# Patient Record
Sex: MALE | Race: WHITE | HISPANIC OR LATINO | Employment: OTHER | URBAN - METROPOLITAN AREA
[De-identification: names, ages, dates, MRNs, and addresses within clinical notes are randomized per-mention and may not be internally consistent; named-entity substitution may affect disease eponyms.]

---

## 2017-08-23 ENCOUNTER — HOSPITAL ENCOUNTER (OUTPATIENT)
Facility: HOSPITAL | Age: 47
Discharge: HOME | End: 2017-08-24
Attending: INTERNAL MEDICINE | Admitting: INTERNAL MEDICINE
Payer: MEDICARE

## 2017-08-23 DIAGNOSIS — R10.32 LLQ ABDOMINAL PAIN: ICD-10-CM

## 2017-08-23 DIAGNOSIS — I10 ESSENTIAL HYPERTENSION: ICD-10-CM

## 2017-08-23 DIAGNOSIS — G47.30 SLEEP APNEA, UNSPECIFIED TYPE: ICD-10-CM

## 2017-08-23 DIAGNOSIS — A09 DIARRHEA OF INFECTIOUS ORIGIN: Primary | ICD-10-CM

## 2017-08-23 LAB
ALBUMIN SERPL BCP-MCNC: 3.5 G/DL
ALP SERPL-CCNC: 148 U/L
ALT SERPL W/O P-5'-P-CCNC: 10 U/L
AMYLASE SERPL-CCNC: 51 U/L
ANION GAP SERPL CALC-SCNC: 11 MMOL/L
APTT BLDCRRT: 29.2 SEC
AST SERPL-CCNC: 13 U/L
BASOPHILS NFR BLD: 0 %
BILIRUB SERPL-MCNC: 0.4 MG/DL
BILIRUB UR QL STRIP: NEGATIVE
BUN SERPL-MCNC: 10 MG/DL
CALCIUM SERPL-MCNC: 9.5 MG/DL
CHLORIDE SERPL-SCNC: 106 MMOL/L
CLARITY UR: CLEAR
CO2 SERPL-SCNC: 23 MMOL/L
COLOR UR: YELLOW
CREAT SERPL-MCNC: 1 MG/DL
DIFFERENTIAL METHOD: ABNORMAL
EOSINOPHIL NFR BLD: 35 %
ERYTHROCYTE [DISTWIDTH] IN BLOOD BY AUTOMATED COUNT: 13.5 %
EST. GFR  (AFRICAN AMERICAN): >60 ML/MIN/1.73 M^2
EST. GFR  (NON AFRICAN AMERICAN): >60 ML/MIN/1.73 M^2
GLUCOSE SERPL-MCNC: 105 MG/DL
GLUCOSE UR QL STRIP: NEGATIVE
HCT VFR BLD AUTO: 40.5 %
HGB BLD-MCNC: 13.5 G/DL
HGB UR QL STRIP: NEGATIVE
INR PPP: 1.1
KETONES UR QL STRIP: NEGATIVE
LEUKOCYTE ESTERASE UR QL STRIP: NEGATIVE
LYMPHOCYTES NFR BLD: 14 %
MCH RBC QN AUTO: 29.2 PG
MCHC RBC AUTO-ENTMCNC: 33.4 G/DL
MCV RBC AUTO: 87 FL
MONOCYTES NFR BLD: 1 %
NEUTROPHILS NFR BLD: 50 %
NITRITE UR QL STRIP: NEGATIVE
PH UR STRIP: 8 [PH] (ref 5–8)
PLATELET # BLD AUTO: 290 K/UL
PMV BLD AUTO: 7.1 FL
POTASSIUM SERPL-SCNC: 3.5 MMOL/L
PROT SERPL-MCNC: 7.4 G/DL
PROT UR QL STRIP: NEGATIVE
PROTHROMBIN TIME: 11.1 SEC
RBC # BLD AUTO: 4.64 M/UL
SODIUM SERPL-SCNC: 140 MMOL/L
SP GR UR STRIP: 1.02 (ref 1–1.03)
URN SPEC COLLECT METH UR: NORMAL
UROBILINOGEN UR STRIP-ACNC: NEGATIVE EU/DL
WBC # BLD AUTO: 13.2 K/UL

## 2017-08-23 PROCEDURE — 25500020 PHARM REV CODE 255: Performed by: INTERNAL MEDICINE

## 2017-08-23 PROCEDURE — G0379 DIRECT REFER HOSPITAL OBSERV: HCPCS

## 2017-08-23 PROCEDURE — 99219 PR INITIAL OBSERVATION CARE,LEVL II: CPT | Mod: ,,, | Performed by: INTERNAL MEDICINE

## 2017-08-23 PROCEDURE — 80053 COMPREHEN METABOLIC PANEL: CPT

## 2017-08-23 PROCEDURE — S0030 INJECTION, METRONIDAZOLE: HCPCS | Performed by: INTERNAL MEDICINE

## 2017-08-23 PROCEDURE — 85007 BL SMEAR W/DIFF WBC COUNT: CPT

## 2017-08-23 PROCEDURE — 81003 URINALYSIS AUTO W/O SCOPE: CPT

## 2017-08-23 PROCEDURE — 85027 COMPLETE CBC AUTOMATED: CPT

## 2017-08-23 PROCEDURE — 82150 ASSAY OF AMYLASE: CPT

## 2017-08-23 PROCEDURE — 25500020 PHARM REV CODE 255

## 2017-08-23 PROCEDURE — 99900035 HC TECH TIME PER 15 MIN (STAT)

## 2017-08-23 PROCEDURE — 85730 THROMBOPLASTIN TIME PARTIAL: CPT

## 2017-08-23 PROCEDURE — 25000003 PHARM REV CODE 250: Performed by: INTERNAL MEDICINE

## 2017-08-23 PROCEDURE — 36415 COLL VENOUS BLD VENIPUNCTURE: CPT

## 2017-08-23 PROCEDURE — 63600175 PHARM REV CODE 636 W HCPCS: Performed by: INTERNAL MEDICINE

## 2017-08-23 PROCEDURE — G0378 HOSPITAL OBSERVATION PER HR: HCPCS

## 2017-08-23 PROCEDURE — 85610 PROTHROMBIN TIME: CPT

## 2017-08-23 PROCEDURE — 94761 N-INVAS EAR/PLS OXIMETRY MLT: CPT

## 2017-08-23 RX ORDER — HYDROCODONE BITARTRATE AND ACETAMINOPHEN 5; 325 MG/1; MG/1
1 TABLET ORAL EVERY 4 HOURS PRN
Status: DISCONTINUED | OUTPATIENT
Start: 2017-08-23 | End: 2017-08-24 | Stop reason: HOSPADM

## 2017-08-23 RX ORDER — LEVOTHYROXINE SODIUM 75 UG/1
75 TABLET ORAL DAILY
COMMUNITY

## 2017-08-23 RX ORDER — FLUTICASONE PROPIONATE 50 MCG
2 SPRAY, SUSPENSION (ML) NASAL DAILY
Status: DISCONTINUED | OUTPATIENT
Start: 2017-08-24 | End: 2017-08-24 | Stop reason: HOSPADM

## 2017-08-23 RX ORDER — POTASSIUM CHLORIDE 20 MEQ/1
20 TABLET, EXTENDED RELEASE ORAL DAILY
Status: DISCONTINUED | OUTPATIENT
Start: 2017-08-24 | End: 2017-08-24 | Stop reason: HOSPADM

## 2017-08-23 RX ORDER — POTASSIUM CHLORIDE 20 MEQ/1
20 TABLET, EXTENDED RELEASE ORAL
COMMUNITY

## 2017-08-23 RX ORDER — OLOPATADINE HYDROCHLORIDE 1 MG/ML
1 SOLUTION/ DROPS OPHTHALMIC DAILY PRN
Status: DISCONTINUED | OUTPATIENT
Start: 2017-08-23 | End: 2017-08-24 | Stop reason: HOSPADM

## 2017-08-23 RX ORDER — ERGOCALCIFEROL 1.25 MG/1
50000 CAPSULE ORAL
Status: DISCONTINUED | OUTPATIENT
Start: 2017-08-24 | End: 2017-08-24 | Stop reason: HOSPADM

## 2017-08-23 RX ORDER — MODAFINIL 100 MG/1
100 TABLET ORAL DAILY
Status: DISCONTINUED | OUTPATIENT
Start: 2017-08-24 | End: 2017-08-24 | Stop reason: HOSPADM

## 2017-08-23 RX ORDER — METHOCARBAMOL 750 MG/1
500 TABLET, FILM COATED ORAL 3 TIMES DAILY PRN
COMMUNITY
End: 2019-07-25

## 2017-08-23 RX ORDER — CYANOCOBALAMIN 1000 UG/ML
1000 INJECTION, SOLUTION INTRAMUSCULAR; SUBCUTANEOUS
Status: DISCONTINUED | OUTPATIENT
Start: 2017-08-23 | End: 2017-08-24 | Stop reason: HOSPADM

## 2017-08-23 RX ORDER — ALISKIREN 150 MG/1
150 TABLET, FILM COATED ORAL DAILY
Status: DISCONTINUED | OUTPATIENT
Start: 2017-08-24 | End: 2017-08-24 | Stop reason: HOSPADM

## 2017-08-23 RX ORDER — PRAVASTATIN SODIUM 10 MG/1
20 TABLET ORAL DAILY
Status: DISCONTINUED | OUTPATIENT
Start: 2017-08-24 | End: 2017-08-24 | Stop reason: HOSPADM

## 2017-08-23 RX ORDER — METHOCARBAMOL 500 MG/1
500 TABLET, FILM COATED ORAL 3 TIMES DAILY PRN
Status: DISCONTINUED | OUTPATIENT
Start: 2017-08-23 | End: 2017-08-24 | Stop reason: HOSPADM

## 2017-08-23 RX ORDER — MONTELUKAST SODIUM 10 MG/1
10 TABLET ORAL DAILY
Status: DISCONTINUED | OUTPATIENT
Start: 2017-08-24 | End: 2017-08-24 | Stop reason: HOSPADM

## 2017-08-23 RX ORDER — ACETAMINOPHEN AND CODEINE PHOSPHATE 300; 30 MG/1; MG/1
1 TABLET ORAL 3 TIMES DAILY PRN
COMMUNITY
End: 2019-07-25

## 2017-08-23 RX ORDER — CARVEDILOL 6.25 MG/1
12.5 TABLET ORAL 2 TIMES DAILY
Status: DISCONTINUED | OUTPATIENT
Start: 2017-08-23 | End: 2017-08-24 | Stop reason: HOSPADM

## 2017-08-23 RX ORDER — PANTOPRAZOLE SODIUM 40 MG/1
40 TABLET, DELAYED RELEASE ORAL DAILY
Status: DISCONTINUED | OUTPATIENT
Start: 2017-08-23 | End: 2017-08-24 | Stop reason: HOSPADM

## 2017-08-23 RX ORDER — SPIRONOLACTONE 25 MG/1
25 TABLET ORAL DAILY
Status: DISCONTINUED | OUTPATIENT
Start: 2017-08-24 | End: 2017-08-24 | Stop reason: HOSPADM

## 2017-08-23 RX ORDER — ENOXAPARIN SODIUM 100 MG/ML
40 INJECTION SUBCUTANEOUS EVERY 24 HOURS
Status: DISCONTINUED | OUTPATIENT
Start: 2017-08-23 | End: 2017-08-24 | Stop reason: HOSPADM

## 2017-08-23 RX ORDER — SODIUM CHLORIDE 450 MG/100ML
INJECTION, SOLUTION INTRAVENOUS CONTINUOUS
Status: DISCONTINUED | OUTPATIENT
Start: 2017-08-23 | End: 2017-08-24 | Stop reason: HOSPADM

## 2017-08-23 RX ORDER — ALBUTEROL SULFATE 90 UG/1
2 AEROSOL, METERED RESPIRATORY (INHALATION) EVERY 6 HOURS PRN
Status: DISCONTINUED | OUTPATIENT
Start: 2017-08-23 | End: 2017-08-23

## 2017-08-23 RX ORDER — OLOPATADINE HYDROCHLORIDE 1 MG/ML
1 SOLUTION/ DROPS OPHTHALMIC DAILY PRN
COMMUNITY
End: 2019-07-25

## 2017-08-23 RX ORDER — DESVENLAFAXINE 50 MG/1
100 TABLET, EXTENDED RELEASE ORAL DAILY
Status: DISCONTINUED | OUTPATIENT
Start: 2017-08-24 | End: 2017-08-24 | Stop reason: HOSPADM

## 2017-08-23 RX ORDER — FLUTICASONE PROPIONATE AND SALMETEROL 500; 50 UG/1; UG/1
1 POWDER RESPIRATORY (INHALATION) 2 TIMES DAILY
Status: DISCONTINUED | OUTPATIENT
Start: 2017-08-23 | End: 2017-08-23

## 2017-08-23 RX ORDER — ACETAMINOPHEN 325 MG/1
650 TABLET ORAL EVERY 6 HOURS PRN
Status: DISCONTINUED | OUTPATIENT
Start: 2017-08-23 | End: 2017-08-24 | Stop reason: HOSPADM

## 2017-08-23 RX ORDER — ERGOCALCIFEROL 1.25 MG/1
50000 CAPSULE ORAL
COMMUNITY

## 2017-08-23 RX ORDER — ALBUTEROL SULFATE 2.5 MG/.5ML
2.5 SOLUTION RESPIRATORY (INHALATION) EVERY 6 HOURS PRN
Status: DISCONTINUED | OUTPATIENT
Start: 2017-08-23 | End: 2017-08-24 | Stop reason: HOSPADM

## 2017-08-23 RX ORDER — FLUTICASONE FUROATE AND VILANTEROL 200; 25 UG/1; UG/1
1 POWDER RESPIRATORY (INHALATION) DAILY
Status: DISCONTINUED | OUTPATIENT
Start: 2017-08-24 | End: 2017-08-24 | Stop reason: HOSPADM

## 2017-08-23 RX ORDER — IRBESARTAN 300 MG/1
75 TABLET ORAL NIGHTLY
COMMUNITY
End: 2021-06-16 | Stop reason: SDUPTHER

## 2017-08-23 RX ORDER — METRONIDAZOLE 500 MG/100ML
500 INJECTION, SOLUTION INTRAVENOUS
Status: DISCONTINUED | OUTPATIENT
Start: 2017-08-23 | End: 2017-08-24 | Stop reason: HOSPADM

## 2017-08-23 RX ORDER — DIVALPROEX SODIUM 250 MG/1
250 TABLET, DELAYED RELEASE ORAL NIGHTLY
Status: DISCONTINUED | OUTPATIENT
Start: 2017-08-23 | End: 2017-08-24 | Stop reason: HOSPADM

## 2017-08-23 RX ORDER — ZOLPIDEM TARTRATE 5 MG/1
5 TABLET ORAL NIGHTLY PRN
Status: DISCONTINUED | OUTPATIENT
Start: 2017-08-23 | End: 2017-08-24 | Stop reason: HOSPADM

## 2017-08-23 RX ORDER — CETIRIZINE HYDROCHLORIDE 10 MG/1
10 TABLET ORAL 2 TIMES DAILY
COMMUNITY

## 2017-08-23 RX ORDER — AZELASTINE HYDROCHLORIDE, FLUTICASONE PROPIONATE 137; 50 UG/1; UG/1
1 SPRAY, METERED NASAL 2 TIMES DAILY
COMMUNITY
End: 2021-06-16 | Stop reason: SDUPTHER

## 2017-08-23 RX ORDER — CYANOCOBALAMIN 1000 UG/ML
1000 INJECTION, SOLUTION INTRAMUSCULAR; SUBCUTANEOUS
COMMUNITY
End: 2019-07-25

## 2017-08-23 RX ADMIN — HYDROCODONE BITARTRATE AND ACETAMINOPHEN 1 TABLET: 5; 325 TABLET ORAL at 11:08

## 2017-08-23 RX ADMIN — CARVEDILOL 12.5 MG: 6.25 TABLET, FILM COATED ORAL at 09:08

## 2017-08-23 RX ADMIN — HYDROCODONE BITARTRATE AND ACETAMINOPHEN 1 TABLET: 5; 325 TABLET ORAL at 06:08

## 2017-08-23 RX ADMIN — PANTOPRAZOLE SODIUM 40 MG: 40 TABLET, DELAYED RELEASE ORAL at 10:08

## 2017-08-23 RX ADMIN — CYANOCOBALAMIN 1000 MCG: 1000 INJECTION, SOLUTION INTRAMUSCULAR at 05:08

## 2017-08-23 RX ADMIN — SODIUM CHLORIDE: 0.45 INJECTION, SOLUTION INTRAVENOUS at 10:08

## 2017-08-23 RX ADMIN — METRONIDAZOLE 500 MG: 500 INJECTION, SOLUTION INTRAVENOUS at 05:08

## 2017-08-23 RX ADMIN — IOHEXOL 30 ML: 350 INJECTION, SOLUTION INTRAVENOUS at 05:08

## 2017-08-23 RX ADMIN — HYDROCODONE BITARTRATE AND ACETAMINOPHEN 1 TABLET: 5; 325 TABLET ORAL at 10:08

## 2017-08-23 RX ADMIN — METRONIDAZOLE 500 MG: 500 INJECTION, SOLUTION INTRAVENOUS at 11:08

## 2017-08-23 RX ADMIN — IOHEXOL 100 ML: 350 INJECTION, SOLUTION INTRAVENOUS at 03:08

## 2017-08-23 RX ADMIN — HYDROCODONE BITARTRATE AND ACETAMINOPHEN 1 TABLET: 5; 325 TABLET ORAL at 03:08

## 2017-08-23 NOTE — PROGRESS NOTES
08/23/17 1203   Patient Assessment/Suction   Level of Consciousness (AVPU) alert   PRE-TX-O2-ETCO2   O2 Device (Oxygen Therapy) room air   SpO2 98 %   Pulse Oximetry Type Intermittent   $ Pulse Oximetry - Multiple Charge Pulse Oximetry - Multiple   Pulse 66   Resp 18

## 2017-08-23 NOTE — H&P
PCP: Provider [facility]    History & Physical    Chief Complaint: Abdominal pain and diarrhea    History of Present Illness:  Patient is a 46 y.o. male admitted to Hospitalist Service from Dr. Young's office with complaint of LLQ abdominal pain and diarrhea. Patient reportedly has past medical history significant for Dilated cardiomyopathy, chronic systolic CHF, hypertension, hyperlipidemia, low testosterone and SONYA and obesity. Patient regularly follows with an allergist and was informed of immuno-deficiency and was tested negative for HIV and hepatitis panel. Patient stated, for the past 2 weeks, he has been having 2-4 loose water bowel movement. No blood, melena or mucus reported in the stool. For the past week LLQ is hurting, no focal tenderness, fever, chills, sick contact or travel history reported. No NSAID or anti-biotics use reported. Patient denied chest pain, shortness of breath, nausea, vomiting, headache, vision changes, focal neuro-deficits, cough or fever.    Past Medical History:   Diagnosis Date    Anxiety     Asthma     Cardiomyopathy     CHF (congestive heart failure)     Hyperlipidemia     Hypertension     Low testosterone     Sleep apnea      Past Surgical History:   Procedure Laterality Date    CARDIAC CATHETERIZATION      left arm surgery      FB removal     Family History   Problem Relation Age of Onset    Cancer Mother      cervical    Heart disease Mother     Hypertension Mother     Cancer Maternal Grandmother      bladder    Macular degeneration Paternal Grandmother     Cancer Paternal Grandfather      skin     Social History   Substance Use Topics    Smoking status: Former Smoker     Quit date: 12/13/1991    Smokeless tobacco: Never Used    Alcohol use Yes      Comment: socially      Review of patient's allergies indicates:   Allergen Reactions    Lyrica [pregabalin]      Memory loss    Micardis [telmisartan] Other (See Comments)     cough    Promethazine Other  (See Comments)    Tetracyclines Other (See Comments)     PTA Medications   Medication Sig    albuterol (VENTOLIN HFA) 90 mcg/Actuation inhaler Three times a day PRN    aliskiren (TEKTURNA) 150 MG tablet Every day    amphetamine-dextroamphetamine (ADDERALL) 30 mg Tab Twice a day    carvedilol (COREG) 25 MG tablet 12.5 mg 2 (two) times daily. Every day    desvenlafaxine (PRISTIQ) 100 MG 24 hr tablet Every day    divalproex (DEPAKOTE) 250 MG EC tablet At bedtime    fluticasone (FLONASE) 50 mcg/Actuation nasal spray Every day    fluticasone-salmeterol (ADVAIR DISKUS) 500-50 mcg/dose DsDv diskus inhaler Every day PRN    lamotrigine (LAMICTAL) 100 MG tablet Every day    modafinil (PROVIGIL) 200 MG tablet Every day    montelukast (SINGULAIR) 10 mg tablet Every day    pravastatin (PRAVACHOL) 80 MG tablet 20 mg. At bedtime    spironolactone (ALDACTONE) 25 MG tablet Every evening    testosterone cypionate (DEPOTESTOTERONE CYPIONATE) 200 mg/mL injection 200 mg every 14 (fourteen) days. every 10 days     Review of Systems:  Constitutional: no fever or chills  Eyes: no visual changes  Ears, nose, mouth, throat, and face: no nasal congestion or sore throat  Respiratory: no cough or shorness of breath  Cardiovascular: no chest pain or palpitations  Gastrointestinal: no nausea or vomiting, + Abdominal pain - LLQ, + Diarrhea  Genitourinary: no hematuria or dysuria  Integument/breast: no rash or pruritis  Hematologic/lymphatic: no easy bruising or lymphadenopathy  Musculoskeletal: no arthralgias or myalgias  Neurological: no seizures or tremors.  Behavioral/Psych: no auditory or visual hallucinations  Endocrine: no heat or cold intolerance     OBJECTIVE:     Vital Signs (Most Recent)  Temp: 97.7 °F (36.5 °C) (08/23/17 0941)  Pulse: 70 (08/23/17 0941)  Resp: 18 (08/23/17 0941)  BP: 135/82 (08/23/17 0941)    Physical Exam:  General appearance: well developed, appears stated age  Head: normocephalic, atraumatic  Eyes:   conjunctivae/corneas clear. PERRL.  Nose: Nares normal. Septum midline.  Throat: lips, mucosa, and tongue normal; teeth and gums normal, no throat erythema.  Neck: supple, symmetrical, trachea midline, no JVD and thyroid not enlarged, symmetric, no tenderness/mass/nodules  Lungs:  clear to auscultation bilaterally and normal respiratory effort  Chest wall: no tenderness  Heart: regular rate and rhythm, S1, S2 normal, no murmur, click, rub or gallop  Abdomen: soft, non-tender non-distented; bowel sounds normal; no masses,  no organomegaly  Extremities: no cyanosis, clubbing or edema.   Pulses: 2+ and symmetric  Skin: Skin color, texture, turgor normal. No rashes or lesions.  Lymph nodes: Cervical, supraclavicular, and axillary nodes normal.  Neurologic: Normal strength and tone. No focal numbness or weakness. CNII-XII intact.      Laboratory:   CBC:   Recent Labs  Lab 08/23/17  1012   WBC 13.20*   RBC 4.64   HGB 13.5*   HCT 40.5      MCV 87   MCH 29.2   MCHC 33.4     CMP:   Recent Labs  Lab 08/23/17  1012      CALCIUM 9.5   ALBUMIN 3.5   PROT 7.4      K 3.5   CO2 23      BUN 10   CREATININE 1.0   ALKPHOS 148*   ALT 10   AST 13   BILITOT 0.4     Coagulation:   Recent Labs  Lab 08/23/17  1012   LABPROT 11.1   INR 1.1   APTT 29.2     Microbiology Results (last 7 days)     Procedure Component Value Units Date/Time    Stool culture [[id number]]     Order Status:  No result Specimen:  Stool from Stool     Clostridium difficile EIA [[id number]]     Order Status:  No result Specimen:  Stool from Stool         Hemoglobin A1C   Date Value Ref Range Status   06/02/2009 5.9 4.0 - 6.2 % Final     Microbiology Results (last 7 days)     ** No results found for the last 168 hours. **        Diagnostic Results: None    Assessment/Plan:     Active Hospital Problems    Diagnosis  POA    *LLQ abdominal pain [R10.32]  Yes    Diarrhea of infectious origin [A09]  Eosinophilia suggests possibility of parasitic  infection.   Obtain CT abdomen and pelvis with and without contrast.  Start IVF hydration and watch for any worsening of CHF.  Start IV Flagyl.  Stool studies for C. Difficile colitis, O+P, Cx and WBC.  Supportive care with PO narcotics.    Yes    Hypertension [I10]  Chronic problem. Will continue chronic medications and monitor for any changes, adjusting as needed.    Yes    Cardiomyopathy  Chronic problem. Will continue chronic medications and monitor for any changes, adjusting as needed.  Tele-monitoring.    Yes    Sleep apnea [G47.30]  Use CPAP as needed.  Yes        VTE Risk Mitigation         Ordered     enoxaparin injection 40 mg  Daily     Route:  Subcutaneous        08/23/17 1004     Place NE hose  Until discontinued      08/23/17 1501     Medium Risk of VTE  Once      08/23/17 1004        Eleni Cox MD  Department of Hospital Medicine   [facility]

## 2017-08-24 VITALS
OXYGEN SATURATION: 98 % | HEIGHT: 70 IN | BODY MASS INDEX: 35.65 KG/M2 | HEART RATE: 81 BPM | WEIGHT: 249 LBS | TEMPERATURE: 98 F | SYSTOLIC BLOOD PRESSURE: 156 MMHG | DIASTOLIC BLOOD PRESSURE: 70 MMHG | RESPIRATION RATE: 18 BRPM

## 2017-08-24 LAB
ALBUMIN SERPL BCP-MCNC: 3.1 G/DL
ALP SERPL-CCNC: 130 U/L
ALT SERPL W/O P-5'-P-CCNC: 9 U/L
ANION GAP SERPL CALC-SCNC: 10 MMOL/L
AST SERPL-CCNC: 12 U/L
BASOPHILS # BLD AUTO: 0 K/UL
BASOPHILS NFR BLD: 0.2 %
BILIRUB SERPL-MCNC: 0.5 MG/DL
BUN SERPL-MCNC: 9 MG/DL
CALCIUM SERPL-MCNC: 8.7 MG/DL
CHLORIDE SERPL-SCNC: 106 MMOL/L
CO2 SERPL-SCNC: 25 MMOL/L
CREAT SERPL-MCNC: 0.9 MG/DL
DIFFERENTIAL METHOD: ABNORMAL
EOSINOPHIL # BLD AUTO: 4.3 K/UL
EOSINOPHIL NFR BLD: 36.4 %
ERYTHROCYTE [DISTWIDTH] IN BLOOD BY AUTOMATED COUNT: 13.5 %
EST. GFR  (AFRICAN AMERICAN): >60 ML/MIN/1.73 M^2
EST. GFR  (NON AFRICAN AMERICAN): >60 ML/MIN/1.73 M^2
GLUCOSE SERPL-MCNC: 88 MG/DL
HCT VFR BLD AUTO: 36.1 %
HGB BLD-MCNC: 12.1 G/DL
LIPASE SERPL-CCNC: 16 U/L
LYMPHOCYTES # BLD AUTO: 1.9 K/UL
LYMPHOCYTES NFR BLD: 16 %
MCH RBC QN AUTO: 29.6 PG
MCHC RBC AUTO-ENTMCNC: 33.6 G/DL
MCV RBC AUTO: 88 FL
MONOCYTES # BLD AUTO: 0.6 K/UL
MONOCYTES NFR BLD: 5.1 %
NEUTROPHILS # BLD AUTO: 5.1 K/UL
NEUTROPHILS NFR BLD: 42.3 %
PLATELET # BLD AUTO: 243 K/UL
PMV BLD AUTO: 7.2 FL
POTASSIUM SERPL-SCNC: 3.4 MMOL/L
PROT SERPL-MCNC: 6.4 G/DL
RBC # BLD AUTO: 4.09 M/UL
SODIUM SERPL-SCNC: 141 MMOL/L
WBC # BLD AUTO: 11.9 K/UL

## 2017-08-24 PROCEDURE — S0030 INJECTION, METRONIDAZOLE: HCPCS | Performed by: INTERNAL MEDICINE

## 2017-08-24 PROCEDURE — 83690 ASSAY OF LIPASE: CPT

## 2017-08-24 PROCEDURE — 25000242 PHARM REV CODE 250 ALT 637 W/ HCPCS: Performed by: INTERNAL MEDICINE

## 2017-08-24 PROCEDURE — 25000003 PHARM REV CODE 250: Performed by: INTERNAL MEDICINE

## 2017-08-24 PROCEDURE — 99900035 HC TECH TIME PER 15 MIN (STAT)

## 2017-08-24 PROCEDURE — 99217 PR OBSERVATION CARE DISCHARGE: CPT | Mod: ,,, | Performed by: INTERNAL MEDICINE

## 2017-08-24 PROCEDURE — G0378 HOSPITAL OBSERVATION PER HR: HCPCS

## 2017-08-24 PROCEDURE — 94761 N-INVAS EAR/PLS OXIMETRY MLT: CPT

## 2017-08-24 PROCEDURE — 36415 COLL VENOUS BLD VENIPUNCTURE: CPT

## 2017-08-24 PROCEDURE — 85025 COMPLETE CBC W/AUTO DIFF WBC: CPT

## 2017-08-24 PROCEDURE — 80053 COMPREHEN METABOLIC PANEL: CPT

## 2017-08-24 RX ORDER — METRONIDAZOLE 500 MG/1
500 TABLET ORAL 3 TIMES DAILY
Qty: 21 TABLET | Refills: 0 | Status: SHIPPED | OUTPATIENT
Start: 2017-08-24 | End: 2017-08-31

## 2017-08-24 RX ORDER — DESVENLAFAXINE SUCCINATE 50 MG/1
100 TABLET, EXTENDED RELEASE ORAL DAILY
Status: DISCONTINUED | OUTPATIENT
Start: 2017-08-24 | End: 2017-08-24 | Stop reason: HOSPADM

## 2017-08-24 RX ADMIN — CARVEDILOL 12.5 MG: 6.25 TABLET, FILM COATED ORAL at 11:08

## 2017-08-24 RX ADMIN — OLOPATADINE HYDROCHLORIDE 1 DROP: 1 SOLUTION/ DROPS OPHTHALMIC at 11:08

## 2017-08-24 RX ADMIN — METRONIDAZOLE 500 MG: 500 INJECTION, SOLUTION INTRAVENOUS at 11:08

## 2017-08-24 RX ADMIN — HYDROCODONE BITARTRATE AND ACETAMINOPHEN 1 TABLET: 5; 325 TABLET ORAL at 06:08

## 2017-08-24 RX ADMIN — ERGOCALCIFEROL 50000 UNITS: 1.25 CAPSULE ORAL at 11:08

## 2017-08-24 RX ADMIN — SODIUM CHLORIDE: 0.45 INJECTION, SOLUTION INTRAVENOUS at 04:08

## 2017-08-24 RX ADMIN — PRAVASTATIN SODIUM 20 MG: 10 TABLET ORAL at 11:08

## 2017-08-24 RX ADMIN — FLUTICASONE PROPIONATE 2 SPRAY: 50 SPRAY, METERED NASAL at 11:08

## 2017-08-24 RX ADMIN — PANTOPRAZOLE SODIUM 40 MG: 40 TABLET, DELAYED RELEASE ORAL at 11:08

## 2017-08-24 RX ADMIN — POTASSIUM CHLORIDE 20 MEQ: 1500 TABLET, EXTENDED RELEASE ORAL at 11:08

## 2017-08-24 RX ADMIN — HYDROCODONE BITARTRATE AND ACETAMINOPHEN 1 TABLET: 5; 325 TABLET ORAL at 11:08

## 2017-08-24 NOTE — NURSING
Discharge instructions reviewed with pt and pt mother. Both pt and pt mother verbalized understanding regarding discharge instructions. Pt discharged in stable condition at this time.

## 2017-08-24 NOTE — UM SECONDARY REVIEW
Physician Advisor External    Level of Care Issue    OP for Admit review 8/23/17 per Dr Mike with EHR

## 2017-08-24 NOTE — DISCHARGE SUMMARY
Discharge Summary  Hospital Medicine    Admit Date: 8/23/2017    Date and Time: 8/24/20171:10 PM    Discharge Attending Physician: Eleni Cox MD    Primary Care Physician: Daniel Young MD    Diagnoses:  Active Hospital Problems    Diagnosis  POA    *LLQ abdominal pain [R10.32]  Yes    Diarrhea of infectious origin [A09]  Yes    Hypertension [I10]  Yes    Cardiomyopathy  Yes    Sleep apnea [G47.30]  Yes      Resolved Hospital Problems    Diagnosis Date Resolved POA   No resolved problems to display.     Discharged Condition: Good    Hospital Course:   Patient is a 46 y.o. male admitted to Hospitalist Service from Dr. Young's office with complaint of LLQ abdominal pain and diarrhea. Patient reportedly has past medical history significant for Dilated cardiomyopathy, chronic systolic CHF, hypertension, hyperlipidemia, low testosterone and SONYA and obesity. Patient regularly follows with an allergist and was informed of immuno-deficiency and was tested negative for HIV and hepatitis panel. Patient stated, for the past 2 weeks, he had been having 2-4 loose water bowel movement. No blood, melena or mucus reported in the stool. For the past week LLQ was hurting, no focal tenderness, fever, chills, sick contact or travel history reported. No NSAID or anti-biotics use reported. Patient denied chest pain, shortness of breath, nausea, vomiting, headache, vision changes, focal neuro-deficits, cough or fever. Patient was admitted to Hospitalist medicine service. Patient was treated with IV antibiotic.CT abdomen failed to show acute process. During hospital stay, patient did not have any bowel movement. Symptoms improved. Patient was discharged home in stable condition with following discharge plan of care.     Consults: None    Significant Diagnostic Studies:   CT abdomen:  No acute findings.  Findings as detailed above including very mildly prominent right lower quadrant mesenteric lymph node, prominent amount of  stool in colon, mild atrophy/fatty replacement of the pancreas    Microbiology Results (last 7 days)     Procedure Component Value Units Date/Time    Stool culture [[id number]]     Order Status:  No result Specimen:  Stool from Stool     Clostridium difficile EIA [[id number]]     Order Status:  No result Specimen:  Stool from Stool         Special Treatments/Procedures: None  Disposition: Home or Self Care    Medications:  Reconciled Home Medications: Current Discharge Medication List      START taking these medications    Details   metronidazole (FLAGYL) 500 MG tablet Take 1 tablet (500 mg total) by mouth 3 (three) times daily.  Qty: 21 tablet, Refills: 0         CONTINUE these medications which have NOT CHANGED    Details   acetaminophen-codeine 300-30mg (TYLENOL #3) 300-30 mg Tab Take 1 tablet by mouth 3 (three) times daily as needed (cough).      azelastine-fluticasone (DYMISTA) 137-50 mcg/spray Spry nassal spray 1 spray by Each Nare route 2 (two) times daily.      cetirizine (ZYRTEC) 10 MG tablet Take 10 mg by mouth 2 (two) times daily.      cyanocobalamin 1,000 mcg/mL injection 1,000 mcg every 28 days.      ergocalciferol (ERGOCALCIFEROL) 50,000 unit Cap Take 50,000 Units by mouth every 7 days.      irbesartan (AVAPRO) 300 MG tablet Take 300 mg by mouth every evening.      levothyroxine (SYNTHROID) 75 MCG tablet Take 75 mcg by mouth once daily.      methocarbamol (ROBAXIN) 750 MG Tab Take 500 mg by mouth 3 (three) times daily as needed.      olopatadine (PATANOL) 0.1 % ophthalmic solution Place 1 drop into both eyes daily as needed for Allergies.      potassium chloride SA (K-DUR,KLOR-CON) 20 MEQ tablet Take 20 mEq by mouth once daily.      ranitidine (ZANTAC) 150 MG capsule Take 150 mg by mouth 2 (two) times daily.      albuterol (VENTOLIN HFA) 90 mcg/Actuation inhaler Three times a day PRN      aliskiren (TEKTURNA) 150 MG tablet Every day      carvedilol (COREG) 25 MG tablet 12.5 mg 2 (two) times daily. Every  day      desvenlafaxine (PRISTIQ) 100 MG 24 hr tablet Every day      divalproex (DEPAKOTE) 250 MG EC tablet At bedtime      fluticasone (FLONASE) 50 mcg/Actuation nasal spray Every day      fluticasone-salmeterol (ADVAIR DISKUS) 500-50 mcg/dose DsDv diskus inhaler Every day PRN      modafinil (PROVIGIL) 200 MG tablet Every day      montelukast (SINGULAIR) 10 mg tablet Every day      pravastatin (PRAVACHOL) 80 MG tablet 20 mg. At bedtime      spironolactone (ALDACTONE) 25 MG tablet Every evening      testosterone cypionate (DEPOTESTOTERONE CYPIONATE) 200 mg/mL injection 200 mg every 14 (fourteen) days. every 10 days         STOP taking these medications       amphetamine-dextroamphetamine (ADDERALL) 30 mg Tab Comments:   Reason for Stopping:         lamotrigine (LAMICTAL) 100 MG tablet Comments:   Reason for Stopping:               Discharge Procedure Orders  Diet general   Order Comments: Cardiac/ 2 gram sodium low cholesterol diet     Other restrictions (specify):   Order Comments: Fall precautions     Call MD for:   Order Comments: For worsening symptoms, chest pain, shortness of breath, increased abdominal pain, high grade fever, stroke or stroke like symptoms, immediately go to the nearest Emergency Room or call 911 as soon as possible.       Follow-up Information     Daniel Young MD On 9/8/2017.    Specialty:  Internal Medicine  Why:  @11:45am   Contact information:  [address] [address]  [phone]

## 2017-08-24 NOTE — PROGRESS NOTES
08/24/17 0920   Patient Assessment/Suction   Level of Consciousness (AVPU) alert   All Lung Fields Breath Sounds clear   PRE-TX-O2-ETCO2   O2 Device (Oxygen Therapy) room air   SpO2 97 %   Pulse Oximetry Type Intermittent   $ Pulse Oximetry - Multiple Charge Pulse Oximetry - Multiple   Pulse 71   Resp 18   Aerosol Therapy   $ Aerosol Therapy Charges PRN treatment not required   Respiratory Treatment Status PRN treatment not required   Inhaler   $ Inhaler Charges Refused   Respiratory Treatment Status refused   Alb Q6PRN, Breo QD, no tx required this am, pt also states that he does not want any respiratory treatments, he does not use these medications and states that he does not have any lung related problem.

## 2017-08-24 NOTE — PLAN OF CARE
The pt lives at home with his mother, Mónica who was at bedside. He denies HH but has a CPAP machine. He uses [facility] pharmacy in [place]. He arrived from Dr. Young's office and has no previous admits. Verified his insurance as Medicare and Medicaid . I introduced him to the blue discharge folder and wrote my name and number on the pts white board. [place] GUERRIER Toño, JD [facility]     08/24/17 7752   Discharge Assessment   Assessment Type Discharge Planning Assessment   Confirmed/corrected address and phone number on facesheet? Yes   Assessment information obtained from? Patient   Communicated expected length of stay with patient/caregiver no   Prior to hospitilization cognitive status: Alert/Oriented   Prior to hospitalization functional status: Independent   Current cognitive status: Alert/Oriented   Current Functional Status: Independent   Lives With parent(s)   Able to Return to Prior Arrangements yes   Is patient able to care for self after discharge? Yes   Readmission Within The Last 30 Days no previous admission in last 30 days   Patient currently being followed by outpatient case management? No   Patient currently receives any other outside agency services? No   Equipment Currently Used at Home CPAP   Do you have any problems affording any of your prescribed medications? No  ([facility] [address] )   Is the patient taking medications as prescribed? yes   Does the patient have transportation home? Yes   Transportation Available family or friend will provide   Does the patient receive services at the [facility] Clinic? No   Discharge Plan A Home   Discharge Plan B Home with family   Patient/Family In Agreement With Plan yes

## 2017-08-24 NOTE — PLAN OF CARE
Problem: Patient Care Overview  Goal: Plan of Care Review  Ensured patient safety with frequent checks, assessing pain and patient stated no bm in the hospital as of yet, CT of abdomen completed, patient refused NE and lovenox, walking around, will continue to monitor.

## 2017-08-24 NOTE — PROGRESS NOTES
Patient refused NE and lovenox despite the education on the same. Patient is afraid he will develop a reaction to the medication and reiterated the importance of the NE hose, patient refused, attempted to contact Dr. Cox, no answer, relayed the same to the oncoming shift, patient up ambulating independently.

## 2017-08-24 NOTE — PLAN OF CARE
Problem: Patient Care Overview  Goal: Plan of Care Review  Outcome: Revised  Pt is AAOx4.  Pt c/o pain, prn medication given, pt tolerated well.  Pt denied nausea.  IV fluids infusion, no redness or swelling noted.  Pt refused TEDs, ambulation promoted, will notify day shift.  Pt remains free from injury.  Bed in low position, wheels locked, call light within reach.  Pt verbalized understanding of POC.  Will continue to monitor.

## 2017-08-25 NOTE — PLAN OF CARE
08/25/17 1600   Final Note   Assessment Type Final Discharge Note   Discharge Disposition Home   Hospital Follow Up  Appt(s) scheduled? Yes   Discharge plans and expectations educations in teach back method with documentation complete? Yes

## 2018-01-29 ENCOUNTER — HOSPITAL ENCOUNTER (OUTPATIENT)
Dept: RADIOLOGY | Facility: HOSPITAL | Age: 48
Discharge: HOME | End: 2018-01-29
Attending: INTERNAL MEDICINE
Payer: MEDICARE

## 2018-01-29 DIAGNOSIS — J30.9 ALLERGIC RHINITIS: ICD-10-CM

## 2018-01-29 DIAGNOSIS — J30.9 ALLERGIC RHINITIS: Primary | ICD-10-CM

## 2018-01-29 PROCEDURE — 71046 X-RAY EXAM CHEST 2 VIEWS: CPT | Mod: 26,,, | Performed by: RADIOLOGY

## 2018-01-29 PROCEDURE — 71046 X-RAY EXAM CHEST 2 VIEWS: CPT | Mod: TC,FY

## 2018-01-29 PROCEDURE — 70220 X-RAY EXAM OF SINUSES: CPT | Mod: TC,FY

## 2018-01-29 PROCEDURE — 70220 X-RAY EXAM OF SINUSES: CPT | Mod: 26,,, | Performed by: RADIOLOGY

## 2018-11-26 ENCOUNTER — HOSPITAL ENCOUNTER (OUTPATIENT)
Dept: RADIOLOGY | Facility: HOSPITAL | Age: 48
Discharge: HOME | End: 2018-11-26
Attending: INTERNAL MEDICINE
Payer: MEDICARE

## 2018-11-26 DIAGNOSIS — J06.9 ACUTE UPPER RESPIRATORY INFECTION, UNSPECIFIED: ICD-10-CM

## 2018-11-26 DIAGNOSIS — J06.9 ACUTE UPPER RESPIRATORY INFECTION, UNSPECIFIED: Primary | ICD-10-CM

## 2018-11-26 PROCEDURE — 70220 X-RAY EXAM OF SINUSES: CPT | Mod: TC,FY

## 2018-11-26 PROCEDURE — 71046 X-RAY EXAM CHEST 2 VIEWS: CPT | Mod: TC,FY

## 2018-11-26 PROCEDURE — 70220 X-RAY EXAM OF SINUSES: CPT | Mod: 26,,, | Performed by: RADIOLOGY

## 2018-11-26 PROCEDURE — 71046 X-RAY EXAM CHEST 2 VIEWS: CPT | Mod: 26,,, | Performed by: RADIOLOGY

## 2019-04-29 ENCOUNTER — HOSPITAL ENCOUNTER (OUTPATIENT)
Dept: RADIOLOGY | Facility: HOSPITAL | Age: 49
Discharge: HOME | End: 2019-04-29
Attending: INTERNAL MEDICINE
Payer: MEDICARE

## 2019-04-29 DIAGNOSIS — M54.50 LOW BACK PAIN: Primary | ICD-10-CM

## 2019-04-29 DIAGNOSIS — M54.50 LOW BACK PAIN: ICD-10-CM

## 2019-04-29 PROCEDURE — 72100 X-RAY EXAM L-S SPINE 2/3 VWS: CPT | Mod: TC,FY

## 2019-04-29 PROCEDURE — 72100 X-RAY EXAM L-S SPINE 2/3 VWS: CPT | Mod: 26,,, | Performed by: RADIOLOGY

## 2019-04-29 PROCEDURE — 72100 XR LUMBAR SPINE 2 OR 3 VIEWS: ICD-10-PCS | Mod: 26,,, | Performed by: RADIOLOGY

## 2019-07-25 ENCOUNTER — OFFICE VISIT (OUTPATIENT)
Dept: PODIATRY | Facility: CLINIC | Age: 49
End: 2019-07-25
Payer: MEDICARE

## 2019-07-25 ENCOUNTER — TELEPHONE (OUTPATIENT)
Dept: PODIATRY | Facility: CLINIC | Age: 49
End: 2019-07-25

## 2019-07-25 VITALS
WEIGHT: 249 LBS | BODY MASS INDEX: 35.65 KG/M2 | DIASTOLIC BLOOD PRESSURE: 83 MMHG | HEART RATE: 78 BPM | SYSTOLIC BLOOD PRESSURE: 123 MMHG | HEIGHT: 70 IN

## 2019-07-25 DIAGNOSIS — L60.0 INGROWN TOENAIL OF LEFT FOOT WITH INFECTION: Primary | ICD-10-CM

## 2019-07-25 DIAGNOSIS — M79.675 GREAT TOE PAIN, LEFT: ICD-10-CM

## 2019-07-25 PROCEDURE — 99203 PR OFFICE/OUTPT VISIT, NEW, LEVL III, 30-44 MIN: ICD-10-PCS | Mod: 25,,, | Performed by: PODIATRIST

## 2019-07-25 PROCEDURE — 99203 OFFICE O/P NEW LOW 30 MIN: CPT | Mod: 25,,, | Performed by: PODIATRIST

## 2019-07-25 PROCEDURE — 11750 NAIL REMOVAL: ICD-10-PCS | Mod: TA,,, | Performed by: PODIATRIST

## 2019-07-25 PROCEDURE — 11750 EXCISION NAIL&NAIL MATRIX: CPT | Mod: TA,,, | Performed by: PODIATRIST

## 2019-07-25 RX ORDER — CARVEDILOL 12.5 MG/1
12.5 TABLET ORAL 2 TIMES DAILY WITH MEALS
COMMUNITY
End: 2022-05-17

## 2019-07-25 RX ORDER — HYDROCODONE BITARTRATE AND ACETAMINOPHEN 5; 325 MG/1; MG/1
1 TABLET ORAL EVERY 6 HOURS PRN
Qty: 28 TABLET | Refills: 0 | Status: SHIPPED | OUTPATIENT
Start: 2019-07-25 | End: 2021-01-06

## 2019-07-25 RX ORDER — ORPHENADRINE CITRATE 100 MG/1
TABLET, EXTENDED RELEASE ORAL
Refills: 1 | COMMUNITY
Start: 2019-04-30 | End: 2022-05-17

## 2019-07-25 RX ORDER — PREDNISONE 20 MG/1
TABLET ORAL
COMMUNITY
Start: 2019-05-06 | End: 2021-12-10

## 2019-07-25 RX ORDER — EZETIMIBE 10 MG/1
10 TABLET ORAL DAILY
Refills: 5 | COMMUNITY
Start: 2019-05-28

## 2019-07-25 RX ORDER — LISDEXAMFETAMINE DIMESYLATE 50 MG/1
CAPSULE ORAL
Refills: 0 | COMMUNITY
Start: 2019-07-01 | End: 2021-02-23 | Stop reason: SDUPTHER

## 2019-07-25 RX ORDER — MELOXICAM 15 MG/1
15 TABLET ORAL DAILY
Refills: 3 | COMMUNITY
Start: 2019-05-25 | End: 2021-02-23 | Stop reason: SDUPTHER

## 2019-07-25 RX ORDER — TRAMADOL HYDROCHLORIDE 50 MG/1
50 TABLET ORAL EVERY 8 HOURS PRN
Refills: 0 | COMMUNITY
Start: 2019-07-23 | End: 2022-05-17

## 2019-07-25 RX ORDER — PANTOPRAZOLE SODIUM 40 MG/1
TABLET, DELAYED RELEASE ORAL
COMMUNITY
Start: 2019-05-06 | End: 2021-03-03 | Stop reason: SDUPTHER

## 2019-07-25 RX ORDER — MUPIROCIN 20 MG/G
OINTMENT TOPICAL
Refills: 1 | COMMUNITY
Start: 2019-07-23 | End: 2021-06-16

## 2019-07-25 RX ORDER — CEPHALEXIN 500 MG/1
500 CAPSULE ORAL EVERY 12 HOURS
Qty: 20 CAPSULE | Refills: 0 | Status: SHIPPED | OUTPATIENT
Start: 2019-07-25 | End: 2019-08-04

## 2019-07-25 RX ORDER — CEFUROXIME AXETIL 500 MG/1
500 TABLET ORAL 2 TIMES DAILY
Refills: 0 | COMMUNITY
Start: 2019-07-23 | End: 2021-01-06

## 2019-07-25 NOTE — PROGRESS NOTES
[address] [facility] [address]  [place] [address]  Phone: [phone]   [phone]    Patient's PCP:Daniel Young MD  Referring Provider: No ref. provider found    Subjective:      Chief Complaint:: Toe Pain (left great toe)    HPI  Bharath Parr is a 48 y.o. male who presents with a complaint of left toe pain lasting for about 1 year. Onset of the symptoms was pt dropped a brick on top of left great toe.  Current symptoms include red, oozing, tender/sensitive to touch.  Treatment to date have included daily dressings. Patients rates pain 8/10 on pain scale.    Vitals:    07/25/19 0930   BP: 123/83   Pulse: 78     Shoe Size: 10 / 10.5    Past Surgical History:   Procedure Laterality Date    CARDIAC CATHETERIZATION      left arm surgery      FB removal     Past Medical History:   Diagnosis Date    Anxiety     Asthma     Cardiomyopathy     CHF (congestive heart failure)     Hyperlipidemia     Hypertension     Low testosterone     Sleep apnea      Family History   Problem Relation Age of Onset    Cancer Mother         cervical    Heart disease Mother     Hypertension Mother     Cancer Maternal Grandmother         bladder    Macular degeneration Paternal Grandmother     Cancer Paternal Grandfather         skin        Social History:   Marital Status: Single  Alcohol History:  reports that he drinks alcohol.  Tobacco History:  reports that he quit smoking about 27 years ago. He has never used smokeless tobacco.  Drug History:  reports that he does not use drugs.    Review of patient's allergies indicates:   Allergen Reactions    Doxycycline      Makes my throat swell    Lyrica [pregabalin]      Memory loss    Micardis [telmisartan] Other (See Comments)     cough    Promethazine Other (See Comments)     Makes my skin crawl      Tetracyclines Other (See Comments) and Swelling       Current Outpatient Medications   Medication Sig Dispense Refill    azelastine-fluticasone (DYMISTA)  ED Provider Note    Scribed for Nissa Rodríguez D.O. by Pipo Carter. 4/25/2017, 8:13 PM.    Primary care provider: Pcp Pt States None  Means of arrival: Walk In  History obtained from: Patient  History limited by: None    CHIEF COMPLAINT  Chief Complaint   Patient presents with   • Headache     intermittent HA and eye pain x1 week.   • Nausea/Vomiting/Diarrhea     x1 week, unable to keep food down for 1 week.     • Vaginal Bleeding     fould smell, white discharge, blood x 1 month.     HPI  Shauna Vail is a 23 y.o. female who presents to the Emergency Department complaining of headache. The patient has had constant headache for the last week. Her headache is located behind her eyes, and she rates her headache as 7/10 in severity. She states that she has had similar headaches in the past and this is not new or different. Patient complains of worsening pain behind her eyes with lateral and vertical eye movement. Patient has medicated her headache with Tylenol, which did not give the patient any relief. The patient reports some chills and nasal congestion but no fevers. Patient reports she has had diarrhea for the last 1 week, with about 5 episodes of diarrhea per day. Patient complains of dysuria over the last week as well. The patient had a left arm birth control implant one month ago, and reports she has had small amount of constant vaginal bleeding since the implant.  She denies any pelvic pain. Patient denies any syncope, dysuria, fever, abdominal pain, neck pain, back pain.     REVIEW OF SYSTEMS  See HPI for further details. All other systems are negative.     PAST MEDICAL HISTORY   has a past medical history of Anxiety.    SURGICAL HISTORY  patient denies any surgical history    SOCIAL HISTORY  Social History   Substance Use Topics   • Smoking status: Former Smoker   • Smokeless tobacco: Never Used   • Alcohol Use: No      History   Drug Use No     FAMILY HISTORY  Family History   Problem Relation Age of Onset   •  "Other Sister      Hep B      CURRENT MEDICATIONS  Reviewed.  See Encounter Summary.     ALLERGIES  No Known Allergies    PHYSICAL EXAM  VITAL SIGNS: /56 mmHg  Pulse 83  Temp(Src) 36.9 °C (98.4 °F)  Resp 16  Ht 1.727 m (5' 7.99\")  Wt 88.4 kg (194 lb 14.2 oz)  BMI 29.64 kg/m2  SpO2 97%  Constitutional: Alert and in no apparent distress.  HENT: Normocephalic atraumatic. Bilateral external ears normal. Nose normal. Mucous membranes are moist.  Eyes: Pupils are equal and reactive. Conjunctiva normal. Non-icteric sclera.   Neck: Normal range of motion without tenderness. Supple. No meningeal signs.  Cardiovascular: Regular rate and rhythm. No murmurs, gallops or rubs.  Thorax & Lungs: Breath sounds are clear to auscultation bilaterally. No wheezing, rhonchi or rales.  Abdomen: Soft, nontender and nondistended. No peritoneal signs noted.  Skin: Warm and dry. No rashes are noted.  Extremities: 2+ peripheral pulses. Cap refill is less than 2 seconds. No edema, cyanosis, or clubbing.  Musculoskeletal: Good range of motion in all major joints. No tenderness to palpation or major deformities noted.   Neurologic: Alert and oriented ×3. The patient moves all 4 extremities and follows commands. CN II-XII grossly intact, sensation grossly intact, 5/5 muscle strength bilateral upper and lower extremities. Normal finger to nose. No pronator drift.  Psychiatric: Affect is normal. Judgment appears to be intact.    DIAGNOSTIC STUDIES / PROCEDURES     LABS  Results for orders placed or performed during the hospital encounter of 04/25/17   URINALYSIS CULTURE, IF INDICATED   Result Value Ref Range    Micro Urine Req Microscopic     Color Yellow     Character Clear     Specific Gravity 1.026 <1.035    Ph 6.5 5.0-8.0    Glucose Negative Negative mg/dL    Ketones Negative Negative mg/dL    Protein Negative Negative mg/dL    Bilirubin Negative Negative    Nitrite Negative Negative    Leukocyte Esterase Negative Negative    Occult " 137-50 mcg/spray Spry nassal spray 1 spray by Each Nare route 2 (two) times daily.      carvedilol (COREG) 12.5 MG tablet Take 12.5 mg by mouth 2 (two) times daily with meals.      cefUROXime (CEFTIN) 500 MG tablet Take 500 mg by mouth 2 (two) times daily.  0    cetirizine (ZYRTEC) 10 MG tablet Take 10 mg by mouth 2 (two) times daily.      ergocalciferol (ERGOCALCIFEROL) 50,000 unit Cap Take 50,000 Units by mouth every 7 days.      ezetimibe (ZETIA) 10 mg tablet Take 10 mg by mouth once daily.  5    irbesartan (AVAPRO) 300 MG tablet Take 300 mg by mouth every evening.      levothyroxine (SYNTHROID) 75 MCG tablet Take 75 mcg by mouth once daily.      meloxicam (MOBIC) 15 MG tablet Take 15 mg by mouth once daily.  3    mupirocin (BACTROBAN) 2 % ointment APPLY A SMALL AMOUNT TO THE AFFECTED AREA TWICE A DAY  1    orphenadrine (NORFLEX) 100 mg tablet   1    pantoprazole (PROTONIX) 40 MG tablet       potassium chloride SA (K-DUR,KLOR-CON) 20 MEQ tablet Take 20 mEq by mouth once daily.      predniSONE (DELTASONE) 20 MG tablet       ranitidine (ZANTAC) 150 MG capsule Take 150 mg by mouth 2 (two) times daily.      testosterone cypionate (DEPOTESTOTERONE CYPIONATE) 200 mg/mL injection 200 mg every 14 (fourteen) days. every 10 days      traMADol (ULTRAM) 50 mg tablet Take 50 mg by mouth every 8 (eight) hours as needed.  0    VYVANSE 50 mg capsule TAKE 1 CAPSULE BY MOUTH ONCE DAILY FOR 30 DAYS  0    albuterol (VENTOLIN HFA) 90 mcg/Actuation inhaler Three times a day PRN      cephALEXin (KEFLEX) 500 MG capsule Take 1 capsule (500 mg total) by mouth every 12 (twelve) hours. for 10 days 20 capsule 0    HYDROcodone-acetaminophen (NORCO) 5-325 mg per tablet Take 1 tablet by mouth every 6 (six) hours as needed for Pain. 28 tablet 0     No current facility-administered medications for this visit.        Review of Systems      Objective:        Physical Exam:   Foot Exam    General  General Appearance: appears  Blood Moderate (A) Negative    Culture Indicated No UA Culture   URINE MICROSCOPIC (W/UA)   Result Value Ref Range    WBC 2-5 /hpf    RBC 0-2 /hpf    Epithelial Cells Few /hpf   POC URINE PREGNANCY   Result Value Ref Range    POC Urine Pregnancy Test Negative Negative       All labs were reviewed by me.    Decision Making:  This is a 23 y.o. year old female who presents with a headache. On initial evaluation, the patient appeared well and in no acute distress. Her vital signs are stable. Her physical exam was unremarkable and she is grossly neurologically intact.     The patient presents with headache without signs of CNS bleed, stroke, infection, or other serious etiology. The patient is neurologically intact. Given the extremely low risk of these diagnoses further testing and evaluation for these possibilities does not appear to be indicated at this time. She was treated with Toradol, Benadryl, and Zofran, and her symptoms improved. She tolerated a by mouth challenge without a shoe here in the emergency department.    Additionally, the patient did complain of vaginal bleeding. She stated that this is been going on for about a month since she had an Implanon placed. She denies any pelvic or abdominal pain. She states it is just a small amount of blood and she has not felt lightheaded at all. I have low clinical suspicion for significant bleeding at this time and I do believe that this may be secondary to a side effect from her Implanon. She was instructed to follow-up with the placed the Implanon.    She did complain of some dysuria and a urinalysis was performed. It was leukocyte esterase and nitrate negative. Low clinical suspicion for UTI at this time and will not treat with antibiotics.    Patient was agreeable with discharge at this time will follow-up. She'll return to ED with any worsening signs or symptoms including passing out, worsening headache, changes in vision, or dehydration secondary to not being  stated age and healthy   Orientation: alert and oriented to person, place, and time   Gait: unimpaired       Left Foot/Ankle      Neurovascular  Dorsalis pedis: 2+  Posterior tibial: 2+  Saphenous nerve sensation: normal  Tibial nerve sensation: normal  Superficial peroneal nerve sensation: normal  Deep peroneal nerve sensation: normal  Sural nerve sensation: normal          Physical Exam   Cardiovascular:   Pulses:       Dorsalis pedis pulses are 2+ on the left side.        Posterior tibial pulses are 2+ on the left side.   Musculoskeletal:        Feet:        Imaging:            Assessment:       1. Ingrown toenail of left foot with infection    2. Great toe pain, left      Plan:   Ingrown toenail of left foot with infection  -     Nail Removal  -     cephALEXin (KEFLEX) 500 MG capsule; Take 1 capsule (500 mg total) by mouth every 12 (twelve) hours. for 10 days  Dispense: 20 capsule; Refill: 0    Great toe pain, left  -     cephALEXin (KEFLEX) 500 MG capsule; Take 1 capsule (500 mg total) by mouth every 12 (twelve) hours. for 10 days  Dispense: 20 capsule; Refill: 0    Other orders  -     HYDROcodone-acetaminophen (NORCO) 5-325 mg per tablet; Take 1 tablet by mouth every 6 (six) hours as needed for Pain.  Dispense: 28 tablet; Refill: 0      Follow up in about 2 weeks (around 8/8/2019) for f/u pa left first toe.    Nail Removal  Date/Time: 7/25/2019 9:39 AM  Performed by: Oscar Beyer DPM  Authorized by: Oscar Beyer DPM     Consent Done?:  Yes (Written)    Location:  Left foot  Anesthesia:  Digital block  Local anesthetic: lidocaine 2% without epinephrine  Anesthetic total (ml):  5  Preparation:  Skin prepped with alcohol    Amount removed:  1/5  Nail removed location: Medial border.  Wedge excision of skin of nail fold: No    Nail bed sutured?: No    Nail matrix removed:  Partial  Removed nail replaced and anchored: No    Dressing applied:  4x4 and antibiotic ointment  Patient tolerance:  Patient  able to tolerate anything by mouth.      FINAL IMPRESSION  1. Nonintractable headache, unspecified chronicity pattern, unspecified headache type    2. Vaginal bleeding          IPipo (Scribe), am scribing for, and in the presence of, Nissa Rodríguez D.O..    Electronically signed by: Pipo Carter (Scribe), 4/25/2017    INissa D.O. personally performed the services described in this documentation, as scribed by Pipo Carter in my presence, and it is both accurate and complete.    The note accurately reflects work and decisions made by me.  Nissa Rodríguez  4/25/2017  10:01 PM    tolerated the procedure well with no immediate complications     Daily dressings until dry with Neosporin and tube gauze. Return in 2 weeks are as needed.     - None  Keflex 500 mg 1 by mouth twice a day, Norco 7.5 dispense 5 pills.  Return in 2 weeks  Counseling:     I provided patient education verbally regarding:   Patient diagnosis, treatment options, as well as alternatives, risks, and benefits.     This note was created using Dragon voice recognition software that occasionally misinterpreted phrases or words.

## 2019-07-25 NOTE — TELEPHONE ENCOUNTER
Spoke with [facility] regarding prescription for [place] 5-325.  Pharmacist states that the patient was on Tramadol and the pharmacist advised to not take Tramadol with the [place].  Pharmacy just wanted to let us know.

## 2019-08-12 ENCOUNTER — OFFICE VISIT (OUTPATIENT)
Dept: PODIATRY | Facility: CLINIC | Age: 49
End: 2019-08-12
Payer: MEDICARE

## 2019-08-12 VITALS
BODY MASS INDEX: 34.07 KG/M2 | DIASTOLIC BLOOD PRESSURE: 80 MMHG | HEART RATE: 80 BPM | HEIGHT: 70 IN | SYSTOLIC BLOOD PRESSURE: 132 MMHG | WEIGHT: 238 LBS

## 2019-08-12 DIAGNOSIS — L60.0 INGROWN TOENAIL OF LEFT FOOT WITH INFECTION: Primary | ICD-10-CM

## 2019-08-12 PROCEDURE — 99999 PR PBB SHADOW E&M-EST. PATIENT-LVL III: CPT | Mod: PBBFAC,,, | Performed by: PODIATRIST

## 2019-08-12 PROCEDURE — 99999 PR PBB SHADOW E&M-EST. PATIENT-LVL III: ICD-10-PCS | Mod: PBBFAC,,, | Performed by: PODIATRIST

## 2019-08-12 PROCEDURE — 99213 OFFICE O/P EST LOW 20 MIN: CPT | Mod: PBBFAC | Performed by: PODIATRIST

## 2019-08-12 PROCEDURE — 99213 PR OFFICE/OUTPT VISIT, EST, LEVL III, 20-29 MIN: ICD-10-PCS | Mod: S$PBB,,, | Performed by: PODIATRIST

## 2019-08-12 PROCEDURE — 99213 OFFICE O/P EST LOW 20 MIN: CPT | Mod: S$PBB,,, | Performed by: PODIATRIST

## 2019-08-12 NOTE — PROGRESS NOTES
[address] [facility] [address]  [place] [address]  Phone: [phone]   [phone]    Patient's PCP:Daniel Young MD  Referring Provider: No ref. provider found    Subjective:      Chief Complaint:: Follow-up (PA left first toe med)    HPI  Bharath Parr is a 48 y.o. male who presents with a follow up from P&A left first toe medial border.Doing good.    There were no vitals filed for this visit.  Shoe Size:     Past Surgical History:   Procedure Laterality Date    CARDIAC CATHETERIZATION      left arm surgery      FB removal     Past Medical History:   Diagnosis Date    Anxiety     Asthma     Cardiomyopathy     CHF (congestive heart failure)     Hyperlipidemia     Hypertension     Low testosterone     Sleep apnea      Family History   Problem Relation Age of Onset    Cancer Mother         cervical    Heart disease Mother     Hypertension Mother     Cancer Maternal Grandmother         bladder    Macular degeneration Paternal Grandmother     Cancer Paternal Grandfather         skin        Social History:   Marital Status: Single  Alcohol History:  reports that he drinks alcohol.  Tobacco History:  reports that he quit smoking about 27 years ago. He has never used smokeless tobacco.  Drug History:  reports that he does not use drugs.    Review of patient's allergies indicates:   Allergen Reactions    Doxycycline      Makes my throat swell    Lyrica [pregabalin]      Memory loss    Micardis [telmisartan] Other (See Comments)     cough    Promethazine Other (See Comments)     Makes my skin crawl      Tetracyclines Other (See Comments) and Swelling       Current Outpatient Medications   Medication Sig Dispense Refill    albuterol (VENTOLIN HFA) 90 mcg/Actuation inhaler Three times a day PRN      azelastine-fluticasone (DYMISTA) 137-50 mcg/spray Spry nassal spray 1 spray by Each Nare route 2 (two) times daily.      carvedilol (COREG) 12.5 MG tablet Take 12.5 mg by mouth 2 (two) times  daily with meals.      cefUROXime (CEFTIN) 500 MG tablet Take 500 mg by mouth 2 (two) times daily.  0    cetirizine (ZYRTEC) 10 MG tablet Take 10 mg by mouth 2 (two) times daily.      ergocalciferol (ERGOCALCIFEROL) 50,000 unit Cap Take 50,000 Units by mouth every 7 days.      ezetimibe (ZETIA) 10 mg tablet Take 10 mg by mouth once daily.  5    HYDROcodone-acetaminophen (NORCO) 5-325 mg per tablet Take 1 tablet by mouth every 6 (six) hours as needed for Pain. 28 tablet 0    irbesartan (AVAPRO) 300 MG tablet Take 300 mg by mouth every evening.      levothyroxine (SYNTHROID) 75 MCG tablet Take 75 mcg by mouth once daily.      meloxicam (MOBIC) 15 MG tablet Take 15 mg by mouth once daily.  3    mupirocin (BACTROBAN) 2 % ointment APPLY A SMALL AMOUNT TO THE AFFECTED AREA TWICE A DAY  1    orphenadrine (NORFLEX) 100 mg tablet   1    pantoprazole (PROTONIX) 40 MG tablet       potassium chloride SA (K-DUR,KLOR-CON) 20 MEQ tablet Take 20 mEq by mouth once daily.      predniSONE (DELTASONE) 20 MG tablet       ranitidine (ZANTAC) 150 MG capsule Take 150 mg by mouth 2 (two) times daily.      testosterone cypionate (DEPOTESTOTERONE CYPIONATE) 200 mg/mL injection 200 mg every 14 (fourteen) days. every 10 days      traMADol (ULTRAM) 50 mg tablet Take 50 mg by mouth every 8 (eight) hours as needed.  0    VYVANSE 50 mg capsule TAKE 1 CAPSULE BY MOUTH ONCE DAILY FOR 30 DAYS  0     No current facility-administered medications for this visit.        Review of Systems      Objective:        Physical Exam:   Foot Exam  Physical Exam   Musculoskeletal:        Feet:        Imaging:            Assessment:       1. Ingrown toenail of left foot with infection      Plan:   Ingrown toenail of left foot with infection     Daily dressings of the left 1st toe until dry.  Return as needed.  No follow-ups on file.    Procedures - None    Counseling:     I provided patient education verbally regarding:   Patient diagnosis, treatment  options, as well as alternatives, risks, and benefits.     This note was created using Dragon voice recognition software that occasionally misinterpreted phrases or words.

## 2019-09-23 ENCOUNTER — HOSPITAL ENCOUNTER (OUTPATIENT)
Dept: RADIOLOGY | Facility: HOSPITAL | Age: 49
Discharge: HOME | End: 2019-09-23
Attending: INTERNAL MEDICINE
Payer: MEDICARE

## 2019-09-23 DIAGNOSIS — J30.9 ALLERGIC RHINITIS: Primary | ICD-10-CM

## 2019-09-23 DIAGNOSIS — J30.9 ALLERGIC RHINITIS: ICD-10-CM

## 2019-09-23 PROCEDURE — 70220 XR SINUSES MIN 3 VIEWS: ICD-10-PCS | Mod: 26,,, | Performed by: RADIOLOGY

## 2019-09-23 PROCEDURE — 70220 X-RAY EXAM OF SINUSES: CPT | Mod: TC,FY

## 2019-09-23 PROCEDURE — 70220 X-RAY EXAM OF SINUSES: CPT | Mod: 26,,, | Performed by: RADIOLOGY

## 2020-12-07 LAB
CHOLEST SERPL-MSCNC: 228 MG/DL (ref 0–200)
HDLC SERPL-MCNC: 42 MG/DL
LDLC SERPL CALC-MCNC: 141 MG/DL
TRIGL SERPL-MCNC: 291 MG/DL

## 2021-01-06 ENCOUNTER — OFFICE VISIT (OUTPATIENT)
Dept: FAMILY MEDICINE | Facility: CLINIC | Age: 51
End: 2021-01-06
Payer: MEDICARE

## 2021-01-06 VITALS
OXYGEN SATURATION: 98 % | RESPIRATION RATE: 18 BRPM | HEIGHT: 70 IN | BODY MASS INDEX: 35 KG/M2 | DIASTOLIC BLOOD PRESSURE: 88 MMHG | TEMPERATURE: 98 F | WEIGHT: 244.5 LBS | SYSTOLIC BLOOD PRESSURE: 138 MMHG | HEART RATE: 92 BPM

## 2021-01-06 DIAGNOSIS — I10 UNCONTROLLED HYPERTENSION: ICD-10-CM

## 2021-01-06 DIAGNOSIS — E55.9 VITAMIN D DEFICIENCY: ICD-10-CM

## 2021-01-06 DIAGNOSIS — F90.8 ATTENTION DEFICIT HYPERACTIVITY DISORDER (ADHD), OTHER TYPE: Primary | ICD-10-CM

## 2021-01-06 DIAGNOSIS — I42.0 DILATED CARDIOMYOPATHY: ICD-10-CM

## 2021-01-06 DIAGNOSIS — E66.01 SEVERE OBESITY (BMI 35.0-35.9 WITH COMORBIDITY): ICD-10-CM

## 2021-01-06 DIAGNOSIS — E03.2 HYPOTHYROIDISM DUE TO NON-MEDICATION EXOGENOUS SUBSTANCES: ICD-10-CM

## 2021-01-06 DIAGNOSIS — K21.9 GASTROESOPHAGEAL REFLUX DISEASE WITHOUT ESOPHAGITIS: ICD-10-CM

## 2021-01-06 PROBLEM — E03.9 HYPOTHYROID: Status: ACTIVE | Noted: 2021-01-06

## 2021-01-06 PROBLEM — R10.32 LLQ ABDOMINAL PAIN: Status: RESOLVED | Noted: 2017-08-23 | Resolved: 2021-01-06

## 2021-01-06 PROBLEM — L60.0 INGROWN TOENAIL OF LEFT FOOT WITH INFECTION: Status: RESOLVED | Noted: 2019-07-25 | Resolved: 2021-01-06

## 2021-01-06 PROBLEM — M79.675 GREAT TOE PAIN, LEFT: Status: RESOLVED | Noted: 2019-07-25 | Resolved: 2021-01-06

## 2021-01-06 PROBLEM — A09 DIARRHEA OF INFECTIOUS ORIGIN: Status: RESOLVED | Noted: 2017-08-23 | Resolved: 2021-01-06

## 2021-01-06 PROCEDURE — 99999 PR PBB SHADOW E&M-EST. PATIENT-LVL V: ICD-10-PCS | Mod: PBBFAC,,, | Performed by: PHYSICIAN ASSISTANT

## 2021-01-06 PROCEDURE — 99999 PR PBB SHADOW E&M-EST. PATIENT-LVL V: CPT | Mod: PBBFAC,,, | Performed by: PHYSICIAN ASSISTANT

## 2021-01-06 PROCEDURE — 99215 OFFICE O/P EST HI 40 MIN: CPT | Mod: PBBFAC,PO | Performed by: PHYSICIAN ASSISTANT

## 2021-01-06 PROCEDURE — 99204 PR OFFICE/OUTPT VISIT, NEW, LEVL IV, 45-59 MIN: ICD-10-PCS | Mod: S$PBB,,, | Performed by: PHYSICIAN ASSISTANT

## 2021-01-06 PROCEDURE — 99204 OFFICE O/P NEW MOD 45 MIN: CPT | Mod: S$PBB,,, | Performed by: PHYSICIAN ASSISTANT

## 2021-01-06 RX ORDER — EPINEPHRINE 0.3 MG/.3ML
INJECTION SUBCUTANEOUS
COMMUNITY
Start: 2020-11-24 | End: 2022-01-10 | Stop reason: SDUPTHER

## 2021-01-06 RX ORDER — LISDEXAMFETAMINE DIMESYLATE 50 MG/1
CAPSULE ORAL
Qty: 30 CAPSULE | Refills: 0 | Status: CANCELLED | OUTPATIENT
Start: 2021-02-28

## 2021-01-06 RX ORDER — OLOPATADINE HYDROCHLORIDE 2 MG/ML
SOLUTION/ DROPS OPHTHALMIC
COMMUNITY
Start: 2020-12-26 | End: 2021-06-16 | Stop reason: SDUPTHER

## 2021-01-06 RX ORDER — FAMOTIDINE 20 MG/1
20 TABLET, FILM COATED ORAL 2 TIMES DAILY
COMMUNITY

## 2021-02-05 ENCOUNTER — PATIENT MESSAGE (OUTPATIENT)
Dept: FAMILY MEDICINE | Facility: CLINIC | Age: 51
End: 2021-02-05

## 2021-02-23 ENCOUNTER — PATIENT MESSAGE (OUTPATIENT)
Dept: FAMILY MEDICINE | Facility: CLINIC | Age: 51
End: 2021-02-23

## 2021-02-23 DIAGNOSIS — F90.9 ATTENTION DEFICIT HYPERACTIVITY DISORDER (ADHD), UNSPECIFIED ADHD TYPE: ICD-10-CM

## 2021-02-23 DIAGNOSIS — M19.90 OSTEOARTHRITIS, UNSPECIFIED OSTEOARTHRITIS TYPE, UNSPECIFIED SITE: Primary | ICD-10-CM

## 2021-02-24 RX ORDER — MELOXICAM 15 MG/1
15 TABLET ORAL DAILY
Qty: 30 TABLET | Refills: 5 | Status: SHIPPED | OUTPATIENT
Start: 2021-02-24 | End: 2021-07-26

## 2021-02-24 RX ORDER — LISDEXAMFETAMINE DIMESYLATE 50 MG/1
CAPSULE ORAL
Qty: 30 CAPSULE | Refills: 0 | Status: SHIPPED | OUTPATIENT
Start: 2021-02-26 | End: 2021-03-16 | Stop reason: SDUPTHER

## 2021-03-03 DIAGNOSIS — K21.9 GASTROESOPHAGEAL REFLUX DISEASE WITHOUT ESOPHAGITIS: Primary | ICD-10-CM

## 2021-03-03 RX ORDER — PANTOPRAZOLE SODIUM 40 MG/1
40 TABLET, DELAYED RELEASE ORAL DAILY
Qty: 90 TABLET | Refills: 0 | Status: SHIPPED | OUTPATIENT
Start: 2021-03-03 | End: 2021-06-16 | Stop reason: SDUPTHER

## 2021-03-16 ENCOUNTER — OFFICE VISIT (OUTPATIENT)
Dept: FAMILY MEDICINE | Facility: CLINIC | Age: 51
End: 2021-03-16
Payer: MEDICARE

## 2021-03-16 VITALS
TEMPERATURE: 97 F | BODY MASS INDEX: 35.02 KG/M2 | HEIGHT: 70 IN | WEIGHT: 244.63 LBS | DIASTOLIC BLOOD PRESSURE: 82 MMHG | SYSTOLIC BLOOD PRESSURE: 140 MMHG

## 2021-03-16 DIAGNOSIS — F90.8 ATTENTION DEFICIT HYPERACTIVITY DISORDER (ADHD), OTHER TYPE: Primary | ICD-10-CM

## 2021-03-16 DIAGNOSIS — F90.9 ATTENTION DEFICIT HYPERACTIVITY DISORDER (ADHD), UNSPECIFIED ADHD TYPE: ICD-10-CM

## 2021-03-16 PROCEDURE — 99213 PR OFFICE/OUTPT VISIT, EST, LEVL III, 20-29 MIN: ICD-10-PCS | Mod: S$PBB,,, | Performed by: PHYSICIAN ASSISTANT

## 2021-03-16 PROCEDURE — 99999 PR PBB SHADOW E&M-EST. PATIENT-LVL V: ICD-10-PCS | Mod: PBBFAC,,, | Performed by: PHYSICIAN ASSISTANT

## 2021-03-16 PROCEDURE — 99999 PR PBB SHADOW E&M-EST. PATIENT-LVL V: CPT | Mod: PBBFAC,,, | Performed by: PHYSICIAN ASSISTANT

## 2021-03-16 PROCEDURE — 99215 OFFICE O/P EST HI 40 MIN: CPT | Mod: PBBFAC,PN | Performed by: PHYSICIAN ASSISTANT

## 2021-03-16 PROCEDURE — 99213 OFFICE O/P EST LOW 20 MIN: CPT | Mod: S$PBB,,, | Performed by: PHYSICIAN ASSISTANT

## 2021-03-16 RX ORDER — ACETAMINOPHEN 500 MG
500 TABLET ORAL EVERY 6 HOURS PRN
COMMUNITY

## 2021-03-16 RX ORDER — LISDEXAMFETAMINE DIMESYLATE 50 MG/1
CAPSULE ORAL
Qty: 30 CAPSULE | Refills: 0 | Status: SHIPPED | OUTPATIENT
Start: 2021-05-26 | End: 2021-06-16 | Stop reason: SDUPTHER

## 2021-03-16 RX ORDER — LISDEXAMFETAMINE DIMESYLATE 50 MG/1
CAPSULE ORAL
Qty: 30 CAPSULE | Refills: 0 | Status: SHIPPED | OUTPATIENT
Start: 2021-04-26 | End: 2021-06-16 | Stop reason: SDUPTHER

## 2021-03-16 RX ORDER — LISDEXAMFETAMINE DIMESYLATE 50 MG/1
CAPSULE ORAL
Qty: 30 CAPSULE | Refills: 0 | Status: SHIPPED | OUTPATIENT
Start: 2021-03-26 | End: 2021-06-16 | Stop reason: SDUPTHER

## 2021-03-16 RX ORDER — IRBESARTAN 75 MG/1
75 TABLET ORAL DAILY
COMMUNITY
Start: 2021-03-03 | End: 2024-02-08 | Stop reason: DRUGHIGH

## 2021-04-07 ENCOUNTER — OFFICE VISIT (OUTPATIENT)
Dept: RHEUMATOLOGY | Facility: CLINIC | Age: 51
End: 2021-04-07
Payer: MEDICARE

## 2021-04-07 VITALS
SYSTOLIC BLOOD PRESSURE: 151 MMHG | BODY MASS INDEX: 35.24 KG/M2 | DIASTOLIC BLOOD PRESSURE: 92 MMHG | WEIGHT: 246.13 LBS | HEART RATE: 80 BPM | HEIGHT: 70 IN

## 2021-04-07 DIAGNOSIS — M15.9 OSTEOARTHRITIS, GENERALIZED: ICD-10-CM

## 2021-04-07 DIAGNOSIS — E55.9 HYPOVITAMINOSIS D: ICD-10-CM

## 2021-04-07 DIAGNOSIS — M79.7 FIBROMYALGIA: Primary | ICD-10-CM

## 2021-04-07 PROCEDURE — 99203 OFFICE O/P NEW LOW 30 MIN: CPT | Mod: S$GLB,,, | Performed by: INTERNAL MEDICINE

## 2021-04-07 PROCEDURE — 99203 PR OFFICE/OUTPT VISIT, NEW, LEVL III, 30-44 MIN: ICD-10-PCS | Mod: S$GLB,,, | Performed by: INTERNAL MEDICINE

## 2021-04-26 LAB
25(OH)D3 SERPL-MCNC: 38 NG/ML (ref 30–100)
BASOPHILS # BLD AUTO: 43 CELLS/UL (ref 0–200)
BASOPHILS NFR BLD AUTO: 0.7 %
CRP SERPL-MCNC: 10.9 MG/L
EOSINOPHIL # BLD AUTO: 201 CELLS/UL (ref 15–500)
EOSINOPHIL NFR BLD AUTO: 3.3 %
ERYTHROCYTE [DISTWIDTH] IN BLOOD BY AUTOMATED COUNT: 13.1 % (ref 11–15)
ERYTHROCYTE [SEDIMENTATION RATE] IN BLOOD BY WESTERGREN METHOD: 28 MM/H
HCT VFR BLD AUTO: 41.3 % (ref 38.5–50)
HGB BLD-MCNC: 13.6 G/DL (ref 13.2–17.1)
LYMPHOCYTES # BLD AUTO: 1293 CELLS/UL (ref 850–3900)
LYMPHOCYTES NFR BLD AUTO: 21.2 %
MCH RBC QN AUTO: 30 PG (ref 27–33)
MCHC RBC AUTO-ENTMCNC: 32.9 G/DL (ref 32–36)
MCV RBC AUTO: 91.2 FL (ref 80–100)
MONOCYTES # BLD AUTO: 488 CELLS/UL (ref 200–950)
MONOCYTES NFR BLD AUTO: 8 %
NEUTROPHILS # BLD AUTO: 4075 CELLS/UL (ref 1500–7800)
NEUTROPHILS NFR BLD AUTO: 66.8 %
PLATELET # BLD AUTO: 306 THOUSAND/UL (ref 140–400)
PMV BLD REES-ECKER: 9.4 FL (ref 7.5–12.5)
RBC # BLD AUTO: 4.53 MILLION/UL (ref 4.2–5.8)
URATE SERPL-MCNC: 7.6 MG/DL (ref 4–8)
WBC # BLD AUTO: 6.1 THOUSAND/UL (ref 3.8–10.8)

## 2021-06-16 ENCOUNTER — OFFICE VISIT (OUTPATIENT)
Dept: FAMILY MEDICINE | Facility: CLINIC | Age: 51
End: 2021-06-16
Payer: MEDICARE

## 2021-06-16 VITALS
WEIGHT: 240 LBS | OXYGEN SATURATION: 98 % | DIASTOLIC BLOOD PRESSURE: 78 MMHG | SYSTOLIC BLOOD PRESSURE: 124 MMHG | BODY MASS INDEX: 34.36 KG/M2 | TEMPERATURE: 98 F | HEIGHT: 70 IN | HEART RATE: 83 BPM

## 2021-06-16 DIAGNOSIS — F90.9 ATTENTION DEFICIT HYPERACTIVITY DISORDER (ADHD), UNSPECIFIED ADHD TYPE: ICD-10-CM

## 2021-06-16 DIAGNOSIS — H10.13 ALLERGIC CONJUNCTIVITIS OF BOTH EYES: ICD-10-CM

## 2021-06-16 DIAGNOSIS — J30.2 SEASONAL ALLERGIES: Primary | ICD-10-CM

## 2021-06-16 DIAGNOSIS — K21.9 GASTROESOPHAGEAL REFLUX DISEASE WITHOUT ESOPHAGITIS: ICD-10-CM

## 2021-06-16 PROCEDURE — 99214 OFFICE O/P EST MOD 30 MIN: CPT | Mod: S$PBB,,, | Performed by: FAMILY MEDICINE

## 2021-06-16 PROCEDURE — 99214 PR OFFICE/OUTPT VISIT, EST, LEVL IV, 30-39 MIN: ICD-10-PCS | Mod: S$PBB,,, | Performed by: FAMILY MEDICINE

## 2021-06-16 PROCEDURE — 99999 PR PBB SHADOW E&M-EST. PATIENT-LVL IV: ICD-10-PCS | Mod: PBBFAC,,, | Performed by: FAMILY MEDICINE

## 2021-06-16 PROCEDURE — 99214 OFFICE O/P EST MOD 30 MIN: CPT | Mod: PBBFAC,PN | Performed by: FAMILY MEDICINE

## 2021-06-16 PROCEDURE — 99999 PR PBB SHADOW E&M-EST. PATIENT-LVL IV: CPT | Mod: PBBFAC,,, | Performed by: FAMILY MEDICINE

## 2021-06-16 RX ORDER — LISDEXAMFETAMINE DIMESYLATE 50 MG/1
CAPSULE ORAL
Qty: 30 CAPSULE | Refills: 0 | Status: SHIPPED | OUTPATIENT
Start: 2021-06-16 | End: 2021-09-15

## 2021-06-16 RX ORDER — LISDEXAMFETAMINE DIMESYLATE 50 MG/1
CAPSULE ORAL
Qty: 30 CAPSULE | Refills: 0 | Status: SHIPPED | OUTPATIENT
Start: 2021-07-16 | End: 2021-09-15

## 2021-06-16 RX ORDER — OLOPATADINE HYDROCHLORIDE 2 MG/ML
SOLUTION/ DROPS OPHTHALMIC
Qty: 2.5 ML | Refills: 5 | Status: SHIPPED | OUTPATIENT
Start: 2021-06-16 | End: 2021-10-12 | Stop reason: SDUPTHER

## 2021-06-16 RX ORDER — METHYLPREDNISOLONE 4 MG/1
4 TABLET ORAL
COMMUNITY
Start: 2021-04-29 | End: 2021-12-10

## 2021-06-16 RX ORDER — LISDEXAMFETAMINE DIMESYLATE 50 MG/1
CAPSULE ORAL
Qty: 30 CAPSULE | Refills: 0 | Status: SHIPPED | OUTPATIENT
Start: 2021-08-16 | End: 2021-09-15

## 2021-06-16 RX ORDER — AZELASTINE HYDROCHLORIDE, FLUTICASONE PROPIONATE 137; 50 UG/1; UG/1
1 SPRAY, METERED NASAL 2 TIMES DAILY
Qty: 23 G | Refills: 5 | Status: SHIPPED | OUTPATIENT
Start: 2021-06-16 | End: 2021-10-12

## 2021-06-16 RX ORDER — PANTOPRAZOLE SODIUM 40 MG/1
40 TABLET, DELAYED RELEASE ORAL DAILY
Qty: 90 TABLET | Refills: 0 | Status: SHIPPED | OUTPATIENT
Start: 2021-06-16 | End: 2021-09-10

## 2021-07-08 ENCOUNTER — OFFICE VISIT (OUTPATIENT)
Dept: RHEUMATOLOGY | Facility: CLINIC | Age: 51
End: 2021-07-08
Payer: MEDICARE

## 2021-07-08 VITALS
WEIGHT: 239.69 LBS | BODY MASS INDEX: 34.39 KG/M2 | DIASTOLIC BLOOD PRESSURE: 79 MMHG | SYSTOLIC BLOOD PRESSURE: 125 MMHG

## 2021-07-08 DIAGNOSIS — M79.7 FIBROMYALGIA: ICD-10-CM

## 2021-07-08 DIAGNOSIS — M15.9 OSTEOARTHRITIS, GENERALIZED: Primary | ICD-10-CM

## 2021-07-08 PROCEDURE — 99213 OFFICE O/P EST LOW 20 MIN: CPT | Mod: S$GLB,,, | Performed by: INTERNAL MEDICINE

## 2021-07-08 PROCEDURE — 99213 PR OFFICE/OUTPT VISIT, EST, LEVL III, 20-29 MIN: ICD-10-PCS | Mod: S$GLB,,, | Performed by: INTERNAL MEDICINE

## 2021-07-26 DIAGNOSIS — M19.90 OSTEOARTHRITIS, UNSPECIFIED OSTEOARTHRITIS TYPE, UNSPECIFIED SITE: ICD-10-CM

## 2021-07-26 DIAGNOSIS — I10 UNCONTROLLED HYPERTENSION: Primary | ICD-10-CM

## 2021-07-26 RX ORDER — MELOXICAM 15 MG/1
TABLET ORAL
Qty: 30 TABLET | Refills: 0 | Status: SHIPPED | OUTPATIENT
Start: 2021-07-26 | End: 2021-08-17

## 2021-07-27 ENCOUNTER — PATIENT OUTREACH (OUTPATIENT)
Dept: ADMINISTRATIVE | Facility: HOSPITAL | Age: 51
End: 2021-07-27

## 2021-08-16 DIAGNOSIS — M19.90 OSTEOARTHRITIS, UNSPECIFIED OSTEOARTHRITIS TYPE, UNSPECIFIED SITE: ICD-10-CM

## 2021-08-17 RX ORDER — MELOXICAM 15 MG/1
TABLET ORAL
Qty: 30 TABLET | Refills: 5 | Status: SHIPPED | OUTPATIENT
Start: 2021-08-17 | End: 2021-08-23

## 2021-08-23 DIAGNOSIS — M19.90 OSTEOARTHRITIS, UNSPECIFIED OSTEOARTHRITIS TYPE, UNSPECIFIED SITE: ICD-10-CM

## 2021-08-23 RX ORDER — MELOXICAM 15 MG/1
TABLET ORAL
Qty: 30 TABLET | Refills: 2 | Status: SHIPPED | OUTPATIENT
Start: 2021-08-23 | End: 2022-03-21

## 2021-09-14 ENCOUNTER — TELEPHONE (OUTPATIENT)
Dept: FAMILY MEDICINE | Facility: CLINIC | Age: 51
End: 2021-09-14

## 2021-09-14 NOTE — TELEPHONE ENCOUNTER
Pt has an appt tomorrow he wants to know if it can be changed to a virtual visit because he recently had knee surgery on Friday and cannot get up and move around yet and he doesn't want to reschedule because he needs his medication refilled

## 2021-09-15 ENCOUNTER — OFFICE VISIT (OUTPATIENT)
Dept: FAMILY MEDICINE | Facility: CLINIC | Age: 51
End: 2021-09-15
Payer: MEDICARE

## 2021-09-15 DIAGNOSIS — E78.5 HYPERLIPIDEMIA, UNSPECIFIED HYPERLIPIDEMIA TYPE: ICD-10-CM

## 2021-09-15 DIAGNOSIS — Z11.4 SCREENING FOR HIV WITHOUT PRESENCE OF RISK FACTORS: ICD-10-CM

## 2021-09-15 DIAGNOSIS — I10 ESSENTIAL HYPERTENSION: ICD-10-CM

## 2021-09-15 DIAGNOSIS — Z11.59 ENCOUNTER FOR HEPATITIS C SCREENING TEST FOR LOW RISK PATIENT: ICD-10-CM

## 2021-09-15 DIAGNOSIS — E03.9 ACQUIRED HYPOTHYROIDISM: ICD-10-CM

## 2021-09-15 DIAGNOSIS — F90.9 ATTENTION DEFICIT HYPERACTIVITY DISORDER (ADHD), UNSPECIFIED ADHD TYPE: Primary | ICD-10-CM

## 2021-09-15 PROCEDURE — 99214 OFFICE O/P EST MOD 30 MIN: CPT | Mod: 95,,, | Performed by: FAMILY MEDICINE

## 2021-09-15 PROCEDURE — 99214 PR OFFICE/OUTPT VISIT, EST, LEVL IV, 30-39 MIN: ICD-10-PCS | Mod: 95,,, | Performed by: FAMILY MEDICINE

## 2021-09-15 RX ORDER — LISDEXAMFETAMINE DIMESYLATE 50 MG/1
CAPSULE ORAL
Qty: 30 CAPSULE | Refills: 0 | Status: SHIPPED | OUTPATIENT
Start: 2021-09-15 | End: 2021-12-10

## 2021-09-15 RX ORDER — LISDEXAMFETAMINE DIMESYLATE 50 MG/1
CAPSULE ORAL
Qty: 30 CAPSULE | Refills: 0 | Status: SHIPPED | OUTPATIENT
Start: 2021-10-15 | End: 2021-12-10

## 2021-09-15 RX ORDER — LISDEXAMFETAMINE DIMESYLATE 50 MG/1
CAPSULE ORAL
Qty: 30 CAPSULE | Refills: 0 | Status: SHIPPED | OUTPATIENT
Start: 2021-11-14 | End: 2021-12-10

## 2021-09-27 DIAGNOSIS — M17.11 UNILATERAL PRIMARY OSTEOARTHRITIS, RIGHT KNEE: Primary | ICD-10-CM

## 2021-09-29 ENCOUNTER — CLINICAL SUPPORT (OUTPATIENT)
Dept: REHABILITATION | Facility: HOSPITAL | Age: 51
End: 2021-09-29
Payer: MEDICARE

## 2021-09-29 DIAGNOSIS — R26.89 DECREASED FUNCTIONAL MOBILITY: ICD-10-CM

## 2021-09-29 DIAGNOSIS — M25.60 DECREASED RANGE OF MOTION: ICD-10-CM

## 2021-09-29 DIAGNOSIS — R53.1 DECREASED STRENGTH: ICD-10-CM

## 2021-09-29 PROCEDURE — 97110 THERAPEUTIC EXERCISES: CPT | Mod: PN

## 2021-09-29 PROCEDURE — 97161 PT EVAL LOW COMPLEX 20 MIN: CPT | Mod: PN

## 2021-09-30 PROBLEM — R26.89 DECREASED FUNCTIONAL MOBILITY: Status: ACTIVE | Noted: 2021-09-30

## 2021-09-30 PROBLEM — R53.1 DECREASED STRENGTH: Status: ACTIVE | Noted: 2021-09-30

## 2021-09-30 PROBLEM — M25.60 DECREASED RANGE OF MOTION: Status: ACTIVE | Noted: 2021-09-30

## 2021-10-06 ENCOUNTER — CLINICAL SUPPORT (OUTPATIENT)
Dept: REHABILITATION | Facility: HOSPITAL | Age: 51
End: 2021-10-06
Payer: MEDICARE

## 2021-10-06 DIAGNOSIS — R53.1 DECREASED STRENGTH: ICD-10-CM

## 2021-10-06 DIAGNOSIS — M25.60 DECREASED RANGE OF MOTION: ICD-10-CM

## 2021-10-06 DIAGNOSIS — R26.89 DECREASED FUNCTIONAL MOBILITY: ICD-10-CM

## 2021-10-06 PROCEDURE — 97110 THERAPEUTIC EXERCISES: CPT | Mod: PN,CQ

## 2021-10-08 ENCOUNTER — CLINICAL SUPPORT (OUTPATIENT)
Dept: REHABILITATION | Facility: HOSPITAL | Age: 51
End: 2021-10-08
Payer: MEDICARE

## 2021-10-08 DIAGNOSIS — M25.60 DECREASED RANGE OF MOTION: ICD-10-CM

## 2021-10-08 DIAGNOSIS — R53.1 DECREASED STRENGTH: ICD-10-CM

## 2021-10-08 DIAGNOSIS — R26.89 DECREASED FUNCTIONAL MOBILITY: ICD-10-CM

## 2021-10-08 PROCEDURE — 97110 THERAPEUTIC EXERCISES: CPT | Mod: PN

## 2021-10-08 PROCEDURE — 97140 MANUAL THERAPY 1/> REGIONS: CPT | Mod: PN

## 2021-10-18 ENCOUNTER — CLINICAL SUPPORT (OUTPATIENT)
Dept: REHABILITATION | Facility: HOSPITAL | Age: 51
End: 2021-10-18
Payer: MEDICARE

## 2021-10-18 DIAGNOSIS — M25.60 DECREASED RANGE OF MOTION: ICD-10-CM

## 2021-10-18 DIAGNOSIS — R53.1 DECREASED STRENGTH: ICD-10-CM

## 2021-10-18 DIAGNOSIS — R26.89 DECREASED FUNCTIONAL MOBILITY: ICD-10-CM

## 2021-10-18 PROCEDURE — 97110 THERAPEUTIC EXERCISES: CPT | Mod: PN,CQ

## 2021-10-22 ENCOUNTER — CLINICAL SUPPORT (OUTPATIENT)
Dept: REHABILITATION | Facility: HOSPITAL | Age: 51
End: 2021-10-22
Payer: MEDICARE

## 2021-10-22 DIAGNOSIS — R53.1 DECREASED STRENGTH: ICD-10-CM

## 2021-10-22 DIAGNOSIS — M25.60 DECREASED RANGE OF MOTION: ICD-10-CM

## 2021-10-22 DIAGNOSIS — R26.89 DECREASED FUNCTIONAL MOBILITY: ICD-10-CM

## 2021-10-22 PROCEDURE — 97110 THERAPEUTIC EXERCISES: CPT | Mod: PN

## 2021-10-22 PROCEDURE — 97140 MANUAL THERAPY 1/> REGIONS: CPT | Mod: PN

## 2021-10-28 ENCOUNTER — CLINICAL SUPPORT (OUTPATIENT)
Dept: REHABILITATION | Facility: HOSPITAL | Age: 51
End: 2021-10-28
Payer: MEDICARE

## 2021-10-28 DIAGNOSIS — M25.60 DECREASED RANGE OF MOTION: ICD-10-CM

## 2021-10-28 DIAGNOSIS — R26.89 DECREASED FUNCTIONAL MOBILITY: ICD-10-CM

## 2021-10-28 DIAGNOSIS — R53.1 DECREASED STRENGTH: ICD-10-CM

## 2021-10-28 PROCEDURE — 97140 MANUAL THERAPY 1/> REGIONS: CPT | Mod: PN

## 2021-10-28 PROCEDURE — 97110 THERAPEUTIC EXERCISES: CPT | Mod: PN

## 2021-11-05 ENCOUNTER — TELEPHONE (OUTPATIENT)
Dept: FAMILY MEDICINE | Facility: CLINIC | Age: 51
End: 2021-11-05
Payer: MEDICARE

## 2021-11-05 ENCOUNTER — CLINICAL SUPPORT (OUTPATIENT)
Dept: REHABILITATION | Facility: HOSPITAL | Age: 51
End: 2021-11-05
Payer: MEDICARE

## 2021-11-05 DIAGNOSIS — R53.1 DECREASED STRENGTH: ICD-10-CM

## 2021-11-05 DIAGNOSIS — R26.89 DECREASED FUNCTIONAL MOBILITY: ICD-10-CM

## 2021-11-05 DIAGNOSIS — M25.60 DECREASED RANGE OF MOTION: ICD-10-CM

## 2021-11-05 PROCEDURE — 97110 THERAPEUTIC EXERCISES: CPT | Mod: PN

## 2021-11-05 PROCEDURE — 97140 MANUAL THERAPY 1/> REGIONS: CPT | Mod: PN

## 2021-11-08 LAB
ALBUMIN SERPL-MCNC: 4.7 G/DL (ref 3.6–5.1)
ALBUMIN/GLOB SERPL: 1.4 (CALC) (ref 1–2.5)
ALP SERPL-CCNC: 92 U/L (ref 36–130)
ALT SERPL-CCNC: 11 U/L (ref 9–46)
AST SERPL-CCNC: 14 U/L (ref 10–40)
BASOPHILS # BLD AUTO: 48 CELLS/UL (ref 0–200)
BASOPHILS NFR BLD AUTO: 0.7 %
BILIRUB SERPL-MCNC: 0.4 MG/DL (ref 0.2–1.2)
BUN SERPL-MCNC: 10 MG/DL (ref 7–25)
BUN/CREAT SERPL: NORMAL (CALC) (ref 6–22)
CALCIUM SERPL-MCNC: 9.9 MG/DL (ref 8.6–10.3)
CHLORIDE SERPL-SCNC: 99 MMOL/L (ref 98–110)
CHOLEST SERPL-MCNC: 220 MG/DL
CHOLEST/HDLC SERPL: 5 (CALC)
CO2 SERPL-SCNC: 30 MMOL/L (ref 20–32)
CREAT SERPL-MCNC: 1.21 MG/DL (ref 0.6–1.35)
EOSINOPHIL # BLD AUTO: 352 CELLS/UL (ref 15–500)
EOSINOPHIL NFR BLD AUTO: 5.1 %
ERYTHROCYTE [DISTWIDTH] IN BLOOD BY AUTOMATED COUNT: 12.2 % (ref 11–15)
GLOBULIN SER CALC-MCNC: 3.4 G/DL (CALC) (ref 1.9–3.7)
GLUCOSE SERPL-MCNC: 85 MG/DL (ref 65–99)
HCT VFR BLD AUTO: 42.3 % (ref 38.5–50)
HCV AB S/CO SERPL IA: 0.01
HCV AB SERPL QL IA: NORMAL
HDLC SERPL-MCNC: 44 MG/DL
HGB BLD-MCNC: 13.7 G/DL (ref 13.2–17.1)
HIV 1+2 AB+HIV1 P24 AG SERPL QL IA: NORMAL
LDLC SERPL CALC-MCNC: 138 MG/DL (CALC)
LYMPHOCYTES # BLD AUTO: 1594 CELLS/UL (ref 850–3900)
LYMPHOCYTES NFR BLD AUTO: 23.1 %
MCH RBC QN AUTO: 28.8 PG (ref 27–33)
MCHC RBC AUTO-ENTMCNC: 32.4 G/DL (ref 32–36)
MCV RBC AUTO: 88.9 FL (ref 80–100)
MONOCYTES # BLD AUTO: 449 CELLS/UL (ref 200–950)
MONOCYTES NFR BLD AUTO: 6.5 %
NEUTROPHILS # BLD AUTO: 4457 CELLS/UL (ref 1500–7800)
NEUTROPHILS NFR BLD AUTO: 64.6 %
NONHDLC SERPL-MCNC: 176 MG/DL (CALC)
PLATELET # BLD AUTO: 392 THOUSAND/UL (ref 140–400)
PMV BLD REES-ECKER: 8.8 FL (ref 7.5–12.5)
POTASSIUM SERPL-SCNC: 4.4 MMOL/L (ref 3.5–5.3)
PROT SERPL-MCNC: 8.1 G/DL (ref 6.1–8.1)
RBC # BLD AUTO: 4.76 MILLION/UL (ref 4.2–5.8)
SODIUM SERPL-SCNC: 137 MMOL/L (ref 135–146)
TRIGL SERPL-MCNC: 249 MG/DL
TSH SERPL-ACNC: 0.9 MIU/L (ref 0.4–4.5)
WBC # BLD AUTO: 6.9 THOUSAND/UL (ref 3.8–10.8)

## 2021-11-09 ENCOUNTER — TELEPHONE (OUTPATIENT)
Dept: FAMILY MEDICINE | Facility: CLINIC | Age: 51
End: 2021-11-09
Payer: MEDICARE

## 2021-11-10 ENCOUNTER — CLINICAL SUPPORT (OUTPATIENT)
Dept: REHABILITATION | Facility: HOSPITAL | Age: 51
End: 2021-11-10
Payer: MEDICARE

## 2021-11-10 DIAGNOSIS — M25.60 DECREASED RANGE OF MOTION: ICD-10-CM

## 2021-11-10 DIAGNOSIS — R53.1 DECREASED STRENGTH: ICD-10-CM

## 2021-11-10 DIAGNOSIS — R26.89 DECREASED FUNCTIONAL MOBILITY: ICD-10-CM

## 2021-11-10 PROCEDURE — 97140 MANUAL THERAPY 1/> REGIONS: CPT | Mod: PN

## 2021-11-10 PROCEDURE — 97110 THERAPEUTIC EXERCISES: CPT | Mod: PN

## 2021-12-10 ENCOUNTER — OFFICE VISIT (OUTPATIENT)
Dept: FAMILY MEDICINE | Facility: CLINIC | Age: 51
End: 2021-12-10
Payer: MEDICARE

## 2021-12-10 VITALS
RESPIRATION RATE: 16 BRPM | WEIGHT: 247.88 LBS | OXYGEN SATURATION: 98 % | BODY MASS INDEX: 35.49 KG/M2 | HEART RATE: 83 BPM | HEIGHT: 70 IN | TEMPERATURE: 98 F

## 2021-12-10 DIAGNOSIS — M79.7 FIBROMYALGIA: ICD-10-CM

## 2021-12-10 DIAGNOSIS — I42.0 DILATED CARDIOMYOPATHY: Primary | ICD-10-CM

## 2021-12-10 DIAGNOSIS — E03.9 ACQUIRED HYPOTHYROIDISM: ICD-10-CM

## 2021-12-10 DIAGNOSIS — G47.33 OBSTRUCTIVE SLEEP APNEA SYNDROME: ICD-10-CM

## 2021-12-10 DIAGNOSIS — F90.9 ATTENTION DEFICIT HYPERACTIVITY DISORDER (ADHD), UNSPECIFIED ADHD TYPE: ICD-10-CM

## 2021-12-10 DIAGNOSIS — Z76.89 ESTABLISHING CARE WITH NEW DOCTOR, ENCOUNTER FOR: ICD-10-CM

## 2021-12-10 DIAGNOSIS — G89.29 OTHER CHRONIC PAIN: ICD-10-CM

## 2021-12-10 DIAGNOSIS — I10 PRIMARY HYPERTENSION: ICD-10-CM

## 2021-12-10 DIAGNOSIS — E55.9 VITAMIN D DEFICIENCY: ICD-10-CM

## 2021-12-10 DIAGNOSIS — K21.9 GASTROESOPHAGEAL REFLUX DISEASE WITHOUT ESOPHAGITIS: ICD-10-CM

## 2021-12-10 PROCEDURE — 99205 OFFICE O/P NEW HI 60 MIN: CPT | Mod: S$PBB,,, | Performed by: FAMILY MEDICINE

## 2021-12-10 PROCEDURE — 99215 OFFICE O/P EST HI 40 MIN: CPT | Performed by: FAMILY MEDICINE

## 2021-12-10 PROCEDURE — 99205 PR OFFICE/OUTPT VISIT, NEW, LEVL V, 60-74 MIN: ICD-10-PCS | Mod: S$PBB,,, | Performed by: FAMILY MEDICINE

## 2021-12-10 RX ORDER — LISDEXAMFETAMINE DIMESYLATE 50 MG/1
CAPSULE ORAL
Qty: 30 CAPSULE | Refills: 0 | Status: SHIPPED | OUTPATIENT
Start: 2021-12-10 | End: 2022-01-10 | Stop reason: SDUPTHER

## 2021-12-10 RX ORDER — OXYCODONE AND ACETAMINOPHEN 10; 325 MG/1; MG/1
1 TABLET ORAL EVERY 4 HOURS PRN
COMMUNITY
End: 2022-09-13

## 2021-12-11 PROBLEM — G89.29 CHRONIC PAIN: Status: ACTIVE | Noted: 2021-12-11

## 2022-01-10 ENCOUNTER — OFFICE VISIT (OUTPATIENT)
Dept: FAMILY MEDICINE | Facility: CLINIC | Age: 52
End: 2022-01-10
Payer: MEDICARE

## 2022-01-10 VITALS
SYSTOLIC BLOOD PRESSURE: 120 MMHG | OXYGEN SATURATION: 98 % | DIASTOLIC BLOOD PRESSURE: 70 MMHG | HEART RATE: 75 BPM | BODY MASS INDEX: 35.79 KG/M2 | WEIGHT: 250 LBS | RESPIRATION RATE: 18 BRPM | HEIGHT: 70 IN

## 2022-01-10 DIAGNOSIS — F90.9 ATTENTION DEFICIT HYPERACTIVITY DISORDER (ADHD), UNSPECIFIED ADHD TYPE: Primary | ICD-10-CM

## 2022-01-10 DIAGNOSIS — Z88.9 HISTORY OF SEVERE ALLERGIC REACTION: ICD-10-CM

## 2022-01-10 PROCEDURE — 99215 OFFICE O/P EST HI 40 MIN: CPT | Performed by: FAMILY MEDICINE

## 2022-01-10 PROCEDURE — 99213 OFFICE O/P EST LOW 20 MIN: CPT | Mod: S$PBB,,, | Performed by: FAMILY MEDICINE

## 2022-01-10 PROCEDURE — 99213 PR OFFICE/OUTPT VISIT, EST, LEVL III, 20-29 MIN: ICD-10-PCS | Mod: S$PBB,,, | Performed by: FAMILY MEDICINE

## 2022-01-10 RX ORDER — EPINEPHRINE 0.3 MG/.3ML
INJECTION SUBCUTANEOUS
Qty: 2 EACH | Refills: 1 | Status: SHIPPED | OUTPATIENT
Start: 2022-01-10 | End: 2023-12-28

## 2022-01-10 RX ORDER — LISDEXAMFETAMINE DIMESYLATE 50 MG/1
CAPSULE ORAL
Qty: 30 CAPSULE | Refills: 0 | Status: SHIPPED | OUTPATIENT
Start: 2022-01-21 | End: 2022-02-10 | Stop reason: SDUPTHER

## 2022-01-10 RX ORDER — TESTOSTERONE CYPIONATE 200 MG/ML
200 INJECTION, SOLUTION INTRAMUSCULAR
COMMUNITY

## 2022-01-10 NOTE — PROGRESS NOTES
SUBJECTIVE:    Patient ID: Bharath Parr is a 51 y.o. male.    Chief Complaint: Follow-up and ADHD    HPI  Bharath Parr is a 51 y.o. M patient with MHx significant for Dilated Cardiomyopathy (EF 45%, followed by Dr Taylor), HTN, HLD, Hypothyroid, GERD, Obesity, ADHD, SONYA, Fibromyalgia (Dr Almendarez), and Chronic pain (managed by Dr Frazier). He comes in today to follow up on ADHD.    He is prescribed Vyvanse (for years). Last fill was on 21 per  report. Fills once a month with no red flags. Is also on chronic opioids. Denies changes in appetite or sleep. No palpitations or skipped beats.    He also reports that he will be starting testosterone (prescribed by Dr Multani - Endocrinologist) - next appt 22    Needs refill for Epi pen. Has not needed to use, but about to .    Office Visit on 09/15/2021   Component Date Value Ref Range Status    TSH 2021 0.90  0.40 - 4.50 mIU/L Final    Cholesterol 2021 220* <200 mg/dL Final    HDL 2021 44  > OR = 40 mg/dL Final    Triglycerides 2021 249* <150 mg/dL Final    LDL Cholesterol 2021 138* mg/dL (calc) Final    HDL/Cholesterol Ratio 2021 5.0* <5.0 (calc) Final    Non HDL Chol. (LDL+VLDL) 2021 176* <130 mg/dL (calc) Final    WBC 2021 6.9  3.8 - 10.8 Thousand/uL Final    RBC 2021 4.76  4.20 - 5.80 Million/uL Final    Hemoglobin 2021 13.7  13.2 - 17.1 g/dL Final    Hematocrit 2021 42.3  38.5 - 50.0 % Final    MCV 2021 88.9  80.0 - 100.0 fL Final    MCH 2021 28.8  27.0 - 33.0 pg Final    MCHC 2021 32.4  32.0 - 36.0 g/dL Final    RDW 2021 12.2  11.0 - 15.0 % Final    Platelets 2021 392  140 - 400 Thousand/uL Final    MPV 2021 8.8  7.5 - 12.5 fL Final    Neutrophils, Abs 2021 4,457  1,500 - 7,800 cells/uL Final    Lymph # 2021 1,594  850 - 3,900 cells/uL Final    Mono # 2021 449  200 - 950 cells/uL Final    Eos  # 11/05/2021 352  15 - 500 cells/uL Final    Baso # 11/05/2021 48  0 - 200 cells/uL Final    Neutrophils Relative 11/05/2021 64.6  % Final    Lymph % 11/05/2021 23.1  % Final    Mono % 11/05/2021 6.5  % Final    Eosinophil % 11/05/2021 5.1  % Final    Basophil % 11/05/2021 0.7  % Final    Glucose 11/05/2021 85  65 - 99 mg/dL Final    BUN 11/05/2021 10  7 - 25 mg/dL Final    Creatinine 11/05/2021 1.21  0.60 - 1.35 mg/dL Final    eGFR if non African American 11/05/2021 70  > OR = 60 mL/min/1.73m2 Final    eGFR if  11/05/2021 82  > OR = 60 mL/min/1.73m2 Final    BUN/Creatinine Ratio 11/05/2021 NOT APPLICABLE  6 - 22 (calc) Final    Sodium 11/05/2021 137  135 - 146 mmol/L Final    Potassium 11/05/2021 4.4  3.5 - 5.3 mmol/L Final    Chloride 11/05/2021 99  98 - 110 mmol/L Final    CO2 11/05/2021 30  20 - 32 mmol/L Final    Calcium 11/05/2021 9.9  8.6 - 10.3 mg/dL Final    Total Protein 11/05/2021 8.1  6.1 - 8.1 g/dL Final    Albumin 11/05/2021 4.7  3.6 - 5.1 g/dL Final    Globulin, Total 11/05/2021 3.4  1.9 - 3.7 g/dL (calc) Final    Albumin/Globulin Ratio 11/05/2021 1.4  1.0 - 2.5 (calc) Final    Total Bilirubin 11/05/2021 0.4  0.2 - 1.2 mg/dL Final    Alkaline Phosphatase 11/05/2021 92  36 - 130 U/L Final    AST 11/05/2021 14  10 - 40 U/L Final    ALT 11/05/2021 11  9 - 46 U/L Final    HIV Ag/Ab 4th Gen 11/05/2021 NON-REACTIVE  NON-REACTIVE Final    Hepatitis C Ab 11/05/2021 NON-REACTIVE  NON-REACTIVE Final    Signal/Cutoff 11/05/2021 0.01  <1.00 Final   Patient Outreach on 07/27/2021   Component Date Value Ref Range Status    Cholesterol 12/07/2020 228* 0 - 200 mg/dL Final    HDL 12/07/2020 42  mg/dL Final    Triglycerides 12/07/2020 291  mg/dL Final    LDL Cholesterol 12/07/2020 141  mg/dL Final       Past Medical History:   Diagnosis Date    ADD (attention deficit disorder)     Anxiety     Asthma     intermittent    Cardiomyopathy     Idiopathic    CHF (congestive  heart failure)     Hyperlipidemia     Hypertension     Low testosterone     Sleep apnea      Past Surgical History:   Procedure Laterality Date    CARDIAC CATHETERIZATION      left arm surgery      FB removal     Family History   Problem Relation Age of Onset    Cancer Mother         cervical    Heart disease Mother     Hypertension Mother     Stroke Father     Cancer Maternal Grandmother         bladder    Macular degeneration Paternal Grandmother     Cancer Paternal Grandfather         skin    Alzheimer's disease Paternal Grandfather        Tobacco History:  reports that he quit smoking about 30 years ago. He has never used smokeless tobacco.  Alcohol History:  reports current alcohol use.  Drug History:  reports no history of drug use.    Review of patient's allergies indicates:   Allergen Reactions    Doxycycline      Makes my throat swell    Lyrica [pregabalin]      Memory loss    Micardis [telmisartan] Other (See Comments)     cough    Promethazine Other (See Comments)     Makes my skin crawl      Tetracyclines Other (See Comments) and Swelling       Current Outpatient Medications:     acetaminophen (TYLENOL) 500 MG tablet, Take 500 mg by mouth every 6 (six) hours as needed for Pain., Disp: , Rfl:     azelastine-fluticasone (DYMISTA) 137-50 mcg/spray Spry nassal spray, Use 1 spray(s) in each nostril twice daily, Disp: 23 g, Rfl: 5    carvediloL (COREG) 12.5 MG tablet, Take 12.5 mg by mouth 2 (two) times daily with meals. Take 1 and half twice a day, Disp: , Rfl:     cetirizine (ZYRTEC) 10 MG tablet, Take 10 mg by mouth 2 (two) times daily., Disp: , Rfl:     ergocalciferol (ERGOCALCIFEROL) 50,000 unit Cap, Take 50,000 Units by mouth every 7 days., Disp: , Rfl:     ezetimibe (ZETIA) 10 mg tablet, Take 10 mg by mouth once daily., Disp: , Rfl: 5    famotidine (PEPCID) 20 MG tablet, Take 20 mg by mouth 2 (two) times daily., Disp: , Rfl:     irbesartan (AVAPRO) 75 MG tablet, Take 75 mg by  "mouth once daily., Disp: , Rfl:     levothyroxine (SYNTHROID) 75 MCG tablet, Take 75 mcg by mouth once daily., Disp: , Rfl:     meloxicam (MOBIC) 15 MG tablet, Take 1 tablet by mouth once daily, Disp: 30 tablet, Rfl: 2    olopatadine (PATADAY) 0.2 % Drop, INSTILL 1 DROP INTO EACH EYE ONCE DAILY, Disp: 2.5 mL, Rfl: 5    oxyCODONE-acetaminophen (PERCOCET)  mg per tablet, Take 1 tablet by mouth every 4 (four) hours as needed for Pain., Disp: , Rfl:     pantoprazole (PROTONIX) 40 MG tablet, Take 1 tablet by mouth once daily, Disp: 90 tablet, Rfl: 1    potassium chloride SA (K-DUR,KLOR-CON) 20 MEQ tablet, Take 20 mEq by mouth as needed. , Disp: , Rfl:     testosterone cypionate (DEPOTESTOTERONE CYPIONATE) 200 mg/mL injection, Inject 200 mg into the muscle every 14 (fourteen) days. 05mL every 2 weeks, Disp: , Rfl:     EPINEPHrine (EPIPEN) 0.3 mg/0.3 mL AtIn, Inject contents of epi pen at first sign of severe allergic reaction or anaphylaxis., Disp: 2 each, Rfl: 1    orphenadrine (NORFLEX) 100 mg tablet, , Disp: , Rfl: 1    traMADol (ULTRAM) 50 mg tablet, Take 50 mg by mouth every 8 (eight) hours as needed., Disp: , Rfl: 0    [START ON 1/21/2022] VYVANSE 50 mg capsule, TAKE 1 CAPSULE BY MOUTH ONCE DAILY, Disp: 30 capsule, Rfl: 0    Review of Systems  As in HPI           Objective:      Vitals:    01/10/22 1038   BP: 120/70   Pulse: 75   Resp: 18   SpO2: 98%   Weight: 113.4 kg (250 lb)   Height: 5' 10" (1.778 m)     Physical Exam  Constitutional:       General: He is not in acute distress.  Cardiovascular:      Rate and Rhythm: Normal rate and regular rhythm.      Pulses: Normal pulses.      Heart sounds: Normal heart sounds.   Pulmonary:      Effort: Pulmonary effort is normal.      Breath sounds: Normal breath sounds.   Skin:     General: Skin is warm and dry.   Neurological:      General: No focal deficit present.      Mental Status: He is alert and oriented to person, place, and time.   Psychiatric:    "      Mood and Affect: Mood normal.              Assessment:       1. Attention deficit hyperactivity disorder (ADHD), unspecified ADHD type    2. History of severe allergic reaction    3. Need for vaccination             Plan:       Attention deficit hyperactivity disorder (ADHD), unspecified ADHD type - no changes. Refill issued (TBF on or after 1/21/22).    -     VYVANSE 50 mg capsule; TAKE 1 CAPSULE BY MOUTH ONCE DAILY  Dispense: 30 capsule; Refill: 0    History of severe allergic reaction  -     EPINEPHrine (EPIPEN) 0.3 mg/0.3 mL AtIn; Inject contents of epi pen at first sign of severe allergic reaction or anaphylaxis.  Dispense: 2 each; Refill: 1      Follow up in about 1 month (around 2/10/2022) for med refill adhd.        1/10/2022 Josefina Strong M.D.

## 2022-02-09 NOTE — PROGRESS NOTES
SUBJECTIVE:    Patient ID: Bharath Parr is a 51 y.o. male.    Chief Complaint: ADHD and Follow-up    HPI  Bharath Parr is a 51 y.o. M patient with MHx significant for Dilated Cardiomyopathy (EF 45%, followed by Dr Taylor), HTN, HLD, Hypothyroid, GERD, Obesity, ADHD, SONYA, Fibromyalgia (Dr Almendarez), and Chronic pain (managed by Dr Frazier). He comes in today to follow up on ADHD.     He is prescribed Vyvanse (for years). Last fill was on 1/25/22 per  report. Fills once a month with no red flags. Is also on chronic opioids, and now on Testosterone. Denies palpitations or skipped beats.    The patient reports that he has been feeling some apathy over the last 3 weeks or so. He wants to stay in bed, has been isolating, +anhedonia, not sleeping well, +depressed. Denies suicidal ideation or plan. He received a prescription for antidepressant from Pain Management, but has not taken them because he's tried it before and it caused erectile dysfunction. Has never tried Wellbutrin.        Office Visit on 09/15/2021   Component Date Value Ref Range Status    TSH 11/05/2021 0.90  0.40 - 4.50 mIU/L Final    Cholesterol 11/05/2021 220* <200 mg/dL Final    HDL 11/05/2021 44  > OR = 40 mg/dL Final    Triglycerides 11/05/2021 249* <150 mg/dL Final    LDL Cholesterol 11/05/2021 138* mg/dL (calc) Final    HDL/Cholesterol Ratio 11/05/2021 5.0* <5.0 (calc) Final    Non HDL Chol. (LDL+VLDL) 11/05/2021 176* <130 mg/dL (calc) Final    WBC 11/05/2021 6.9  3.8 - 10.8 Thousand/uL Final    RBC 11/05/2021 4.76  4.20 - 5.80 Million/uL Final    Hemoglobin 11/05/2021 13.7  13.2 - 17.1 g/dL Final    Hematocrit 11/05/2021 42.3  38.5 - 50.0 % Final    MCV 11/05/2021 88.9  80.0 - 100.0 fL Final    MCH 11/05/2021 28.8  27.0 - 33.0 pg Final    MCHC 11/05/2021 32.4  32.0 - 36.0 g/dL Final    RDW 11/05/2021 12.2  11.0 - 15.0 % Final    Platelets 11/05/2021 392  140 - 400 Thousand/uL Final    MPV 11/05/2021 8.8  7.5 -  12.5 fL Final    Neutrophils, Abs 11/05/2021 4,457  1,500 - 7,800 cells/uL Final    Lymph # 11/05/2021 1,594  850 - 3,900 cells/uL Final    Mono # 11/05/2021 449  200 - 950 cells/uL Final    Eos # 11/05/2021 352  15 - 500 cells/uL Final    Baso # 11/05/2021 48  0 - 200 cells/uL Final    Neutrophils Relative 11/05/2021 64.6  % Final    Lymph % 11/05/2021 23.1  % Final    Mono % 11/05/2021 6.5  % Final    Eosinophil % 11/05/2021 5.1  % Final    Basophil % 11/05/2021 0.7  % Final    Glucose 11/05/2021 85  65 - 99 mg/dL Final    BUN 11/05/2021 10  7 - 25 mg/dL Final    Creatinine 11/05/2021 1.21  0.60 - 1.35 mg/dL Final    eGFR if non African American 11/05/2021 70  > OR = 60 mL/min/1.73m2 Final    eGFR if  11/05/2021 82  > OR = 60 mL/min/1.73m2 Final    BUN/Creatinine Ratio 11/05/2021 NOT APPLICABLE  6 - 22 (calc) Final    Sodium 11/05/2021 137  135 - 146 mmol/L Final    Potassium 11/05/2021 4.4  3.5 - 5.3 mmol/L Final    Chloride 11/05/2021 99  98 - 110 mmol/L Final    CO2 11/05/2021 30  20 - 32 mmol/L Final    Calcium 11/05/2021 9.9  8.6 - 10.3 mg/dL Final    Total Protein 11/05/2021 8.1  6.1 - 8.1 g/dL Final    Albumin 11/05/2021 4.7  3.6 - 5.1 g/dL Final    Globulin, Total 11/05/2021 3.4  1.9 - 3.7 g/dL (calc) Final    Albumin/Globulin Ratio 11/05/2021 1.4  1.0 - 2.5 (calc) Final    Total Bilirubin 11/05/2021 0.4  0.2 - 1.2 mg/dL Final    Alkaline Phosphatase 11/05/2021 92  36 - 130 U/L Final    AST 11/05/2021 14  10 - 40 U/L Final    ALT 11/05/2021 11  9 - 46 U/L Final    HIV Ag/Ab 4th Gen 11/05/2021 NON-REACTIVE  NON-REACTIVE Final    Hepatitis C Ab 11/05/2021 NON-REACTIVE  NON-REACTIVE Final    Signal/Cutoff 11/05/2021 0.01  <1.00 Final       Past Medical History:   Diagnosis Date    ADD (attention deficit disorder)     Anxiety     Asthma     intermittent    Cardiomyopathy     Idiopathic    CHF (congestive heart failure)     Hyperlipidemia     Hypertension      Low testosterone     Sleep apnea      Past Surgical History:   Procedure Laterality Date    CARDIAC CATHETERIZATION      left arm surgery      FB removal     Family History   Problem Relation Age of Onset    Cancer Mother         cervical    Heart disease Mother     Hypertension Mother     Stroke Father     Cancer Maternal Grandmother         bladder    Macular degeneration Paternal Grandmother     Cancer Paternal Grandfather         skin    Alzheimer's disease Paternal Grandfather        Tobacco History:  reports that he quit smoking about 30 years ago. He has never used smokeless tobacco.  Alcohol History:  reports current alcohol use.  Drug History:  reports no history of drug use.    Review of patient's allergies indicates:   Allergen Reactions    Doxycycline      Makes my throat swell    Lyrica [pregabalin]      Memory loss    Micardis [telmisartan] Other (See Comments)     cough    Promethazine Other (See Comments)     Makes my skin crawl      Tetracyclines Other (See Comments) and Swelling       Current Outpatient Medications:     acetaminophen (TYLENOL) 500 MG tablet, Take 500 mg by mouth every 6 (six) hours as needed for Pain., Disp: , Rfl:     azelastine-fluticasone (DYMISTA) 137-50 mcg/spray Spry nassal spray, Use 1 spray(s) in each nostril twice daily, Disp: 23 g, Rfl: 5    carvediloL (COREG) 12.5 MG tablet, Take 12.5 mg by mouth 2 (two) times daily with meals. Take 1 and half twice a day, Disp: , Rfl:     cetirizine (ZYRTEC) 10 MG tablet, Take 10 mg by mouth 2 (two) times daily., Disp: , Rfl:     EPINEPHrine (EPIPEN) 0.3 mg/0.3 mL AtIn, Inject contents of epi pen at first sign of severe allergic reaction or anaphylaxis., Disp: 2 each, Rfl: 1    ergocalciferol (ERGOCALCIFEROL) 50,000 unit Cap, Take 50,000 Units by mouth every 7 days., Disp: , Rfl:     ezetimibe (ZETIA) 10 mg tablet, Take 10 mg by mouth once daily., Disp: , Rfl: 5    famotidine (PEPCID) 20 MG tablet, Take 20 mg  "by mouth 2 (two) times daily., Disp: , Rfl:     irbesartan (AVAPRO) 75 MG tablet, Take 75 mg by mouth once daily., Disp: , Rfl:     levothyroxine (SYNTHROID) 75 MCG tablet, Take 75 mcg by mouth once daily., Disp: , Rfl:     meloxicam (MOBIC) 15 MG tablet, Take 1 tablet by mouth once daily, Disp: 30 tablet, Rfl: 2    olopatadine (PATADAY) 0.2 % Drop, INSTILL 1 DROP INTO EACH EYE ONCE DAILY, Disp: 2.5 mL, Rfl: 5    orphenadrine (NORFLEX) 100 mg tablet, , Disp: , Rfl: 1    oxyCODONE-acetaminophen (PERCOCET)  mg per tablet, Take 1 tablet by mouth every 4 (four) hours as needed for Pain., Disp: , Rfl:     pantoprazole (PROTONIX) 40 MG tablet, Take 1 tablet by mouth once daily, Disp: 90 tablet, Rfl: 1    potassium chloride SA (K-DUR,KLOR-CON) 20 MEQ tablet, Take 20 mEq by mouth as needed. , Disp: , Rfl:     testosterone cypionate (DEPOTESTOTERONE CYPIONATE) 200 mg/mL injection, Inject 200 mg into the muscle every 14 (fourteen) days. 05mL every 2 weeks, Disp: , Rfl:     traMADol (ULTRAM) 50 mg tablet, Take 50 mg by mouth every 8 (eight) hours as needed., Disp: , Rfl: 0    buPROPion (WELLBUTRIN) 75 MG tablet, Take 1 tablet (75 mg total) by mouth 2 (two) times daily., Disp: 60 tablet, Rfl: 2    VYVANSE 50 mg capsule, TAKE 1 CAPSULE BY MOUTH ONCE DAILY, Disp: 30 capsule, Rfl: 0    Review of Systems  As in HPI           Objective:      Vitals:    02/10/22 1043   BP: 132/80   Pulse: 96   Resp: 18   SpO2: 98%   Weight: 114.3 kg (252 lb)   Height: 5' 10" (1.778 m)     Physical Exam  Vitals reviewed.   Constitutional:       General: He is not in acute distress.  HENT:      Head: Normocephalic and atraumatic.   Cardiovascular:      Rate and Rhythm: Normal rate and regular rhythm.      Pulses: Normal pulses.      Heart sounds: Normal heart sounds.   Pulmonary:      Effort: Pulmonary effort is normal.      Breath sounds: Normal breath sounds.   Skin:     General: Skin is warm and dry.   Neurological:      General: " "No focal deficit present.      Mental Status: He is alert and oriented to person, place, and time.   Psychiatric:      Comments: Well groomed, appropriately dressed. Mood is "so-so" with mildly depressed affect. Good eye contact. Fair judgment and insight. Normal thought process, content, and speech. No delusions elicited. No SI. No HI.              Assessment:       1. Attention deficit hyperactivity disorder (ADHD), unspecified ADHD type    2. Dysthymia             Plan:       Attention deficit hyperactivity disorder (ADHD), unspecified ADHD type - continues to do well on current regimen. Will refill.  -     VYVANSE 50 mg capsule; TAKE 1 CAPSULE BY MOUTH ONCE DAILY  Dispense: 30 capsule; Refill: 0    Dysthymia - reviewed signs and symptoms of dysthymia/depression, which resonate with patient. Amenable to trial of Bupropion. Patient to dial 9-1-1, go to nearest ED, or call Suicide Prevention Hotline if any thoughts or intention to harm self or others should arise.  -     buPROPion (WELLBUTRIN) 75 MG tablet; Take 1 tablet (75 mg total) by mouth 2 (two) times daily.  Dispense: 60 tablet; Refill: 2      Follow up in about 1 month (around 3/10/2022) for med mgmt.        2/10/2022 Josefina Strong M.D.    "

## 2022-02-10 ENCOUNTER — OFFICE VISIT (OUTPATIENT)
Dept: FAMILY MEDICINE | Facility: CLINIC | Age: 52
End: 2022-02-10
Payer: MEDICARE

## 2022-02-10 VITALS
SYSTOLIC BLOOD PRESSURE: 132 MMHG | RESPIRATION RATE: 18 BRPM | WEIGHT: 252 LBS | HEART RATE: 96 BPM | HEIGHT: 70 IN | DIASTOLIC BLOOD PRESSURE: 80 MMHG | BODY MASS INDEX: 36.08 KG/M2 | OXYGEN SATURATION: 98 %

## 2022-02-10 DIAGNOSIS — F34.1 DYSTHYMIA: ICD-10-CM

## 2022-02-10 DIAGNOSIS — F90.9 ATTENTION DEFICIT HYPERACTIVITY DISORDER (ADHD), UNSPECIFIED ADHD TYPE: Primary | ICD-10-CM

## 2022-02-10 PROCEDURE — 99214 OFFICE O/P EST MOD 30 MIN: CPT | Mod: S$PBB,AQ,, | Performed by: FAMILY MEDICINE

## 2022-02-10 PROCEDURE — 99214 PR OFFICE/OUTPT VISIT, EST, LEVL IV, 30-39 MIN: ICD-10-PCS | Mod: S$PBB,AQ,, | Performed by: FAMILY MEDICINE

## 2022-02-10 PROCEDURE — 99215 OFFICE O/P EST HI 40 MIN: CPT | Performed by: FAMILY MEDICINE

## 2022-02-10 RX ORDER — BUPROPION HYDROCHLORIDE 75 MG/1
75 TABLET ORAL 2 TIMES DAILY
Qty: 60 TABLET | Refills: 2 | Status: SHIPPED | OUTPATIENT
Start: 2022-02-10 | End: 2022-03-18

## 2022-02-10 RX ORDER — LISDEXAMFETAMINE DIMESYLATE 50 MG/1
CAPSULE ORAL
Qty: 30 CAPSULE | Refills: 0 | Status: SHIPPED | OUTPATIENT
Start: 2022-02-10 | End: 2022-03-18 | Stop reason: SDUPTHER

## 2022-03-09 ENCOUNTER — PES CALL (OUTPATIENT)
Dept: ADMINISTRATIVE | Facility: CLINIC | Age: 52
End: 2022-03-09
Payer: MEDICARE

## 2022-03-18 ENCOUNTER — OFFICE VISIT (OUTPATIENT)
Dept: FAMILY MEDICINE | Facility: CLINIC | Age: 52
End: 2022-03-18
Payer: MEDICARE

## 2022-03-18 VITALS
DIASTOLIC BLOOD PRESSURE: 78 MMHG | HEART RATE: 88 BPM | RESPIRATION RATE: 18 BRPM | BODY MASS INDEX: 35.93 KG/M2 | OXYGEN SATURATION: 100 % | WEIGHT: 251 LBS | HEIGHT: 70 IN | SYSTOLIC BLOOD PRESSURE: 132 MMHG

## 2022-03-18 DIAGNOSIS — Z82.0 FAMILY HISTORY OF TIAS: ICD-10-CM

## 2022-03-18 DIAGNOSIS — E78.5 HYPERLIPIDEMIA, UNSPECIFIED HYPERLIPIDEMIA TYPE: ICD-10-CM

## 2022-03-18 DIAGNOSIS — I10 UNCONTROLLED HYPERTENSION: ICD-10-CM

## 2022-03-18 DIAGNOSIS — F34.1 DYSTHYMIA: ICD-10-CM

## 2022-03-18 DIAGNOSIS — F90.9 ATTENTION DEFICIT HYPERACTIVITY DISORDER (ADHD), UNSPECIFIED ADHD TYPE: Primary | ICD-10-CM

## 2022-03-18 PROCEDURE — 99215 OFFICE O/P EST HI 40 MIN: CPT | Performed by: FAMILY MEDICINE

## 2022-03-18 PROCEDURE — 99214 PR OFFICE/OUTPT VISIT, EST, LEVL IV, 30-39 MIN: ICD-10-PCS | Mod: S$PBB,AQ,, | Performed by: FAMILY MEDICINE

## 2022-03-18 PROCEDURE — 99214 OFFICE O/P EST MOD 30 MIN: CPT | Mod: S$PBB,AQ,, | Performed by: FAMILY MEDICINE

## 2022-03-18 RX ORDER — LISDEXAMFETAMINE DIMESYLATE 50 MG/1
CAPSULE ORAL
Qty: 30 CAPSULE | Refills: 0 | Status: SHIPPED | OUTPATIENT
Start: 2022-03-18 | End: 2022-04-18 | Stop reason: SDUPTHER

## 2022-03-18 RX ORDER — BUPROPION HYDROCHLORIDE 100 MG/1
100 TABLET, EXTENDED RELEASE ORAL 2 TIMES DAILY
Qty: 60 TABLET | Refills: 1 | Status: SHIPPED | OUTPATIENT
Start: 2022-03-18 | End: 2022-05-17 | Stop reason: SDUPTHER

## 2022-03-18 NOTE — PROGRESS NOTES
"  SUBJECTIVE:    Patient ID: Bharath Parr is a 51 y.o. male.    Chief Complaint: Follow-up and ADHD    HPI  Bharath Parr is a 51 y.o. M patient with MHx significant for Dilated Cardiomyopathy (EF 45%, followed by Dr Taylor), HTN, HLD, Hypothyroid, GERD, Obesity, ADHD, SONYA, Fibromyalgia (Dr Almendarez), and Chronic pain (managed by Dr Frazier). At last OV a month ago, he was having depressive symptoms and was started on Wellbutrin. He is here for follow up.    Reports mood is only slightly improved. Thinks related to pain, inflammation "everywhere in his body" and is hopeful for some answers and relief with upcoming visit w Rheumatology next week.    Patient is also concerned about his cardiovascular health given that his Dad has had a recent TIA and his Mom has a history of multiple TIAs. He is not a smoker. No known diabetes w normal glucose on recent CMP. +Hypertension that is controlled on meds. +Hyperlipidemia on Zetia, not well controlled. Incidentally, he mentions that he is considering switching to a different cardiologist and will alert us if he does so.    No visits with results within 6 Month(s) from this visit.   Latest known visit with results is:   Office Visit on 09/15/2021   Component Date Value Ref Range Status    TSH 11/05/2021 0.90  0.40 - 4.50 mIU/L Final    Cholesterol 11/05/2021 220 (A) <200 mg/dL Final    HDL 11/05/2021 44  > OR = 40 mg/dL Final    Triglycerides 11/05/2021 249 (A) <150 mg/dL Final    LDL Cholesterol 11/05/2021 138 (A) mg/dL (calc) Final    HDL/Cholesterol Ratio 11/05/2021 5.0 (A) <5.0 (calc) Final    Non HDL Chol. (LDL+VLDL) 11/05/2021 176 (A) <130 mg/dL (calc) Final    WBC 11/05/2021 6.9  3.8 - 10.8 Thousand/uL Final    RBC 11/05/2021 4.76  4.20 - 5.80 Million/uL Final    Hemoglobin 11/05/2021 13.7  13.2 - 17.1 g/dL Final    Hematocrit 11/05/2021 42.3  38.5 - 50.0 % Final    MCV 11/05/2021 88.9  80.0 - 100.0 fL Final    MCH 11/05/2021 28.8  27.0 - " 33.0 pg Final    MCHC 11/05/2021 32.4  32.0 - 36.0 g/dL Final    RDW 11/05/2021 12.2  11.0 - 15.0 % Final    Platelets 11/05/2021 392  140 - 400 Thousand/uL Final    MPV 11/05/2021 8.8  7.5 - 12.5 fL Final    Neutrophils, Abs 11/05/2021 4,457  1,500 - 7,800 cells/uL Final    Lymph # 11/05/2021 1,594  850 - 3,900 cells/uL Final    Mono # 11/05/2021 449  200 - 950 cells/uL Final    Eos # 11/05/2021 352  15 - 500 cells/uL Final    Baso # 11/05/2021 48  0 - 200 cells/uL Final    Neutrophils Relative 11/05/2021 64.6  % Final    Lymph % 11/05/2021 23.1  % Final    Mono % 11/05/2021 6.5  % Final    Eosinophil % 11/05/2021 5.1  % Final    Basophil % 11/05/2021 0.7  % Final    Glucose 11/05/2021 85  65 - 99 mg/dL Final    BUN 11/05/2021 10  7 - 25 mg/dL Final    Creatinine 11/05/2021 1.21  0.60 - 1.35 mg/dL Final    eGFR if non African American 11/05/2021 70  > OR = 60 mL/min/1.73m2 Final    eGFR if  11/05/2021 82  > OR = 60 mL/min/1.73m2 Final    BUN/Creatinine Ratio 11/05/2021 NOT APPLICABLE  6 - 22 (calc) Final    Sodium 11/05/2021 137  135 - 146 mmol/L Final    Potassium 11/05/2021 4.4  3.5 - 5.3 mmol/L Final    Chloride 11/05/2021 99  98 - 110 mmol/L Final    CO2 11/05/2021 30  20 - 32 mmol/L Final    Calcium 11/05/2021 9.9  8.6 - 10.3 mg/dL Final    Total Protein 11/05/2021 8.1  6.1 - 8.1 g/dL Final    Albumin 11/05/2021 4.7  3.6 - 5.1 g/dL Final    Globulin, Total 11/05/2021 3.4  1.9 - 3.7 g/dL (calc) Final    Albumin/Globulin Ratio 11/05/2021 1.4  1.0 - 2.5 (calc) Final    Total Bilirubin 11/05/2021 0.4  0.2 - 1.2 mg/dL Final    Alkaline Phosphatase 11/05/2021 92  36 - 130 U/L Final    AST 11/05/2021 14  10 - 40 U/L Final    ALT 11/05/2021 11  9 - 46 U/L Final    HIV Ag/Ab 4th Gen 11/05/2021 NON-REACTIVE  NON-REACTIVE Final    Hepatitis C Ab 11/05/2021 NON-REACTIVE  NON-REACTIVE Final    Signal/Cutoff 11/05/2021 0.01  <1.00 Final       Past Medical History:    Diagnosis Date    ADD (attention deficit disorder)     Anxiety     Asthma     intermittent    Cardiomyopathy     Idiopathic    CHF (congestive heart failure)     Hyperlipidemia     Hypertension     Low testosterone     Sleep apnea      Past Surgical History:   Procedure Laterality Date    CARDIAC CATHETERIZATION      left arm surgery      FB removal     Family History   Problem Relation Age of Onset    Cancer Mother         cervical    Heart disease Mother     Hypertension Mother     Stroke Father     Cancer Maternal Grandmother         bladder    Macular degeneration Paternal Grandmother     Cancer Paternal Grandfather         skin    Alzheimer's disease Paternal Grandfather        Tobacco History:  reports that he quit smoking about 30 years ago. He has never used smokeless tobacco.  Alcohol History:  reports current alcohol use.  Drug History:  reports no history of drug use.    Review of patient's allergies indicates:   Allergen Reactions    Doxycycline      Makes my throat swell    Lyrica [pregabalin]      Memory loss    Micardis [telmisartan] Other (See Comments)     cough    Promethazine Other (See Comments)     Makes my skin crawl      Tetracyclines Other (See Comments) and Swelling       Current Outpatient Medications:     acetaminophen (TYLENOL) 500 MG tablet, Take 500 mg by mouth every 6 (six) hours as needed for Pain., Disp: , Rfl:     azelastine-fluticasone (DYMISTA) 137-50 mcg/spray Spry nassal spray, Use 1 spray(s) in each nostril twice daily, Disp: 23 g, Rfl: 5    carvediloL (COREG) 12.5 MG tablet, Take 12.5 mg by mouth 2 (two) times daily with meals. Take 1 and half twice a day, Disp: , Rfl:     cetirizine (ZYRTEC) 10 MG tablet, Take 10 mg by mouth 2 (two) times daily., Disp: , Rfl:     EPINEPHrine (EPIPEN) 0.3 mg/0.3 mL AtIn, Inject contents of epi pen at first sign of severe allergic reaction or anaphylaxis., Disp: 2 each, Rfl: 1    ergocalciferol (ERGOCALCIFEROL)  "50,000 unit Cap, Take 50,000 Units by mouth every 7 days., Disp: , Rfl:     ezetimibe (ZETIA) 10 mg tablet, Take 10 mg by mouth once daily., Disp: , Rfl: 5    famotidine (PEPCID) 20 MG tablet, Take 20 mg by mouth 2 (two) times daily., Disp: , Rfl:     irbesartan (AVAPRO) 75 MG tablet, Take 75 mg by mouth once daily., Disp: , Rfl:     levothyroxine (SYNTHROID) 75 MCG tablet, Take 75 mcg by mouth once daily., Disp: , Rfl:     meloxicam (MOBIC) 15 MG tablet, Take 1 tablet by mouth once daily, Disp: 30 tablet, Rfl: 2    olopatadine (PATADAY) 0.2 % Drop, INSTILL 1 DROP INTO EACH EYE ONCE DAILY, Disp: 2.5 mL, Rfl: 5    orphenadrine (NORFLEX) 100 mg tablet, , Disp: , Rfl: 1    oxyCODONE-acetaminophen (PERCOCET)  mg per tablet, Take 1 tablet by mouth every 4 (four) hours as needed for Pain., Disp: , Rfl:     pantoprazole (PROTONIX) 40 MG tablet, Take 1 tablet by mouth once daily, Disp: 90 tablet, Rfl: 1    potassium chloride SA (K-DUR,KLOR-CON) 20 MEQ tablet, Take 20 mEq by mouth as needed. , Disp: , Rfl:     testosterone cypionate (DEPOTESTOTERONE CYPIONATE) 200 mg/mL injection, Inject 200 mg into the muscle every 14 (fourteen) days. 05mL every 2 weeks, Disp: , Rfl:     traMADol (ULTRAM) 50 mg tablet, Take 50 mg by mouth every 8 (eight) hours as needed., Disp: , Rfl: 0    buPROPion (WELLBUTRIN SR) 100 MG TBSR 12 hr tablet, Take 1 tablet (100 mg total) by mouth 2 (two) times daily., Disp: 60 tablet, Rfl: 1    VYVANSE 50 mg capsule, TAKE 1 CAPSULE BY MOUTH ONCE DAILY, Disp: 30 capsule, Rfl: 0    Review of Systems  As in HPI           Objective:      Vitals:    03/18/22 0821   BP: 132/78   Pulse: 88   Resp: 18   SpO2: 100%   Weight: 113.9 kg (251 lb)   Height: 5' 10" (1.778 m)     Physical Exam  Vitals reviewed.   Constitutional:       General: He is not in acute distress.     Appearance: He is obese.   Eyes:      Conjunctiva/sclera: Conjunctivae normal.   Pulmonary:      Effort: Pulmonary effort is " normal.   Neurological:      General: No focal deficit present.      Mental Status: He is alert and oriented to person, place, and time.   Psychiatric:      Comments: Well groomed, appropriately dressed.  Mood is mildly depressed with congruent affect.  Normal thought process, content, and speech.  Fair insight and judgment.  No auditory hallucinations, visual hallucinations, suicidal ideation, or homicidal ideation.              Assessment:       1. Attention deficit hyperactivity disorder (ADHD), unspecified ADHD type    2. Dysthymia    3. Uncontrolled hypertension    4. Hyperlipidemia, unspecified hyperlipidemia type    5. Family history of TIAs             Plan:       Attention deficit hyperactivity disorder (ADHD), unspecified ADHD type - stable on current meds for years. Continue.  -     VYVANSE 50 mg capsule; TAKE 1 CAPSULE BY MOUTH ONCE DAILY  Dispense: 30 capsule; Refill: 0    Dysthymia- mildly improved. Will incraese Bupropion to 100mg BIG. Briefly discussed possibility of switching to different antidepressant, but patient concerned about sexual side effects.  -     buPROPion (WELLBUTRIN SR) 100 MG TBSR 12 hr tablet; Take 1 tablet (100 mg total) by mouth 2 (two) times daily.  Dispense: 60 tablet; Refill: 1    Uncontrolled hypertension - well controlled. Continue current regimen per Cardiology.    Hyperlipidemia, unspecified hyperlipidemia type - remains uncontrolled despite use of Zetia. Patient does not tolerate statins.  Recommend consultation with Cardiology for other medication options to lower LDL.  Patient in agreement.    Family history of TIAs - reviewed importance of maintaining good control of blood pressure, cholesterol, and glucose.  Patient is a nonsmoker.      No follow-ups on file.        3/18/2022 Josefina Strong M.D.

## 2022-03-21 ENCOUNTER — LAB VISIT (OUTPATIENT)
Dept: LAB | Facility: HOSPITAL | Age: 52
End: 2022-03-21
Attending: INTERNAL MEDICINE
Payer: MEDICARE

## 2022-03-21 ENCOUNTER — OFFICE VISIT (OUTPATIENT)
Dept: RHEUMATOLOGY | Facility: CLINIC | Age: 52
End: 2022-03-21
Payer: MEDICARE

## 2022-03-21 VITALS
DIASTOLIC BLOOD PRESSURE: 79 MMHG | BODY MASS INDEX: 36.43 KG/M2 | SYSTOLIC BLOOD PRESSURE: 127 MMHG | WEIGHT: 253.88 LBS

## 2022-03-21 DIAGNOSIS — M79.7 FIBROMYALGIA: ICD-10-CM

## 2022-03-21 DIAGNOSIS — Z92.25 PERSONAL HISTORY OF IMMUNOSUPPRESSION THERAPY: ICD-10-CM

## 2022-03-21 DIAGNOSIS — M15.9 OSTEOARTHRITIS, GENERALIZED: ICD-10-CM

## 2022-03-21 DIAGNOSIS — M15.9 OSTEOARTHRITIS, GENERALIZED: Primary | ICD-10-CM

## 2022-03-21 DIAGNOSIS — Z79.899 LONG-TERM USE OF HIGH-RISK MEDICATION: ICD-10-CM

## 2022-03-21 LAB
ALBUMIN SERPL BCP-MCNC: 4.3 G/DL (ref 3.5–5.2)
ALP SERPL-CCNC: 85 U/L (ref 55–135)
ALT SERPL W/O P-5'-P-CCNC: 14 U/L (ref 10–44)
ANION GAP SERPL CALC-SCNC: 7 MMOL/L (ref 8–16)
AST SERPL-CCNC: 16 U/L (ref 10–40)
BACTERIA #/AREA URNS HPF: NEGATIVE /HPF
BASOPHILS # BLD AUTO: 0.04 K/UL (ref 0–0.2)
BASOPHILS NFR BLD: 0.5 % (ref 0–1.9)
BILIRUB SERPL-MCNC: 0.5 MG/DL (ref 0.1–1)
BILIRUB UR QL STRIP: ABNORMAL
BUN SERPL-MCNC: 10 MG/DL (ref 6–20)
CALCIUM SERPL-MCNC: 9.2 MG/DL (ref 8.7–10.5)
CHLORIDE SERPL-SCNC: 102 MMOL/L (ref 95–110)
CK SERPL-CCNC: 65 U/L (ref 20–200)
CLARITY UR: CLEAR
CO2 SERPL-SCNC: 30 MMOL/L (ref 23–29)
COLOR UR: YELLOW
CREAT SERPL-MCNC: 0.9 MG/DL (ref 0.5–1.4)
CRP SERPL-MCNC: 1.38 MG/DL
DIFFERENTIAL METHOD: ABNORMAL
EOSINOPHIL # BLD AUTO: 0.4 K/UL (ref 0–0.5)
EOSINOPHIL NFR BLD: 4.9 % (ref 0–8)
ERYTHROCYTE [DISTWIDTH] IN BLOOD BY AUTOMATED COUNT: 13 % (ref 11.5–14.5)
ERYTHROCYTE [SEDIMENTATION RATE] IN BLOOD BY WESTERGREN METHOD: 13 MM/HR (ref 0–10)
EST. GFR  (AFRICAN AMERICAN): >60 ML/MIN/1.73 M^2
EST. GFR  (NON AFRICAN AMERICAN): >60 ML/MIN/1.73 M^2
GLUCOSE SERPL-MCNC: 107 MG/DL (ref 70–110)
GLUCOSE UR QL STRIP: NEGATIVE
HCT VFR BLD AUTO: 44.1 % (ref 40–54)
HGB BLD-MCNC: 14.1 G/DL (ref 14–18)
HGB UR QL STRIP: NEGATIVE
HYALINE CASTS #/AREA URNS LPF: 6 /LPF
IMM GRANULOCYTES # BLD AUTO: 0.01 K/UL (ref 0–0.04)
IMM GRANULOCYTES NFR BLD AUTO: 0.1 % (ref 0–0.5)
KETONES UR QL STRIP: ABNORMAL
LEUKOCYTE ESTERASE UR QL STRIP: NEGATIVE
LYMPHOCYTES # BLD AUTO: 2.1 K/UL (ref 1–4.8)
LYMPHOCYTES NFR BLD: 24.5 % (ref 18–48)
MCH RBC QN AUTO: 29.3 PG (ref 27–31)
MCHC RBC AUTO-ENTMCNC: 32 G/DL (ref 32–36)
MCV RBC AUTO: 92 FL (ref 82–98)
MICROSCOPIC COMMENT: ABNORMAL
MONOCYTES # BLD AUTO: 0.9 K/UL (ref 0.3–1)
MONOCYTES NFR BLD: 10.3 % (ref 4–15)
NEUTROPHILS # BLD AUTO: 5.2 K/UL (ref 1.8–7.7)
NEUTROPHILS NFR BLD: 59.7 % (ref 38–73)
NITRITE UR QL STRIP: NEGATIVE
NRBC BLD-RTO: 0 /100 WBC
PH UR STRIP: 6 [PH] (ref 5–8)
PLATELET # BLD AUTO: 327 K/UL (ref 150–450)
PMV BLD AUTO: 8.5 FL (ref 9.2–12.9)
POTASSIUM SERPL-SCNC: 4.1 MMOL/L (ref 3.5–5.1)
PROT SERPL-MCNC: 7.7 G/DL (ref 6–8.4)
PROT UR QL STRIP: ABNORMAL
RBC # BLD AUTO: 4.82 M/UL (ref 4.6–6.2)
RBC #/AREA URNS HPF: 2 /HPF (ref 0–4)
SODIUM SERPL-SCNC: 139 MMOL/L (ref 136–145)
SP GR UR STRIP: >1.03 (ref 1–1.03)
SQUAMOUS #/AREA URNS HPF: 1 /HPF
URATE SERPL-MCNC: 6.1 MG/DL (ref 3.4–7)
URN SPEC COLLECT METH UR: ABNORMAL
UROBILINOGEN UR STRIP-ACNC: ABNORMAL EU/DL
WBC # BLD AUTO: 8.62 K/UL (ref 3.9–12.7)
WBC #/AREA URNS HPF: 1 /HPF (ref 0–5)

## 2022-03-21 PROCEDURE — 99213 PR OFFICE/OUTPT VISIT, EST, LEVL III, 20-29 MIN: ICD-10-PCS | Mod: S$GLB,,, | Performed by: INTERNAL MEDICINE

## 2022-03-21 PROCEDURE — 86235 NUCLEAR ANTIGEN ANTIBODY: CPT | Performed by: INTERNAL MEDICINE

## 2022-03-21 PROCEDURE — 85651 RBC SED RATE NONAUTOMATED: CPT | Performed by: INTERNAL MEDICINE

## 2022-03-21 PROCEDURE — 84165 PROTEIN E-PHORESIS SERUM: CPT | Performed by: INTERNAL MEDICINE

## 2022-03-21 PROCEDURE — 86038 ANTINUCLEAR ANTIBODIES: CPT | Performed by: INTERNAL MEDICINE

## 2022-03-21 PROCEDURE — 84550 ASSAY OF BLOOD/URIC ACID: CPT | Performed by: INTERNAL MEDICINE

## 2022-03-21 PROCEDURE — 99213 OFFICE O/P EST LOW 20 MIN: CPT | Mod: S$GLB,,, | Performed by: INTERNAL MEDICINE

## 2022-03-21 PROCEDURE — 86200 CCP ANTIBODY: CPT | Performed by: INTERNAL MEDICINE

## 2022-03-21 PROCEDURE — 81374 HLA I TYPING 1 ANTIGEN LR: CPT | Performed by: INTERNAL MEDICINE

## 2022-03-21 PROCEDURE — 36415 COLL VENOUS BLD VENIPUNCTURE: CPT | Performed by: INTERNAL MEDICINE

## 2022-03-21 PROCEDURE — 83520 IMMUNOASSAY QUANT NOS NONAB: CPT | Mod: 59 | Performed by: INTERNAL MEDICINE

## 2022-03-21 PROCEDURE — 86140 C-REACTIVE PROTEIN: CPT | Performed by: INTERNAL MEDICINE

## 2022-03-21 PROCEDURE — 82550 ASSAY OF CK (CPK): CPT | Performed by: INTERNAL MEDICINE

## 2022-03-21 PROCEDURE — 85025 COMPLETE CBC W/AUTO DIFF WBC: CPT | Performed by: INTERNAL MEDICINE

## 2022-03-21 PROCEDURE — 86431 RHEUMATOID FACTOR QUANT: CPT | Performed by: INTERNAL MEDICINE

## 2022-03-21 PROCEDURE — 81001 URINALYSIS AUTO W/SCOPE: CPT | Performed by: INTERNAL MEDICINE

## 2022-03-21 PROCEDURE — 80053 COMPREHEN METABOLIC PANEL: CPT | Performed by: INTERNAL MEDICINE

## 2022-03-21 RX ORDER — DIFLUNISAL 500 MG/1
TABLET, FILM COATED ORAL
Qty: 90 TABLET | Refills: 0 | Status: SHIPPED | OUTPATIENT
Start: 2022-03-21 | End: 2022-05-16

## 2022-03-21 NOTE — PROGRESS NOTES
[facility] RHEUMATOLOGY            PROGRESS NOTE      Subjective:       Patient ID:   NAME: Bharath Parr : 1970     51 y.o. male    Referring Doc: No ref. provider found  Other Physicians:    Chief Complaint:  Osteoarthritis and Fibromyalgia      History of Present Illness:     Patient returns today for a regularly scheduled follow-up visit for   osteoarthritis and fibromyalgia    The patient was last seen in 2021  Has increased fatigue and arthralgias and myalgias.  No rashes.  No chest pains.  Has fatigue.  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 weight is stable   + fatigue  SKIN: no rashes, no bruising, no ulcerations, no Raynaud's  HEENT: no HA's, No visual changes, no mucosal ulcers, no sicca symptoms,  CV:   no CP, SOB, PND, SORENSEN, no orthopnea, no palpitations  PULM: normal with no SOB, cough, hemoptysis, sputum or pleuritic pain  GI:  no abdominal pain, nausea, vomiting, constipation, diarrhea, melanotic stools, BRBPR, hematemesis, no dysphagia  :   no dysuria  NEURO: no paresthesias, headaches, visual disturbances, muscle weakness  MUSCULOSKELETAL:no joint swelling, prolonged AM stiffness, no back pain, + muscle pain  Allergies:  Review of patient's allergies indicates:   Allergen Reactions    Doxycycline      Makes my throat swell    Lyrica [pregabalin]      Memory loss    Micardis [telmisartan] Other (See Comments)     cough    Promethazine Other (See Comments)     Makes my skin crawl      Tetracyclines Other (See Comments) and Swelling       Medications:    Current Outpatient Medications:     acetaminophen (TYLENOL) 500 MG tablet, Take 500 mg by mouth every 6 (six) hours as needed for Pain., Disp: , Rfl:     azelastine-fluticasone (DYMISTA) 137-50 mcg/spray Spry nassal spray, Use 1 spray(s) in each nostril twice daily, Disp: 23 g, Rfl: 5    buPROPion (WELLBUTRIN SR) 100 MG TBSR 12 hr tablet, Take  1 tablet (100 mg total) by mouth 2 (two) times daily., Disp: 60 tablet, Rfl: 1    carvediloL (COREG) 12.5 MG tablet, Take 12.5 mg by mouth 2 (two) times daily with meals. Take 1 and half twice a day, Disp: , Rfl:     cetirizine (ZYRTEC) 10 MG tablet, Take 10 mg by mouth 2 (two) times daily., Disp: , Rfl:     EPINEPHrine (EPIPEN) 0.3 mg/0.3 mL AtIn, Inject contents of epi pen at first sign of severe allergic reaction or anaphylaxis., Disp: 2 each, Rfl: 1    ergocalciferol (ERGOCALCIFEROL) 50,000 unit Cap, Take 50,000 Units by mouth every 7 days., Disp: , Rfl:     ezetimibe (ZETIA) 10 mg tablet, Take 10 mg by mouth once daily., Disp: , Rfl: 5    famotidine (PEPCID) 20 MG tablet, Take 20 mg by mouth 2 (two) times daily., Disp: , Rfl:     irbesartan (AVAPRO) 75 MG tablet, Take 75 mg by mouth once daily., Disp: , Rfl:     levothyroxine (SYNTHROID) 75 MCG tablet, Take 75 mcg by mouth once daily., Disp: , Rfl:     olopatadine (PATADAY) 0.2 % Drop, INSTILL 1 DROP INTO EACH EYE ONCE DAILY, Disp: 2.5 mL, Rfl: 5    orphenadrine (NORFLEX) 100 mg tablet, , Disp: , Rfl: 1    oxyCODONE-acetaminophen (PERCOCET)  mg per tablet, Take 1 tablet by mouth every 4 (four) hours as needed for Pain., Disp: , Rfl:     pantoprazole (PROTONIX) 40 MG tablet, Take 1 tablet by mouth once daily, Disp: 90 tablet, Rfl: 1    potassium chloride SA (K-DUR,KLOR-CON) 20 MEQ tablet, Take 20 mEq by mouth as needed. , Disp: , Rfl:     testosterone cypionate (DEPOTESTOTERONE CYPIONATE) 200 mg/mL injection, Inject 200 mg into the muscle every 14 (fourteen) days. 05mL every 2 weeks, Disp: , Rfl:     VYVANSE 50 mg capsule, TAKE 1 CAPSULE BY MOUTH ONCE DAILY, Disp: 30 capsule, Rfl: 0    diflunisaL (DOLOBID) 500 mg Tab, 1/2-1 po bid-tid pc prn, Disp: 90 tablet, Rfl: 0    traMADol (ULTRAM) 50 mg tablet, Take 50 mg by mouth every 8 (eight) hours as needed., Disp: , Rfl: 0    PMHx/PSHx Updates:          Objective:     Vitals:  Blood pressure  127/79, weight 115.2 kg (253 lb 14.4 oz).    Physical Examination:   GEN: no apparent distress, comfortable; AAOx3  SKIN: no rashes,no ulceration, no Raynaud's, no petechiae, no SQ nodules,  HEAD: normal  EYES: no pallor, no icterus,  NECK: no masses, thyroid normal, trachea midline, no LAD/LN's, supple  CV: RRR with no murmur; l S1 and S2 reg. ,no gallop no rubs,   CHEST: Normal respiratory effort; CTAB; normal breath sounds; no wheeze or crackles  MUSC/Skeletal: ROM normal; no crepitus; joints without synovitis,  no deformities  No joint swelling or tenderness of PIP, MCP, wrist, elbow, shoulder, or knee joints  EXTREM: no clubbing, cyanosis, no edema,normal  pulses   NEURO: grossly intact; motor WNL; AAOx3;  PSYCH: normal mood, affect and behavior  LYMPH: normal cervical, supraclavicular          Labs:   Lab Results   Component Value Date    WBC 8.62 03/21/2022    HGB 14.1 03/21/2022    HCT 44.1 03/21/2022    MCV 92 03/21/2022     03/21/2022    CMP  @LASTLAB(NA,K,CL,CO2,GLU,BUN,Creatinine,Calcium,PROT,Albumin,Bilitot,Alkphos,AST,ALT,CRP,ESR,RF,CCP,JANET,SSA,CPK,uric acid) )@  I have reviewed all available lab results and radiology reports.    Radiology/Diagnostic Studies:        Assessment/Plan:   (1) 51 y.o. male with diagnosis of history of fibromyalgia and osteoarthritis.  Rheumatologic workup in the past has been negative with negative JANET SSA negative rheumatoid factor CCP negative Anca etcetera.  Has a history of slight increase inflammatory markers of unclear etiology.  History of cardiomyopathy        Labs  Can stop meloxicam on other nonsteroidals.  Can try diflunisal 250-500 mg b.i.d. to t.i.d. p.c. p.r.n.  Follow-up in a few weeks  Discussion:     I have explained all of the above in detail and the patient understands all of the current recommendation(s). I have answered all questions to the best of my ability and to their complete satisfaction.       The patient is to continue with the current  management plan         RTC in   a few weeks      Electronically signed by Juanjo Almendarez MD

## 2022-03-22 LAB
ANA TITR SER IF: NEGATIVE {TITER}
ENA SS-A AB SER-ACNC: <0.2 AI (ref 0–0.9)
RHEUMATOID FACT SERPL-ACNC: 45.1 IU/ML

## 2022-03-23 LAB
ALBUMIN SERPL ELPH-MCNC: 3.7 G/DL (ref 2.9–4.4)
ALBUMIN/GLOB SERPL: 1 {RATIO} (ref 0.7–1.7)
ALPHA1 GLOB SERPL ELPH-MCNC: 0.3 G/DL (ref 0–0.4)
ALPHA2 GLOB SERPL ELPH-MCNC: 1 G/DL (ref 0.4–1)
B-GLOBULIN SERPL ELPH-MCNC: 1.2 G/DL (ref 0.7–1.3)
C-ANCA TITR SER IF: NORMAL TITER
CCP IGA+IGG SERPL IA-ACNC: 7 UNITS (ref 0–19)
GAMMA GLOB SERPL ELPH-MCNC: 1.2 G/DL (ref 0.4–1.8)
GLOBULIN SER CALC-MCNC: 3.7 G/DL (ref 2.2–3.9)
LABORATORY COMMENT REPORT: NORMAL
M PROTEIN SERPL ELPH-MCNC: NORMAL G/DL
MYELOPEROXIDASE AB SER IA-ACNC: <9 U/ML (ref 0–9)
P-ANCA ATYPICAL TITR SER IF: NORMAL TITER
P-ANCA TITR SER IF: NORMAL TITER
PROT SERPL-MCNC: 7.4 G/DL (ref 6–8.5)
PROTEINASE3 AB SER IA-ACNC: <3.5 U/ML (ref 0–3.5)

## 2022-03-28 LAB — HLA-B27 QL NAA+PROBE: NEGATIVE

## 2022-04-12 ENCOUNTER — HOSPITAL ENCOUNTER (OUTPATIENT)
Dept: RADIOLOGY | Facility: HOSPITAL | Age: 52
Discharge: HOME | End: 2022-04-12
Attending: INTERNAL MEDICINE
Payer: MEDICARE

## 2022-04-12 ENCOUNTER — OFFICE VISIT (OUTPATIENT)
Dept: RHEUMATOLOGY | Facility: CLINIC | Age: 52
End: 2022-04-12
Payer: MEDICARE

## 2022-04-12 VITALS
WEIGHT: 253.31 LBS | SYSTOLIC BLOOD PRESSURE: 138 MMHG | BODY MASS INDEX: 36.34 KG/M2 | DIASTOLIC BLOOD PRESSURE: 88 MMHG

## 2022-04-12 DIAGNOSIS — R76.8 RHEUMATOID FACTOR POSITIVE: ICD-10-CM

## 2022-04-12 DIAGNOSIS — R76.8 RHEUMATOID FACTOR POSITIVE: Primary | ICD-10-CM

## 2022-04-12 PROCEDURE — 71046 X-RAY EXAM CHEST 2 VIEWS: CPT | Mod: TC

## 2022-04-12 PROCEDURE — 73620 X-RAY EXAM OF FOOT: CPT | Mod: TC,50

## 2022-04-12 PROCEDURE — 73130 X-RAY EXAM OF HAND: CPT | Mod: TC,50

## 2022-04-12 PROCEDURE — 99213 OFFICE O/P EST LOW 20 MIN: CPT | Mod: S$GLB,,, | Performed by: INTERNAL MEDICINE

## 2022-04-12 PROCEDURE — 99213 PR OFFICE/OUTPT VISIT, EST, LEVL III, 20-29 MIN: ICD-10-PCS | Mod: S$GLB,,, | Performed by: INTERNAL MEDICINE

## 2022-04-12 RX ORDER — METHYLPREDNISOLONE 4 MG/1
TABLET ORAL
Qty: 21 EACH | Refills: 0 | Status: SHIPPED | OUTPATIENT
Start: 2022-04-12 | End: 2022-05-03

## 2022-04-12 RX ORDER — HYDROXYCHLOROQUINE SULFATE 200 MG/1
200 TABLET, FILM COATED ORAL DAILY
Qty: 60 TABLET | Refills: 1 | Status: SHIPPED | OUTPATIENT
Start: 2022-04-12 | End: 2022-04-28

## 2022-04-12 NOTE — PROGRESS NOTES
[facility] RHEUMATOLOGY            PROGRESS NOTE      Subjective:       Patient ID:   NAME: Bharath Parr : 1970     51 y.o. male    Referring Doc: No ref. provider found  Other Physicians:    Chief Complaint:  Osteoarthritis      History of Present Illness:     Patient returns today for a regularly scheduled follow-up visit for arthralgias      The patient continues with shoulder pain, fatigue.  No prolonged morning stiffness.  Arthralgias in some finger joints and toes.  No chest pains or cough.  No fevers; no shortness of breath.          ROS:   GEN:  No  fever, night sweats . weight is stable   + fatigue  SKIN: no rashes, no bruising, no ulcerations, no Raynaud's  HEENT: no HA's, No visual changes, no mucosal ulcers, no sicca symptoms,  CV:   no CP, SOB, PND, SORENSEN, no orthopnea, no palpitations  PULM: normal with no SOB, cough, hemoptysis, sputum or pleuritic pain  GI:  no abdominal pain, nausea, vomiting, constipation, diarrhea, melanotic stools, BRBPR, hematemesis, no dysphagia  :   no dysuria  NEURO: no paresthesias, headaches, visual disturbances, muscle weakness  MUSCULOSKELETAL:no joint swelling, prolonged AM stiffness, +,occ back pain, no muscle pain  Allergies:  Review of patient's allergies indicates:   Allergen Reactions    Doxycycline      Makes my throat swell    Lyrica [pregabalin]      Memory loss    Micardis [telmisartan] Other (See Comments)     cough    Promethazine Other (See Comments)     Makes my skin crawl      Tetracyclines Other (See Comments) and Swelling       Medications:    Current Outpatient Medications:     acetaminophen (TYLENOL) 500 MG tablet, Take 500 mg by mouth every 6 (six) hours as needed for Pain., Disp: , Rfl:     azelastine-fluticasone (DYMISTA) 137-50 mcg/spray Spry nassal spray, Use 1 spray(s) in each nostril twice daily, Disp: 23 g, Rfl: 5    buPROPion (WELLBUTRIN SR) 100 MG TBSR 12 hr tablet, Take 1 tablet (100 mg total) by mouth 2 (two) times  daily., Disp: 60 tablet, Rfl: 1    carvediloL (COREG) 12.5 MG tablet, Take 12.5 mg by mouth 2 (two) times daily with meals. Take 1 and half twice a day, Disp: , Rfl:     cetirizine (ZYRTEC) 10 MG tablet, Take 10 mg by mouth 2 (two) times daily., Disp: , Rfl:     diflunisaL (DOLOBID) 500 mg Tab, 1/2-1 po bid-tid pc prn, Disp: 90 tablet, Rfl: 0    EPINEPHrine (EPIPEN) 0.3 mg/0.3 mL AtIn, Inject contents of epi pen at first sign of severe allergic reaction or anaphylaxis., Disp: 2 each, Rfl: 1    ergocalciferol (ERGOCALCIFEROL) 50,000 unit Cap, Take 50,000 Units by mouth every 7 days., Disp: , Rfl:     ezetimibe (ZETIA) 10 mg tablet, Take 10 mg by mouth once daily., Disp: , Rfl: 5    famotidine (PEPCID) 20 MG tablet, Take 20 mg by mouth 2 (two) times daily., Disp: , Rfl:     irbesartan (AVAPRO) 75 MG tablet, Take 75 mg by mouth once daily., Disp: , Rfl:     levothyroxine (SYNTHROID) 75 MCG tablet, Take 75 mcg by mouth once daily., Disp: , Rfl:     olopatadine (PATADAY) 0.2 % Drop, INSTILL 1 DROP INTO EACH EYE ONCE DAILY, Disp: 2.5 mL, Rfl: 5    orphenadrine (NORFLEX) 100 mg tablet, , Disp: , Rfl: 1    oxyCODONE-acetaminophen (PERCOCET)  mg per tablet, Take 1 tablet by mouth every 4 (four) hours as needed for Pain., Disp: , Rfl:     pantoprazole (PROTONIX) 40 MG tablet, Take 1 tablet by mouth once daily, Disp: 90 tablet, Rfl: 1    potassium chloride SA (K-DUR,KLOR-CON) 20 MEQ tablet, Take 20 mEq by mouth as needed. , Disp: , Rfl:     testosterone cypionate (DEPOTESTOTERONE CYPIONATE) 200 mg/mL injection, Inject 200 mg into the muscle every 14 (fourteen) days. 05mL every 2 weeks, Disp: , Rfl:     traMADol (ULTRAM) 50 mg tablet, Take 50 mg by mouth every 8 (eight) hours as needed., Disp: , Rfl: 0    VYVANSE 50 mg capsule, TAKE 1 CAPSULE BY MOUTH ONCE DAILY, Disp: 30 capsule, Rfl: 0    hydrOXYchloroQUINE (PLAQUENIL) 200 mg tablet, Take 1 tablet (200 mg total) by mouth once daily., Disp: 60 tablet,  Rfl: 1    methylPREDNISolone (MEDROL DOSEPACK) 4 mg tablet, use as directed, Disp: 21 each, Rfl: 0    PMHx/PSHx Updates:        Objective:     Vitals:  Blood pressure 138/88, weight 114.9 kg (253 lb 4.8 oz).    Physical Examination:   GEN: no apparent distress, comfortable; AAOx3  SKIN: no rashes,no ulceration, no Raynaud's, no petechiae, no SQ nodules,  HEAD: normal  EYES: no pallor, no icterus  NECK: no masses, thyroid normal, trachea midline, no LAD/LN's, supple  CV: RRR with no murmur; l S1 and S2 reg. ,no gallop no rubs,   CHEST: Normal respiratory effort; CTAB; normal breath sounds; no wheeze or crackles  MUSC/Skeletal: no crepitus; joints without synovitis,  no deformities  No joint swelling or tenderness of PIP,, wrist, elbow, shoulder, or  Lknee joints.  Tenderness in some MTP joints and MCP joints.  No swelling noted  EXTREM: no clubbing, cyanosis, no edema  NEURO: grossly intact; motor WNL; AAOx3; ,  PSYCH: normal mood, affect and behavior  LYMPH: normal cervical, supraclavicular          Labs:   Lab Results   Component Value Date    WBC 8.62 03/21/2022    HGB 14.1 03/21/2022    HCT 44.1 03/21/2022    MCV 92 03/21/2022     03/21/2022    CMP  @LASTLAB(NA,K,CL,CO2,GLU,BUN,Creatinine,Calcium,PROT,Albumin,Bilitot,Alkphos,AST,ALT,CRP,ESR,RF,CCP,JANET,SSA,CPK,uric acid) )@  I have reviewed all available lab results and radiology reports.    Radiology/Diagnostic Studies:        Assessment/Plan:   (1) 51 y.o. male with diagnosis of early RA.  Pos RF, slight elevation of sedimentation rate and C-reactive protein.  Negative CCP antibody, negative JANET and SSA.  Negative HLA B27.    Plaquenil 200 mg twice a day  Labs  X-rays of hands and feet, chest    Follow-up in 3 weeks    Discussion:     I have explained all of the above in detail and the patient understands all of the current recommendation(s). I have answered all questions to the best of my ability and to their complete satisfaction.       The patient is  to continue with the current management plan         RTC in         Electronically signed by Juanjo Almendarez MD

## 2022-04-17 NOTE — PROGRESS NOTES
SUBJECTIVE:    Patient ID: Bharath Parr is a 51 y.o. male.    Chief Complaint: Follow-up, ADHD, and Depression    HPI  Bharath Parr is a 51 y.o. M patient with MHx significant for Dilated Cardiomyopathy (EF 45%, followed by Dr Taylor), HTN, HLD, Hypothyroid, GERD, Obesity, ADHD, SONYA, Fibromyalgia (Dr Almendarez), and Chronic pain (managed by Dr Frazier). At last OV a month ago, his dysthymic symptoms were only mildly improved and his Wellbutrin was increased to 100mg BID. He also reported that his body pain and inflammation were contributing to his poor mood and he was hopeful to get some answers from Rheumatology. He is here for follow up.    Since his last OV, he has seen Dr Almendarez twice. He was initially started on Diflunisal and taken off NSAIDs, and subsequently was found to have positive RF, started on Plaquenil and given Medrol dose pack.    Today, he reports he is just starting to feel back to normal after taking Prednisone for 6 days (body, joints not as painful). Thinks Bupropion is helping, but feels that picture is murky because of his comorbid pain. Would like to remain on current dose as his pain is worked on and improved via Rheum follow up. Appetite is fine. He sleeps a lot on average, but has been staying in bed more than usual as of late. +Anhedonia, but able to make himself do more tasks now than last week. No SI or HI.    ADHD still well controlled on same dose Vyvanse.    BP slightly above goal. Denies CP or SOB. No ROSAMARAI.    Will see Dr Almendarez in another 3 weeks for follow up.      Lab Visit on 04/12/2022   Component Date Value Ref Range Status    Angio Convert Enzyme 04/12/2022 51  14 - 82 U/L Final    Hepatitis B Surface Ag 04/12/2022 Negative  Negative Final    RBC 04/12/2022 4.97  4.14 - 5.80 x10E6/uL Final    G6PD Quant 04/12/2022 261  127 - 427 U/10E12 RBC Final    QuantiFERON Criteria 04/12/2022 Comment   Final    QuantiFERON TB1 Ag Value 04/12/2022 0.01  IU/mL  Final    QuantiFERON TB2 Ag Value 04/12/2022 0.04  IU/mL Final    Quantiferon Mitogen Value 04/12/2022 >10.00  IU/mL Final    Quantiferon Nil Value 04/12/2022 0.01  IU/mL Final    QuantiFERON-TB Gold Plus 04/12/2022 Negative  Negative Final   Lab Visit on 03/21/2022   Component Date Value Ref Range Status    WBC 03/21/2022 8.62  3.90 - 12.70 K/uL Final    RBC 03/21/2022 4.82  4.60 - 6.20 M/uL Final    Hemoglobin 03/21/2022 14.1  14.0 - 18.0 g/dL Final    Hematocrit 03/21/2022 44.1  40.0 - 54.0 % Final    MCV 03/21/2022 92  82 - 98 fL Final    MCH 03/21/2022 29.3  27.0 - 31.0 pg Final    MCHC 03/21/2022 32.0  32.0 - 36.0 g/dL Final    RDW 03/21/2022 13.0  11.5 - 14.5 % Final    Platelets 03/21/2022 327  150 - 450 K/uL Final    MPV 03/21/2022 8.5 (A) 9.2 - 12.9 fL Final    Immature Granulocytes 03/21/2022 0.1  0.0 - 0.5 % Final    Gran # (ANC) 03/21/2022 5.2  1.8 - 7.7 K/uL Final    Immature Grans (Abs) 03/21/2022 0.01  0.00 - 0.04 K/uL Final    Lymph # 03/21/2022 2.1  1.0 - 4.8 K/uL Final    Mono # 03/21/2022 0.9  0.3 - 1.0 K/uL Final    Eos # 03/21/2022 0.4  0.0 - 0.5 K/uL Final    Baso # 03/21/2022 0.04  0.00 - 0.20 K/uL Final    nRBC 03/21/2022 0  0 /100 WBC Final    Gran % 03/21/2022 59.7  38.0 - 73.0 % Final    Lymph % 03/21/2022 24.5  18.0 - 48.0 % Final    Mono % 03/21/2022 10.3  4.0 - 15.0 % Final    Eosinophil % 03/21/2022 4.9  0.0 - 8.0 % Final    Basophil % 03/21/2022 0.5  0.0 - 1.9 % Final    Differential Method 03/21/2022 Automated   Final    Sodium 03/21/2022 139  136 - 145 mmol/L Final    Potassium 03/21/2022 4.1  3.5 - 5.1 mmol/L Final    Chloride 03/21/2022 102  95 - 110 mmol/L Final    CO2 03/21/2022 30 (A) 23 - 29 mmol/L Final    Glucose 03/21/2022 107  70 - 110 mg/dL Final    BUN 03/21/2022 10  6 - 20 mg/dL Final    Creatinine 03/21/2022 0.9  0.5 - 1.4 mg/dL Final    Calcium 03/21/2022 9.2  8.7 - 10.5 mg/dL Final    Total Protein 03/21/2022 7.7  6.0 - 8.4 g/dL  Final    Albumin 03/21/2022 4.3  3.5 - 5.2 g/dL Final    Total Bilirubin 03/21/2022 0.5  0.1 - 1.0 mg/dL Final    Alkaline Phosphatase 03/21/2022 85  55 - 135 U/L Final    AST 03/21/2022 16  10 - 40 U/L Final    ALT 03/21/2022 14  10 - 44 U/L Final    Anion Gap 03/21/2022 7 (A) 8 - 16 mmol/L Final    eGFR if African American 03/21/2022 >60.0  >60 mL/min/1.73 m^2 Final    eGFR if non African American 03/21/2022 >60.0  >60 mL/min/1.73 m^2 Final    Sed Rate 03/21/2022 13 (A) 0 - 10 mm/Hr Final    CRP 03/21/2022 1.38 (A) <0.76 mg/dL Final    JANET 03/21/2022 Negative   Final    Anti-SSA Antibody 03/21/2022 <0.2  0.0 - 0.9 AI Final    HLA B27 03/21/2022 Negative   Final    Myeloperoxidase Ab 03/21/2022 <9.0  0.0 - 9.0 U/mL Final    Proteinase 3 Antibody 03/21/2022 <3.5  0.0 - 3.5 U/mL Final    Cytoplasmic Neutrophilic Ab 03/21/2022 <1:20  Neg:<1:20 titer Final    Perinuclear (P-ANCA) 03/21/2022 <1:20  Neg:<1:20 titer Final    Atypical pANCA 03/21/2022 <1:20  Neg:<1:20 titer Final    Total Protein 03/21/2022 7.4  6.0 - 8.5 g/dL Final    Albumin 03/21/2022 3.7  2.9 - 4.4 g/dL Final    Alpha-1 03/21/2022 0.3  0.0 - 0.4 g/dL Final    Alpha-2 03/21/2022 1.0  0.4 - 1.0 g/dL Final    Beta 03/21/2022 1.2  0.7 - 1.3 g/dL Final    Gamma 03/21/2022 1.2  0.4 - 1.8 g/dL Final    M-SPIKE, PROT ELECTRO 03/21/2022 Not Observed  Not Observed g/dL Final    Globulin, Total 03/21/2022 3.7  2.2 - 3.9 g/dL Final    A/G Ratio 03/21/2022 1.0  0.7 - 1.7 Final    Please Note: 03/21/2022 Comment   Final    Rheumatoid Factor 03/21/2022 45.1 (A) <14.0 IU/mL Final    CCP Antibodies 03/21/2022 7  0 - 19 units Final    CPK 03/21/2022 65  20 - 200 U/L Final    Uric Acid 03/21/2022 6.1  3.4 - 7.0 mg/dL Final    Specimen UA 03/21/2022 Urine, Clean Catch   Final    Color, UA 03/21/2022 Yellow  Yellow, Straw, Alma Final    Appearance, UA 03/21/2022 Clear  Clear Final    pH, UA 03/21/2022 6.0  5.0 - 8.0 Final    Specific  Gravity, UA 03/21/2022 >1.030 (A) 1.005 - 1.030 Final    Protein, UA 03/21/2022 1+ (A) Negative Final    Glucose, UA 03/21/2022 Negative  Negative Final    Ketones, UA 03/21/2022 1+ (A) Negative Final    Bilirubin (UA) 03/21/2022 1+ (A) Negative Final    Occult Blood UA 03/21/2022 Negative  Negative Final    Nitrite, UA 03/21/2022 Negative  Negative Final    Urobilinogen, UA 03/21/2022 2.0-3.0 (A) Negative EU/dL Final    Leukocytes, UA 03/21/2022 Negative  Negative Final    RBC, UA 03/21/2022 2  0 - 4 /hpf Final    WBC, UA 03/21/2022 1  0 - 5 /hpf Final    Bacteria 03/21/2022 Negative  None-Occ /hpf Final    Squam Epithel, UA 03/21/2022 1  /hpf Final    Hyaline Casts, UA 03/21/2022 6 (A) 0-1/lpf /lpf Final    Microscopic Comment 03/21/2022 SEE COMMENT   Final       Past Medical History:   Diagnosis Date    ADD (attention deficit disorder)     Anxiety     Asthma     intermittent    Cardiomyopathy     Idiopathic    CHF (congestive heart failure)     Hyperlipidemia     Hypertension     Low testosterone     Sleep apnea      Past Surgical History:   Procedure Laterality Date    CARDIAC CATHETERIZATION      left arm surgery      FB removal     Family History   Problem Relation Age of Onset    Cancer Mother         cervical    Heart disease Mother     Hypertension Mother     Stroke Father     Cancer Maternal Grandmother         bladder    Macular degeneration Paternal Grandmother     Cancer Paternal Grandfather         skin    Alzheimer's disease Paternal Grandfather        Tobacco History:  reports that he quit smoking about 30 years ago. He has never used smokeless tobacco.  Alcohol History:  reports current alcohol use.  Drug History:  reports no history of drug use.    Review of patient's allergies indicates:   Allergen Reactions    Doxycycline      Makes my throat swell    Lyrica [pregabalin]      Memory loss    Micardis [telmisartan] Other (See Comments)     cough    Promethazine  Other (See Comments)     Makes my skin crawl      Tetracyclines Other (See Comments) and Swelling       Current Outpatient Medications:     acetaminophen (TYLENOL) 500 MG tablet, Take 500 mg by mouth every 6 (six) hours as needed for Pain., Disp: , Rfl:     azelastine-fluticasone (DYMISTA) 137-50 mcg/spray Spry nassal spray, Use 1 spray(s) in each nostril twice daily, Disp: 23 g, Rfl: 5    buPROPion (WELLBUTRIN SR) 100 MG TBSR 12 hr tablet, Take 1 tablet (100 mg total) by mouth 2 (two) times daily., Disp: 60 tablet, Rfl: 1    carvediloL (COREG) 12.5 MG tablet, Take 12.5 mg by mouth 2 (two) times daily with meals. Take 1 and half twice a day, Disp: , Rfl:     cetirizine (ZYRTEC) 10 MG tablet, Take 10 mg by mouth 2 (two) times daily., Disp: , Rfl:     diflunisaL (DOLOBID) 500 mg Tab, 1/2-1 po bid-tid pc prn, Disp: 90 tablet, Rfl: 0    EPINEPHrine (EPIPEN) 0.3 mg/0.3 mL AtIn, Inject contents of epi pen at first sign of severe allergic reaction or anaphylaxis., Disp: 2 each, Rfl: 1    ergocalciferol (ERGOCALCIFEROL) 50,000 unit Cap, Take 50,000 Units by mouth every 7 days., Disp: , Rfl:     ezetimibe (ZETIA) 10 mg tablet, Take 10 mg by mouth once daily., Disp: , Rfl: 5    famotidine (PEPCID) 20 MG tablet, Take 20 mg by mouth 2 (two) times daily., Disp: , Rfl:     hydrOXYchloroQUINE (PLAQUENIL) 200 mg tablet, Take 1 tablet (200 mg total) by mouth once daily., Disp: 60 tablet, Rfl: 1    irbesartan (AVAPRO) 75 MG tablet, Take 75 mg by mouth once daily., Disp: , Rfl:     levothyroxine (SYNTHROID) 75 MCG tablet, Take 75 mcg by mouth once daily., Disp: , Rfl:     methylPREDNISolone (MEDROL DOSEPACK) 4 mg tablet, use as directed, Disp: 21 each, Rfl: 0    olopatadine (PATADAY) 0.2 % Drop, INSTILL 1 DROP INTO EACH EYE ONCE DAILY, Disp: 2.5 mL, Rfl: 5    orphenadrine (NORFLEX) 100 mg tablet, , Disp: , Rfl: 1    oxyCODONE-acetaminophen (PERCOCET)  mg per tablet, Take 1 tablet by mouth every 4 (four) hours  "as needed for Pain., Disp: , Rfl:     pantoprazole (PROTONIX) 40 MG tablet, Take 1 tablet by mouth once daily, Disp: 90 tablet, Rfl: 1    potassium chloride SA (K-DUR,KLOR-CON) 20 MEQ tablet, Take 20 mEq by mouth as needed. , Disp: , Rfl:     testosterone cypionate (DEPOTESTOTERONE CYPIONATE) 200 mg/mL injection, Inject 200 mg into the muscle every 14 (fourteen) days. 05mL every 2 weeks, Disp: , Rfl:     traMADol (ULTRAM) 50 mg tablet, Take 50 mg by mouth every 8 (eight) hours as needed., Disp: , Rfl: 0    VYVANSE 50 mg capsule, TAKE 1 CAPSULE BY MOUTH ONCE DAILY, Disp: 30 capsule, Rfl: 0    Review of Systems  As in HPI           Objective:      Vitals:    04/18/22 0819   BP: 136/80   Pulse: 96   Resp: 18   SpO2: 100%   Weight: 113.9 kg (251 lb)   Height: 5' 10" (1.778 m)     Physical Exam  Vitals reviewed.   Constitutional:       General: He is not in acute distress.  Cardiovascular:      Rate and Rhythm: Normal rate and regular rhythm.      Pulses: Normal pulses.      Heart sounds: Normal heart sounds.   Pulmonary:      Effort: Pulmonary effort is normal.      Breath sounds: Normal breath sounds.   Musculoskeletal:      Right lower leg: No edema.      Left lower leg: No edema.   Skin:     General: Skin is warm and dry.   Neurological:      General: No focal deficit present.      Mental Status: He is alert and oriented to person, place, and time.   Psychiatric:      Comments: Well groomed, casually dressed. Mood is mildly depressed with congruent affect. Good eye contact. Normal thought process, content and speech. Good insight. No SI or HI.              Assessment:       1. Dysthymia    2. Hypertension, unspecified type    3. Attention deficit hyperactivity disorder (ADHD), unspecified ADHD type    4. Other chronic pain             Plan:       Dysthymia - somewhat improving. Continue current dose Bupropion while working on chronic pain. Will follow up in 4-6 weeks. Introduced idea of Counseling. He is not " opposed but not ready today.    Hypertension, unspecified type - discussed ideal should be under 130/80, and he tends to hover just over that. Patient still contemplating a change in cardiologist. Agrees that if next OV still mildly elevated, will tweak antihypertensive regimen.    Attention deficit hyperactivity disorder (ADHD), unspecified ADHD type  -     VYVANSE 50 mg capsule; TAKE 1 CAPSULE BY MOUTH ONCE DAILY  Dispense: 30 capsule; Refill: 0    Other chronic pain - follow up with Dr Almendarez as scheduled.      No follow-ups on file.        4/18/2022 Josefina Strong M.D.

## 2022-04-18 ENCOUNTER — OFFICE VISIT (OUTPATIENT)
Dept: FAMILY MEDICINE | Facility: CLINIC | Age: 52
End: 2022-04-18
Payer: MEDICARE

## 2022-04-18 VITALS
DIASTOLIC BLOOD PRESSURE: 80 MMHG | HEIGHT: 70 IN | SYSTOLIC BLOOD PRESSURE: 136 MMHG | RESPIRATION RATE: 18 BRPM | HEART RATE: 96 BPM | WEIGHT: 251 LBS | OXYGEN SATURATION: 100 % | BODY MASS INDEX: 35.93 KG/M2

## 2022-04-18 DIAGNOSIS — I10 HYPERTENSION, UNSPECIFIED TYPE: ICD-10-CM

## 2022-04-18 DIAGNOSIS — F90.9 ATTENTION DEFICIT HYPERACTIVITY DISORDER (ADHD), UNSPECIFIED ADHD TYPE: ICD-10-CM

## 2022-04-18 DIAGNOSIS — F34.1 DYSTHYMIA: Primary | ICD-10-CM

## 2022-04-18 DIAGNOSIS — G89.29 OTHER CHRONIC PAIN: ICD-10-CM

## 2022-04-18 PROCEDURE — 99213 PR OFFICE/OUTPT VISIT, EST, LEVL III, 20-29 MIN: ICD-10-PCS | Mod: S$PBB,AQ,, | Performed by: FAMILY MEDICINE

## 2022-04-18 PROCEDURE — 99215 OFFICE O/P EST HI 40 MIN: CPT | Performed by: FAMILY MEDICINE

## 2022-04-18 PROCEDURE — 99213 OFFICE O/P EST LOW 20 MIN: CPT | Mod: S$PBB,AQ,, | Performed by: FAMILY MEDICINE

## 2022-04-18 RX ORDER — LISDEXAMFETAMINE DIMESYLATE 50 MG/1
CAPSULE ORAL
Qty: 30 CAPSULE | Refills: 0 | Status: SHIPPED | OUTPATIENT
Start: 2022-04-18 | End: 2022-05-17 | Stop reason: SDUPTHER

## 2022-04-28 ENCOUNTER — OFFICE VISIT (OUTPATIENT)
Dept: RHEUMATOLOGY | Facility: CLINIC | Age: 52
End: 2022-04-28
Payer: MEDICARE

## 2022-04-28 VITALS
HEART RATE: 86 BPM | BODY MASS INDEX: 36.73 KG/M2 | SYSTOLIC BLOOD PRESSURE: 128 MMHG | WEIGHT: 256 LBS | DIASTOLIC BLOOD PRESSURE: 82 MMHG

## 2022-04-28 DIAGNOSIS — M06.9 RHEUMATOID ARTHRITIS, INVOLVING UNSPECIFIED SITE, UNSPECIFIED WHETHER RHEUMATOID FACTOR PRESENT: Primary | ICD-10-CM

## 2022-04-28 PROCEDURE — 99213 PR OFFICE/OUTPT VISIT, EST, LEVL III, 20-29 MIN: ICD-10-PCS | Mod: S$GLB,,, | Performed by: INTERNAL MEDICINE

## 2022-04-28 PROCEDURE — 99213 OFFICE O/P EST LOW 20 MIN: CPT | Mod: S$GLB,,, | Performed by: INTERNAL MEDICINE

## 2022-04-28 RX ORDER — HYDROXYCHLOROQUINE SULFATE 200 MG/1
200 TABLET, FILM COATED ORAL DAILY
Qty: 180 TABLET | Refills: 1 | Status: SHIPPED | OUTPATIENT
Start: 2022-04-28 | End: 2022-11-01 | Stop reason: SDUPTHER

## 2022-04-28 NOTE — PROGRESS NOTES
[facility]            PROGRESS NOTE      Subjective:       Patient ID:   NAME: Bharath Parr : 1970     51 y.o. male    Referring Doc: No ref. provider found  Other Physicians:    Chief Complaint:  Follow-up      History of Present Illness:     Patient returns today for a regularly scheduled follow-up visit for    seropositive rheumatoid arthritis   The patient is tolerating Plaquenil well.  Feels less arthralgias.  Slight fatigue.  No chest pains cough shortness of breath.  No GI complaints.            ROS:   GEN:  No  fever, night sweats . weight is stable   + fatigue  SKIN: no rashes, no bruising, no ulcerations, no Raynaud's  HEENT: no HA's, No visual changes, no mucosal ulcers, no sicca symptoms,  CV:   no CP, SOB, PND, SORENSEN, no orthopnea, no palpitations  PULM: normal with no SOB, cough, hemoptysis, sputum or pleuritic pain  GI:  no abdominal pain, nausea, vomiting, constipation, diarrhea, melanotic stools, BRBPR, hematemesis, no dysphagia  NEURO: no paresthesias, headaches, visual disturbances, muscle weakness  MUSCULOSKELETAL:no joint swelling, prolonged AM stiffness, no back pain, no muscle pain  Allergies:  Review of patient's allergies indicates:   Allergen Reactions    Doxycycline      Makes my throat swell    Lyrica [pregabalin]      Memory loss    Micardis [telmisartan] Other (See Comments)     cough    Promethazine Other (See Comments)     Makes my skin crawl      Tetracyclines Other (See Comments) and Swelling       Medications:    Current Outpatient Medications:     acetaminophen (TYLENOL) 500 MG tablet, Take 500 mg by mouth every 6 (six) hours as needed for Pain., Disp: , Rfl:     azelastine-fluticasone (DYMISTA) 137-50 mcg/spray Spry nassal spray, Use 1 spray(s) in each nostril twice daily, Disp: 23 g, Rfl: 5    buPROPion (WELLBUTRIN SR) 100 MG TBSR 12 hr tablet, Take 1 tablet (100 mg total) by mouth 2 (two) times daily., Disp: 60 tablet, Rfl: 1    carvediloL  (COREG) 12.5 MG tablet, Take 12.5 mg by mouth 2 (two) times daily with meals. Take 1 and half twice a day, Disp: , Rfl:     cetirizine (ZYRTEC) 10 MG tablet, Take 10 mg by mouth 2 (two) times daily., Disp: , Rfl:     diflunisaL (DOLOBID) 500 mg Tab, 1/2-1 po bid-tid pc prn, Disp: 90 tablet, Rfl: 0    EPINEPHrine (EPIPEN) 0.3 mg/0.3 mL AtIn, Inject contents of epi pen at first sign of severe allergic reaction or anaphylaxis., Disp: 2 each, Rfl: 1    ergocalciferol (ERGOCALCIFEROL) 50,000 unit Cap, Take 50,000 Units by mouth every 7 days., Disp: , Rfl:     ezetimibe (ZETIA) 10 mg tablet, Take 10 mg by mouth once daily., Disp: , Rfl: 5    famotidine (PEPCID) 20 MG tablet, Take 20 mg by mouth 2 (two) times daily., Disp: , Rfl:     irbesartan (AVAPRO) 75 MG tablet, Take 75 mg by mouth once daily., Disp: , Rfl:     levothyroxine (SYNTHROID) 75 MCG tablet, Take 75 mcg by mouth once daily., Disp: , Rfl:     methylPREDNISolone (MEDROL DOSEPACK) 4 mg tablet, use as directed, Disp: 21 each, Rfl: 0    olopatadine (PATADAY) 0.2 % Drop, INSTILL 1 DROP INTO EACH EYE ONCE DAILY, Disp: 2.5 mL, Rfl: 5    orphenadrine (NORFLEX) 100 mg tablet, , Disp: , Rfl: 1    oxyCODONE-acetaminophen (PERCOCET)  mg per tablet, Take 1 tablet by mouth every 4 (four) hours as needed for Pain., Disp: , Rfl:     pantoprazole (PROTONIX) 40 MG tablet, Take 1 tablet by mouth once daily, Disp: 90 tablet, Rfl: 1    potassium chloride SA (K-DUR,KLOR-CON) 20 MEQ tablet, Take 20 mEq by mouth as needed. , Disp: , Rfl:     testosterone cypionate (DEPOTESTOTERONE CYPIONATE) 200 mg/mL injection, Inject 200 mg into the muscle every 14 (fourteen) days. 05mL every 2 weeks, Disp: , Rfl:     traMADol (ULTRAM) 50 mg tablet, Take 50 mg by mouth every 8 (eight) hours as needed., Disp: , Rfl: 0    VYVANSE 50 mg capsule, TAKE 1 CAPSULE BY MOUTH ONCE DAILY, Disp: 30 capsule, Rfl: 0    hydrOXYchloroQUINE (PLAQUENIL) 200 mg tablet, Take 1 tablet (200 mg  total) by mouth once daily., Disp: 180 tablet, Rfl: 1    PMHx/PSHx Updates:          Objective:     Vitals:  Blood pressure 128/82, pulse 86, weight 116.1 kg (256 lb).    Physical Examination:   GEN: no apparent distress, comfortable; AAOx3  SKIN: no rashes,no ulceration, no Raynaud's, no petechiae, no SQ nodules,  HEAD: normal  EYES: no pallor, no icterus  NECK: no masses, thyroid normal, trachea midline, no LAD/LN's, supple  CV: RRR with no murmur; l S1 and S2 reg. ,no gallop no rubs,   CHEST: Normal respiratory effort; CTAB; normal breath sounds; no wheeze or crackles  MUSC/Skeletal: ROM normal; no crepitus; joints without synovitis,  no deformities  No joint swelling or tenderness of PIP, MCP, wrist, elbow, shoulder, or knee joints  EXTREM: no clubbing, cyanosis, no edema,normal  pulses   NEURO: grossly intact; motor WNL; AAOx3; ,  PSYCH: normal mood, affect and behavior  LYMPH: normal cervical, supraclavicular          Labs:   Lab Results   Component Value Date    WBC 8.62 03/21/2022    HGB 14.1 03/21/2022    HCT 44.1 03/21/2022    MCV 92 03/21/2022     03/21/2022    CMP  @LAST[facility](NA,K,CL,CO2,GLU,BUN,Creatinine,Calcium,PROT,Albumin,Bilitot,Alkphos,AST,ALT,CRP,ESR,RF,CCP,JANET,SSA,CPK,uric acid) )@  I have reviewed all available lab results and radiology reports.    Radiology/Diagnostic Studies:        Assessment/Plan:   (1) 51 y.o. male with diagnosis of rheumatoid arthritis.  Tolerating Plaquenil.    X-rays of hands and feet without erosive changes            Discussion:     I have explained all of the above in detail and the patient understands all of the current recommendation(s). I have answered all questions to the best of my ability and to their complete satisfaction.       The patient is to continue with the current management plan         RTC in   3 months or before if needed      Electronically signed by Juanjo Almendarez MD

## 2022-05-17 ENCOUNTER — OFFICE VISIT (OUTPATIENT)
Dept: FAMILY MEDICINE | Facility: CLINIC | Age: 52
End: 2022-05-17
Payer: MEDICARE

## 2022-05-17 VITALS
HEART RATE: 98 BPM | BODY MASS INDEX: 36.03 KG/M2 | HEIGHT: 70 IN | OXYGEN SATURATION: 99 % | DIASTOLIC BLOOD PRESSURE: 80 MMHG | TEMPERATURE: 99 F | SYSTOLIC BLOOD PRESSURE: 124 MMHG | WEIGHT: 251.69 LBS

## 2022-05-17 DIAGNOSIS — I10 PRIMARY HYPERTENSION: ICD-10-CM

## 2022-05-17 DIAGNOSIS — H81.10 BENIGN PAROXYSMAL POSITIONAL VERTIGO, UNSPECIFIED LATERALITY: ICD-10-CM

## 2022-05-17 DIAGNOSIS — F34.1 DYSTHYMIA: Primary | ICD-10-CM

## 2022-05-17 DIAGNOSIS — F90.9 ATTENTION DEFICIT HYPERACTIVITY DISORDER (ADHD), UNSPECIFIED ADHD TYPE: ICD-10-CM

## 2022-05-17 DIAGNOSIS — E78.5 HYPERLIPIDEMIA, UNSPECIFIED HYPERLIPIDEMIA TYPE: ICD-10-CM

## 2022-05-17 PROCEDURE — 99214 OFFICE O/P EST MOD 30 MIN: CPT | Mod: S$PBB,AQ,, | Performed by: FAMILY MEDICINE

## 2022-05-17 PROCEDURE — 99214 PR OFFICE/OUTPT VISIT, EST, LEVL IV, 30-39 MIN: ICD-10-PCS | Mod: S$PBB,AQ,, | Performed by: FAMILY MEDICINE

## 2022-05-17 PROCEDURE — 99215 OFFICE O/P EST HI 40 MIN: CPT | Performed by: FAMILY MEDICINE

## 2022-05-17 RX ORDER — CARVEDILOL 25 MG/1
25 TABLET ORAL 2 TIMES DAILY
COMMUNITY
Start: 2022-05-12

## 2022-05-17 RX ORDER — BUPROPION HYDROCHLORIDE 150 MG/1
150 TABLET, EXTENDED RELEASE ORAL 2 TIMES DAILY
Qty: 180 TABLET | Refills: 1 | Status: SHIPPED | OUTPATIENT
Start: 2022-05-17 | End: 2022-06-16 | Stop reason: SDUPTHER

## 2022-05-17 RX ORDER — LISDEXAMFETAMINE DIMESYLATE 50 MG/1
CAPSULE ORAL
Qty: 30 CAPSULE | Refills: 0 | Status: SHIPPED | OUTPATIENT
Start: 2022-05-17 | End: 2022-06-16 | Stop reason: SDUPTHER

## 2022-05-17 RX ORDER — PRAVASTATIN SODIUM 20 MG/1
20 TABLET ORAL DAILY
COMMUNITY
Start: 2022-05-12

## 2022-05-17 NOTE — PROGRESS NOTES
SUBJECTIVE:    Patient ID: Bharath Parr is a 51 y.o. male.    Chief Complaint: Follow-up (ADHD/BP)    HPI  Bharath Parr is a 51 y.o. male with a hx of Dilated Cardiomyopathy (EF 45%, followed by Dr Taylor), HTN, HLD, Hypothyroid, GERD, Obesity, ADHD, SONYA, Fibromyalgia (Dr Almendarez), and Chronic pain (managed by Dr Frazier). He comes in for scheduled follow up on ADHD, Dysthymia, and HTN.    At last OV a month ago, his depression symptoms were still breaking through, but he thought picture was murky because of his chronic pain. At that time, he was starting new meds via Rheumatology. Today, reports that pain is better but mood still not at goal. He would like to try increasing the dose of Bupropion. He is still not opposed to counseling, but does not feel ready at this time.     Regarding his ADHD, it continues to be well controlled on current regimen.    BP is improved today at 124/80 (had been hovering at 130s/80 during previous visits). Denies chest pain, SOB, ROSAMARIA. Recently saw Cardiology (Dr Taylor), who started him on Pravachol. The patient worries about necrotizing myopathy since his mom struggles with it, and reports that they will be monitoring his CPK.    The patient also c/o getting dizzy lately. Happens intermittently, sometimes when getting out of bed. +Spinning when head turns. Happening about twice per week, lasting a few minutes each time. No nausea. NO ear pain or fullness.    He is also concerned about excessive sweating. Agrees to bring it up with his endocrinologist at next visit.        Lab Visit on 04/12/2022   Component Date Value Ref Range Status    Cryoglobulin 04/12/2022 Comment  None detected Final    Angio Convert Enzyme 04/12/2022 51  14 - 82 U/L Final    Hepatitis B Surface Ag 04/12/2022 Negative  Negative Final    RBC 04/12/2022 4.97  4.14 - 5.80 x10E6/uL Final    G6PD Quant 04/12/2022 261  127 - 427 U/10E12 RBC Final    QuantiFERON Criteria 04/12/2022  Comment   Final    QuantiFERON TB1 Ag Value 04/12/2022 0.01  IU/mL Final    QuantiFERON TB2 Ag Value 04/12/2022 0.04  IU/mL Final    Quantiferon Mitogen Value 04/12/2022 >10.00  IU/mL Final    Quantiferon Nil Value 04/12/2022 0.01  IU/mL Final    QuantiFERON-TB Gold Plus 04/12/2022 Negative  Negative Final   Lab Visit on 03/21/2022   Component Date Value Ref Range Status    WBC 03/21/2022 8.62  3.90 - 12.70 K/uL Final    RBC 03/21/2022 4.82  4.60 - 6.20 M/uL Final    Hemoglobin 03/21/2022 14.1  14.0 - 18.0 g/dL Final    Hematocrit 03/21/2022 44.1  40.0 - 54.0 % Final    MCV 03/21/2022 92  82 - 98 fL Final    MCH 03/21/2022 29.3  27.0 - 31.0 pg Final    MCHC 03/21/2022 32.0  32.0 - 36.0 g/dL Final    RDW 03/21/2022 13.0  11.5 - 14.5 % Final    Platelets 03/21/2022 327  150 - 450 K/uL Final    MPV 03/21/2022 8.5 (A) 9.2 - 12.9 fL Final    Immature Granulocytes 03/21/2022 0.1  0.0 - 0.5 % Final    Gran # (ANC) 03/21/2022 5.2  1.8 - 7.7 K/uL Final    Immature Grans (Abs) 03/21/2022 0.01  0.00 - 0.04 K/uL Final    Lymph # 03/21/2022 2.1  1.0 - 4.8 K/uL Final    Mono # 03/21/2022 0.9  0.3 - 1.0 K/uL Final    Eos # 03/21/2022 0.4  0.0 - 0.5 K/uL Final    Baso # 03/21/2022 0.04  0.00 - 0.20 K/uL Final    nRBC 03/21/2022 0  0 /100 WBC Final    Gran % 03/21/2022 59.7  38.0 - 73.0 % Final    Lymph % 03/21/2022 24.5  18.0 - 48.0 % Final    Mono % 03/21/2022 10.3  4.0 - 15.0 % Final    Eosinophil % 03/21/2022 4.9  0.0 - 8.0 % Final    Basophil % 03/21/2022 0.5  0.0 - 1.9 % Final    Differential Method 03/21/2022 Automated   Final    Sodium 03/21/2022 139  136 - 145 mmol/L Final    Potassium 03/21/2022 4.1  3.5 - 5.1 mmol/L Final    Chloride 03/21/2022 102  95 - 110 mmol/L Final    CO2 03/21/2022 30 (A) 23 - 29 mmol/L Final    Glucose 03/21/2022 107  70 - 110 mg/dL Final    BUN 03/21/2022 10  6 - 20 mg/dL Final    Creatinine 03/21/2022 0.9  0.5 - 1.4 mg/dL Final    Calcium 03/21/2022 9.2  8.7  - 10.5 mg/dL Final    Total Protein 03/21/2022 7.7  6.0 - 8.4 g/dL Final    Albumin 03/21/2022 4.3  3.5 - 5.2 g/dL Final    Total Bilirubin 03/21/2022 0.5  0.1 - 1.0 mg/dL Final    Alkaline Phosphatase 03/21/2022 85  55 - 135 U/L Final    AST 03/21/2022 16  10 - 40 U/L Final    ALT 03/21/2022 14  10 - 44 U/L Final    Anion Gap 03/21/2022 7 (A) 8 - 16 mmol/L Final    eGFR if African American 03/21/2022 >60.0  >60 mL/min/1.73 m^2 Final    eGFR if non African American 03/21/2022 >60.0  >60 mL/min/1.73 m^2 Final    Sed Rate 03/21/2022 13 (A) 0 - 10 mm/Hr Final    CRP 03/21/2022 1.38 (A) <0.76 mg/dL Final    JANET 03/21/2022 Negative   Final    Anti-SSA Antibody 03/21/2022 <0.2  0.0 - 0.9 AI Final    HLA B27 03/21/2022 Negative   Final    Myeloperoxidase Ab 03/21/2022 <9.0  0.0 - 9.0 U/mL Final    Proteinase 3 Antibody 03/21/2022 <3.5  0.0 - 3.5 U/mL Final    Cytoplasmic Neutrophilic Ab 03/21/2022 <1:20  Neg:<1:20 titer Final    Perinuclear (P-ANCA) 03/21/2022 <1:20  Neg:<1:20 titer Final    Atypical pANCA 03/21/2022 <1:20  Neg:<1:20 titer Final    Total Protein 03/21/2022 7.4  6.0 - 8.5 g/dL Final    Albumin 03/21/2022 3.7  2.9 - 4.4 g/dL Final    Alpha-1 03/21/2022 0.3  0.0 - 0.4 g/dL Final    Alpha-2 03/21/2022 1.0  0.4 - 1.0 g/dL Final    Beta 03/21/2022 1.2  0.7 - 1.3 g/dL Final    Gamma 03/21/2022 1.2  0.4 - 1.8 g/dL Final    M-SPIKE, PROT ELECTRO 03/21/2022 Not Observed  Not Observed g/dL Final    Globulin, Total 03/21/2022 3.7  2.2 - 3.9 g/dL Final    A/G Ratio 03/21/2022 1.0  0.7 - 1.7 Final    Please Note: 03/21/2022 Comment   Final    Rheumatoid Factor 03/21/2022 45.1 (A) <14.0 IU/mL Final    CCP Antibodies 03/21/2022 7  0 - 19 units Final    CPK 03/21/2022 65  20 - 200 U/L Final    Uric Acid 03/21/2022 6.1  3.4 - 7.0 mg/dL Final    Specimen UA 03/21/2022 Urine, Clean Catch   Final    Color, UA 03/21/2022 Yellow  Yellow, Straw, [place] Final    Appearance, UA 03/21/2022 Clear   Clear Final    pH, UA 03/21/2022 6.0  5.0 - 8.0 Final    Specific [place], UA 03/21/2022 >1.030 (A) 1.005 - 1.030 Final    Protein, UA 03/21/2022 1+ (A) Negative Final    Glucose, UA 03/21/2022 Negative  Negative Final    Ketones, UA 03/21/2022 1+ (A) Negative Final    Bilirubin (UA) 03/21/2022 1+ (A) Negative Final    Occult Blood UA 03/21/2022 Negative  Negative Final    Nitrite, UA 03/21/2022 Negative  Negative Final    Urobilinogen, UA 03/21/2022 2.0-3.0 (A) Negative EU/dL Final    Leukocytes, UA 03/21/2022 Negative  Negative Final    RBC, UA 03/21/2022 2  0 - 4 /hpf Final    WBC, UA 03/21/2022 1  0 - 5 /hpf Final    Bacteria 03/21/2022 Negative  None-Occ /hpf Final    Squam Epithel, UA 03/21/2022 1  /hpf Final    Hyaline Casts, UA 03/21/2022 6 (A) 0-1/lpf /lpf Final    Microscopic Comment 03/21/2022 SEE COMMENT   Final       Past Medical History:   Diagnosis Date    ADD (attention deficit disorder)     Anxiety     Asthma     intermittent    Cardiomyopathy     Idiopathic    CHF (congestive heart failure)     Hyperlipidemia     Hypertension     Low testosterone     Sleep apnea      Past Surgical History:   Procedure Laterality Date    CARDIAC CATHETERIZATION      left arm surgery      FB removal     Family History   Problem Relation Age of Onset    Cancer Mother         cervical    Heart disease Mother     Hypertension Mother     Stroke Father     Cancer Maternal Grandmother         bladder    Macular degeneration Paternal Grandmother     Cancer Paternal Grandfather         skin    Alzheimer's disease Paternal Grandfather        Tobacco History:  reports that he has never smoked. He has never used smokeless tobacco.  Alcohol History:  reports current alcohol use.  Drug History:  reports no history of drug use.    Review of patient's allergies indicates:   Allergen Reactions    Doxycycline      Makes my throat swell    Lyrica [pregabalin]      Memory loss    Micardis  [telmisartan] Other (See Comments)     cough    Promethazine Other (See Comments)     Makes my skin crawl      Tetracyclines Other (See Comments) and Swelling       Current Outpatient Medications:     acetaminophen (TYLENOL) 500 MG tablet, Take 500 mg by mouth every 6 (six) hours as needed for Pain., Disp: , Rfl:     azelastine-fluticasone (DYMISTA) 137-50 mcg/spray Spry nassal spray, Use 1 spray(s) in each nostril twice daily, Disp: 23 g, Rfl: 5    carvediloL (COREG) 25 MG tablet, Take 25 mg by mouth 2 (two) times daily., Disp: , Rfl:     cetirizine (ZYRTEC) 10 MG tablet, Take 10 mg by mouth 2 (two) times daily., Disp: , Rfl:     diflunisaL (DOLOBID) 500 mg Tab, TAKE 1/2 TO 1 (ONE-HALF TO ONE) TABLET BY MOUTH 2 TO 3 TIMES DAILY AFTER A MEAL AS NEEDED, Disp: 90 tablet, Rfl: 0    EPINEPHrine (EPIPEN) 0.3 mg/0.3 mL AtIn, Inject contents of epi pen at first sign of severe allergic reaction or anaphylaxis., Disp: 2 each, Rfl: 1    ergocalciferol (ERGOCALCIFEROL) 50,000 unit Cap, Take 50,000 Units by mouth every 7 days., Disp: , Rfl:     famotidine (PEPCID) 20 MG tablet, Take 20 mg by mouth 2 (two) times daily., Disp: , Rfl:     hydrOXYchloroQUINE (PLAQUENIL) 200 mg tablet, Take 1 tablet (200 mg total) by mouth once daily., Disp: 180 tablet, Rfl: 1    irbesartan (AVAPRO) 75 MG tablet, Take 75 mg by mouth once daily., Disp: , Rfl:     levothyroxine (SYNTHROID) 75 MCG tablet, Take 75 mcg by mouth once daily., Disp: , Rfl:     olopatadine (PATADAY) 0.2 % Drop, INSTILL 1 DROP INTO EACH EYE ONCE DAILY, Disp: 2.5 mL, Rfl: 5    oxyCODONE-acetaminophen (PERCOCET)  mg per tablet, Take 1 tablet by mouth every 4 (four) hours as needed for Pain., Disp: , Rfl:     pantoprazole (PROTONIX) 40 MG tablet, Take 1 tablet by mouth once daily, Disp: 90 tablet, Rfl: 1    potassium chloride SA (K-DUR,KLOR-CON) 20 MEQ tablet, Take 20 mEq by mouth as needed. , Disp: , Rfl:     pravastatin (PRAVACHOL) 20 MG tablet, Take 20  "mg by mouth once daily., Disp: , Rfl:     testosterone cypionate (DEPOTESTOTERONE CYPIONATE) 200 mg/mL injection, Inject 200 mg into the muscle every 14 (fourteen) days. 05mL every 2 weeks, Disp: , Rfl:     buPROPion (WELLBUTRIN SR) 150 MG TBSR 12 hr tablet, Take 1 tablet (150 mg total) by mouth 2 (two) times daily., Disp: 180 tablet, Rfl: 1    ezetimibe (ZETIA) 10 mg tablet, Take 10 mg by mouth once daily., Disp: , Rfl: 5    VYVANSE 50 mg capsule, TAKE 1 CAPSULE BY MOUTH ONCE DAILY, Disp: 30 capsule, Rfl: 0    Review of Systems  As in HPI           Objective:      Vitals:    05/17/22 0827   BP: 124/80   Pulse: 98   Temp: 98.5 °F (36.9 °C)   TempSrc: Oral   SpO2: 99%   Weight: 114.2 kg (251 lb 11.2 oz)   Height: 5' 10" (1.778 m)     Physical Exam  Vitals reviewed.   Constitutional:       General: He is not in acute distress.  Cardiovascular:      Rate and Rhythm: Normal rate and regular rhythm.      Pulses: Normal pulses.      Heart sounds: Normal heart sounds.   Pulmonary:      Effort: Pulmonary effort is normal.      Breath sounds: Normal breath sounds.   Musculoskeletal:      Right lower leg: No edema.      Left lower leg: No edema.   Skin:     General: Skin is warm and dry.   Neurological:      General: No focal deficit present.      Mental Status: He is alert and oriented to person, place, and time.   Psychiatric:      Comments: Well groomed, appropriately dressed.  Mood is mildly depressed with congruent affect.  Good eye contact.  Normal thought process, content, and speech.  Fair to good insight and judgment.  No delusions.  No SI.  No HI.             Assessment:       1. Dysthymia    2. Attention deficit hyperactivity disorder (ADHD), unspecified ADHD type    3. Primary hypertension    4. Hyperlipidemia, unspecified hyperlipidemia type    5. Benign paroxysmal positional vertigo, unspecified laterality             Plan:       Dysthymia - will increase Bupropion to 150mg BID.  -     buPROPion (WELLBUTRIN " SR) 150 MG TBSR 12 hr tablet; Take 1 tablet (150 mg total) by mouth 2 (two) times daily.  Dispense: 180 tablet; Refill: 1    Attention deficit hyperactivity disorder (ADHD), unspecified ADHD type - stable on current regimen. Refill Vyvanse.  -     VYVANSE 50 mg capsule; TAKE 1 CAPSULE BY MOUTH ONCE DAILY  Dispense: 30 capsule; Refill: 0    Primary hypertension - improved. No changes in regimen today.    Hyperlipidemia, unspecified hyperlipidemia type - anticipate improvement now that on Zetia plus statin.    Benign paroxysmal positional vertigo, unspecified laterality - by history. Did not do any diagnostic maneuvers in office as these can induce vertiginous sxs and patient is driving himself. Printed vestibular exercises to do at home.       No follow-ups on file.        5/17/2022 Josefina Strong M.D.

## 2022-05-19 ENCOUNTER — TELEPHONE (OUTPATIENT)
Dept: RHEUMATOLOGY | Facility: CLINIC | Age: 52
End: 2022-05-19

## 2022-05-19 NOTE — TELEPHONE ENCOUNTER
Addended by: Rosario Plata on: 8/28/2019 04:54 PM     Modules accepted: Orders Pt reports he has stopped plaquenil for about a week now.   He did it due to a rash on both legs ,2 inch round spots.   He stated he  will continue to hold it to see if rash gets better.

## 2022-05-19 NOTE — TELEPHONE ENCOUNTER
Pt advised it is unlikely but not impossible for the rash to be from Plaquenil. He should continue to hold it as he planned. Keep us updated.  If rash does not get better he should see his PCP for evaluation.    Pt verbalized understanding of all.

## 2022-06-10 ENCOUNTER — TELEPHONE (OUTPATIENT)
Dept: RHEUMATOLOGY | Facility: CLINIC | Age: 52
End: 2022-06-10
Payer: MEDICARE

## 2022-06-10 NOTE — TELEPHONE ENCOUNTER
----- Message from Julio Sheppard sent at 6/10/2022  1:35 PM CDT -----  Type:  Sooner Appointment Request    Caller is requesting a sooner appointment.  Caller declined first available appointment listed below.  Caller will not accept being placed on the waitlist and is requesting a message be sent to doctor.    Name of Caller:  Patient  When is the first available appointment? New Patient  Symptoms:  RA, fibromyalgia   Best Call Back Number:  [phone]  Additional Information: Pt of Dr. Almendarez

## 2022-06-16 ENCOUNTER — OFFICE VISIT (OUTPATIENT)
Dept: FAMILY MEDICINE | Facility: CLINIC | Age: 52
End: 2022-06-16
Payer: MEDICARE

## 2022-06-16 VITALS
HEART RATE: 86 BPM | DIASTOLIC BLOOD PRESSURE: 78 MMHG | OXYGEN SATURATION: 98 % | SYSTOLIC BLOOD PRESSURE: 138 MMHG | WEIGHT: 251 LBS | HEIGHT: 70 IN | TEMPERATURE: 98 F | BODY MASS INDEX: 35.93 KG/M2

## 2022-06-16 DIAGNOSIS — F34.1 DYSTHYMIA: ICD-10-CM

## 2022-06-16 DIAGNOSIS — F90.9 ATTENTION DEFICIT HYPERACTIVITY DISORDER (ADHD), UNSPECIFIED ADHD TYPE: ICD-10-CM

## 2022-06-16 PROCEDURE — 99215 OFFICE O/P EST HI 40 MIN: CPT | Performed by: FAMILY MEDICINE

## 2022-06-16 PROCEDURE — 99213 PR OFFICE/OUTPT VISIT, EST, LEVL III, 20-29 MIN: ICD-10-PCS | Mod: S$PBB,AQ,, | Performed by: FAMILY MEDICINE

## 2022-06-16 PROCEDURE — 99213 OFFICE O/P EST LOW 20 MIN: CPT | Mod: S$PBB,AQ,, | Performed by: FAMILY MEDICINE

## 2022-06-16 RX ORDER — METHOCARBAMOL 500 MG/1
500 TABLET, FILM COATED ORAL 3 TIMES DAILY
COMMUNITY
Start: 2022-06-15 | End: 2022-10-20 | Stop reason: ALTCHOICE

## 2022-06-16 RX ORDER — SYRINGE WITH NEEDLE, 1 ML 25GX5/8"
SYRINGE, EMPTY DISPOSABLE MISCELLANEOUS
COMMUNITY
Start: 2022-05-11

## 2022-06-16 RX ORDER — LISDEXAMFETAMINE DIMESYLATE 50 MG/1
CAPSULE ORAL
Qty: 30 CAPSULE | Refills: 0 | Status: SHIPPED | OUTPATIENT
Start: 2022-06-16 | End: 2022-07-13 | Stop reason: SDUPTHER

## 2022-06-16 RX ORDER — BUPROPION HYDROCHLORIDE 150 MG/1
150 TABLET, EXTENDED RELEASE ORAL 2 TIMES DAILY
Qty: 180 TABLET | Refills: 3 | Status: SHIPPED | OUTPATIENT
Start: 2022-06-16 | End: 2022-08-12 | Stop reason: SDUPTHER

## 2022-06-16 RX ORDER — MELOXICAM 15 MG/1
15 TABLET ORAL DAILY
COMMUNITY
Start: 2022-06-06 | End: 2022-07-28

## 2022-06-16 RX ORDER — NEEDLES, DISPOSABLE 25GX5/8"
NEEDLE, DISPOSABLE MISCELLANEOUS
COMMUNITY
Start: 2022-05-11

## 2022-06-16 NOTE — PROGRESS NOTES
SUBJECTIVE:    Patient ID: Bharath Parr is a 51 y.o. male.    Chief Complaint: Depression    HPI  Bharath Parr is a 51 y.o. male with a hx of Dilated Cardiomyopathy (EF 45%, followed by Dr. Taylor), HTN, HLD, Hypothyroid, GERD, Obesity, ADHD, SONYA, Fibromyalgia (Dr Almendarez), and Chronic pain (managed by Dr Frazier). He comes in for scheduled follow up on ADHD and Dysthymia.    At last OV, his Bupropion was increased to 150mg BID. Today, he reports that he is feeling better, more leveled. Feels his mood is well controlled on current regimen. Regarding his ADHD, he continues to do well on same dose Vyvanse, and reports no changes. Denies palpitations or appetite changes.    Changes since last OV include:  - Pain management rx'd Methocarbamol.   - Will be transferring care from Dr Almendarez to Dr Feliciano at end of year.    Lab Visit on 04/12/2022   Component Date Value Ref Range Status    Cryoglobulin 04/12/2022 Comment  None detected Final    Angio Convert Enzyme 04/12/2022 51  14 - 82 U/L Final    Hepatitis B Surface Ag 04/12/2022 Negative  Negative Final    RBC 04/12/2022 4.97  4.14 - 5.80 x10E6/uL Final    G6PD Quant 04/12/2022 261  127 - 427 U/10E12 RBC Final    QuantiFERON Criteria 04/12/2022 Comment   Final    QuantiFERON TB1 Ag Value 04/12/2022 0.01  IU/mL Final    QuantiFERON TB2 Ag Value 04/12/2022 0.04  IU/mL Final    Quantiferon Mitogen Value 04/12/2022 >10.00  IU/mL Final    Quantiferon Nil Value 04/12/2022 0.01  IU/mL Final    QuantiFERON-TB Gold Plus 04/12/2022 Negative  Negative Final   Lab Visit on 03/21/2022   Component Date Value Ref Range Status    WBC 03/21/2022 8.62  3.90 - 12.70 K/uL Final    RBC 03/21/2022 4.82  4.60 - 6.20 M/uL Final    Hemoglobin 03/21/2022 14.1  14.0 - 18.0 g/dL Final    Hematocrit 03/21/2022 44.1  40.0 - 54.0 % Final    MCV 03/21/2022 92  82 - 98 fL Final    MCH 03/21/2022 29.3  27.0 - 31.0 pg Final    MCHC 03/21/2022 32.0  32.0 -  36.0 g/dL Final    RDW 03/21/2022 13.0  11.5 - 14.5 % Final    Platelets 03/21/2022 327  150 - 450 K/uL Final    MPV 03/21/2022 8.5 (A) 9.2 - 12.9 fL Final    Immature Granulocytes 03/21/2022 0.1  0.0 - 0.5 % Final    Gran # (ANC) 03/21/2022 5.2  1.8 - 7.7 K/uL Final    Immature Grans (Abs) 03/21/2022 0.01  0.00 - 0.04 K/uL Final    Lymph # 03/21/2022 2.1  1.0 - 4.8 K/uL Final    Mono # 03/21/2022 0.9  0.3 - 1.0 K/uL Final    Eos # 03/21/2022 0.4  0.0 - 0.5 K/uL Final    Baso # 03/21/2022 0.04  0.00 - 0.20 K/uL Final    nRBC 03/21/2022 0  0 /100 WBC Final    Gran % 03/21/2022 59.7  38.0 - 73.0 % Final    Lymph % 03/21/2022 24.5  18.0 - 48.0 % Final    Mono % 03/21/2022 10.3  4.0 - 15.0 % Final    Eosinophil % 03/21/2022 4.9  0.0 - 8.0 % Final    Basophil % 03/21/2022 0.5  0.0 - 1.9 % Final    Differential Method 03/21/2022 Automated   Final    Sodium 03/21/2022 139  136 - 145 mmol/L Final    Potassium 03/21/2022 4.1  3.5 - 5.1 mmol/L Final    Chloride 03/21/2022 102  95 - 110 mmol/L Final    CO2 03/21/2022 30 (A) 23 - 29 mmol/L Final    Glucose 03/21/2022 107  70 - 110 mg/dL Final    BUN 03/21/2022 10  6 - 20 mg/dL Final    Creatinine 03/21/2022 0.9  0.5 - 1.4 mg/dL Final    Calcium 03/21/2022 9.2  8.7 - 10.5 mg/dL Final    Total Protein 03/21/2022 7.7  6.0 - 8.4 g/dL Final    Albumin 03/21/2022 4.3  3.5 - 5.2 g/dL Final    Total Bilirubin 03/21/2022 0.5  0.1 - 1.0 mg/dL Final    Alkaline Phosphatase 03/21/2022 85  55 - 135 U/L Final    AST 03/21/2022 16  10 - 40 U/L Final    ALT 03/21/2022 14  10 - 44 U/L Final    Anion Gap 03/21/2022 7 (A) 8 - 16 mmol/L Final    eGFR if African American 03/21/2022 >60.0  >60 mL/min/1.73 m^2 Final    eGFR if non African American 03/21/2022 >60.0  >60 mL/min/1.73 m^2 Final    Sed Rate 03/21/2022 13 (A) 0 - 10 mm/Hr Final    CRP 03/21/2022 1.38 (A) <0.76 mg/dL Final    JANET 03/21/2022 Negative   Final    Anti-SSA Antibody 03/21/2022 <0.2  0.0 -  0.9 AI Final    HLA B27 03/21/2022 Negative   Final    Myeloperoxidase Ab 03/21/2022 <9.0  0.0 - 9.0 U/mL Final    Proteinase 3 Antibody 03/21/2022 <3.5  0.0 - 3.5 U/mL Final    Cytoplasmic Neutrophilic Ab 03/21/2022 <1:20  Neg:<1:20 titer Final    Perinuclear (P-ANCA) 03/21/2022 <1:20  Neg:<1:20 titer Final    Atypical pANCA 03/21/2022 <1:20  Neg:<1:20 titer Final    Total Protein 03/21/2022 7.4  6.0 - 8.5 g/dL Final    Albumin 03/21/2022 3.7  2.9 - 4.4 g/dL Final    Alpha-1 03/21/2022 0.3  0.0 - 0.4 g/dL Final    Alpha-2 03/21/2022 1.0  0.4 - 1.0 g/dL Final    Beta 03/21/2022 1.2  0.7 - 1.3 g/dL Final    Gamma 03/21/2022 1.2  0.4 - 1.8 g/dL Final    M-SPIKE, PROT ELECTRO 03/21/2022 Not Observed  Not Observed g/dL Final    Globulin, Total 03/21/2022 3.7  2.2 - 3.9 g/dL Final    A/G Ratio 03/21/2022 1.0  0.7 - 1.7 Final    Please Note: 03/21/2022 Comment   Final    Rheumatoid Factor 03/21/2022 45.1 (A) <14.0 IU/mL Final    CCP Antibodies 03/21/2022 7  0 - 19 units Final    CPK 03/21/2022 65  20 - 200 U/L Final    Uric Acid 03/21/2022 6.1  3.4 - 7.0 mg/dL Final    Specimen UA 03/21/2022 Urine, Clean Catch   Final    Color, UA 03/21/2022 Yellow  Yellow, Straw, [place] Final    Appearance, UA 03/21/2022 Clear  Clear Final    pH, UA 03/21/2022 6.0  5.0 - 8.0 Final    Specific [place], UA 03/21/2022 >1.030 (A) 1.005 - 1.030 Final    Protein, UA 03/21/2022 1+ (A) Negative Final    Glucose, UA 03/21/2022 Negative  Negative Final    Ketones, UA 03/21/2022 1+ (A) Negative Final    Bilirubin (UA) 03/21/2022 1+ (A) Negative Final    Occult Blood UA 03/21/2022 Negative  Negative Final    Nitrite, UA 03/21/2022 Negative  Negative Final    Urobilinogen, UA 03/21/2022 2.0-3.0 (A) Negative EU/dL Final    Leukocytes, UA 03/21/2022 Negative  Negative Final    RBC, UA 03/21/2022 2  0 - 4 /hpf Final    WBC, UA 03/21/2022 1  0 - 5 /hpf Final    Bacteria 03/21/2022 Negative  None-Occ /hpf Final    Squam  [place], UA 03/21/2022 1  /hpf Final    Hyaline Casts, UA 03/21/2022 6 (A) 0-1/lpf /lpf Final    Microscopic Comment 03/21/2022 SEE COMMENT   Final       Past Medical History:   Diagnosis Date    ADD (attention deficit disorder)     Anxiety     Asthma     intermittent    Cardiomyopathy     Idiopathic    CHF (congestive heart failure)     Hyperlipidemia     Hypertension     Low testosterone     Sleep apnea      Past Surgical History:   Procedure Laterality Date    CARDIAC CATHETERIZATION      left arm surgery      FB removal     Family History   Problem Relation Age of Onset    Cancer Mother         cervical    Heart disease Mother     Hypertension Mother     Stroke Father     Cancer Maternal Grandmother         bladder    Macular degeneration Paternal Grandmother     Cancer Paternal Grandfather         skin    Alzheimer's disease Paternal Grandfather        Tobacco History:  reports that he has never smoked. He has never used smokeless tobacco.  Alcohol History:  reports current alcohol use.  Drug History:  reports no history of drug use.    Review of patient's allergies indicates:   Allergen Reactions    Doxycycline      Makes my throat swell    Lyrica [pregabalin]      Memory loss    Micardis [telmisartan] Other (See Comments)     cough    Promethazine Other (See Comments)     Makes my skin crawl      Tetracyclines Other (See Comments) and Swelling       Current Outpatient Medications:     acetaminophen (TYLENOL) 500 MG tablet, Take 500 mg by mouth every 6 (six) hours as needed for Pain., Disp: , Rfl:     azelastine-fluticasone (DYMISTA) 137-50 mcg/spray Spry nassal spray, Use 1 spray(s) in each nostril twice daily, Disp: 23 g, Rfl: 5    carvediloL (COREG) 25 MG tablet, Take 25 mg by mouth 2 (two) times daily., Disp: , Rfl:     cetirizine (ZYRTEC) 10 MG tablet, Take 10 mg by mouth 2 (two) times daily., Disp: , Rfl:     EPINEPHrine (EPIPEN) 0.3 mg/0.3 mL AtIn, Inject contents of epi  "pen at first sign of severe allergic reaction or anaphylaxis., Disp: 2 each, Rfl: 1    ergocalciferol (ERGOCALCIFEROL) 50,000 unit Cap, Take 50,000 Units by mouth every 7 days., Disp: , Rfl:     ezetimibe (ZETIA) 10 mg tablet, Take 10 mg by mouth once daily., Disp: , Rfl: 5    famotidine (PEPCID) 20 MG tablet, Take 20 mg by mouth 2 (two) times daily., Disp: , Rfl:     hydrOXYchloroQUINE (PLAQUENIL) 200 mg tablet, Take 1 tablet (200 mg total) by mouth once daily., Disp: 180 tablet, Rfl: 1    irbesartan (AVAPRO) 75 MG tablet, Take 75 mg by mouth once daily., Disp: , Rfl:     levothyroxine (SYNTHROID) 75 MCG tablet, Take 75 mcg by mouth once daily., Disp: , Rfl:     olopatadine (PATADAY) 0.2 % Drop, INSTILL 1 DROP INTO EACH EYE ONCE DAILY, Disp: 2.5 mL, Rfl: 5    oxyCODONE-acetaminophen (PERCOCET)  mg per tablet, Take 1 tablet by mouth every 4 (four) hours as needed for Pain., Disp: , Rfl:     pantoprazole (PROTONIX) 40 MG tablet, Take 1 tablet by mouth once daily, Disp: 90 tablet, Rfl: 1    potassium chloride SA (K-DUR,KLOR-CON) 20 MEQ tablet, Take 20 mEq by mouth as needed. , Disp: , Rfl:     pravastatin (PRAVACHOL) 20 MG tablet, Take 20 mg by mouth once daily., Disp: , Rfl:     testosterone cypionate (DEPOTESTOTERONE CYPIONATE) 200 mg/mL injection, Inject 200 mg into the muscle every 14 (fourteen) days. 05mL every 2 weeks, Disp: , Rfl:     BD LUER-KRISTIE SYRINGE 3 mL 21 gauge x 1 1/2" Syrg, SMARTSIG:Injection Every 2 Weeks, Disp: , Rfl:     BD REGULAR BEVEL NEEDLES 18 gauge x 1 1/2" Ndle, USE NEEDLES TO DRAW UP TESTOSTERONE EVERY 2 WEEKS, Disp: , Rfl:     buPROPion (WELLBUTRIN SR) 150 MG TBSR 12 hr tablet, Take 1 tablet (150 mg total) by mouth 2 (two) times daily., Disp: 180 tablet, Rfl: 3    diflunisaL (DOLOBID) 500 mg Tab, TAKE 1/2 TO 1 (ONE-HALF TO ONE) TABLET BY MOUTH 2 TO 3 TIMES DAILY AFTER A MEAL AS NEEDED (Patient not taking: Reported on 6/16/2022), Disp: 90 tablet, Rfl: 0    meloxicam " "(MOBIC) 15 MG tablet, Take 15 mg by mouth once daily., Disp: , Rfl:     methocarbamoL (ROBAXIN) 500 MG Tab, Take 500 mg by mouth 3 (three) times daily., Disp: , Rfl:     VYVANSE 50 mg capsule, TAKE 1 CAPSULE BY MOUTH ONCE DAILY, Disp: 30 capsule, Rfl: 0    Review of Systems  As in HPI           Objective:      Vitals:    06/16/22 1133   BP: 138/78   Pulse: 86   Temp: 98.2 °F (36.8 °C)   TempSrc: Oral   SpO2: 98%   Weight: 113.9 kg (251 lb)   Height: 5' 10" (1.778 m)     Physical Exam  Vitals reviewed.   Constitutional:       General: He is not in acute distress.  Cardiovascular:      Rate and Rhythm: Normal rate and regular rhythm.      Pulses: Normal pulses.      Heart sounds: Normal heart sounds.   Pulmonary:      Effort: Pulmonary effort is normal.   Skin:     General: Skin is warm and dry.   Neurological:      General: No focal deficit present.      Mental Status: He is alert and oriented to person, place, and time.   Psychiatric:         Mood and Affect: Mood normal.         Behavior: Behavior normal.              Assessment:       1. Dysthymia    2. Attention deficit hyperactivity disorder (ADHD), unspecified ADHD type             Plan:       Dysthymia improved. Continue current regimen.  -     buPROPion (WELLBUTRIN SR) 150 MG TBSR 12 hr tablet; Take 1 tablet (150 mg total) by mouth 2 (two) times daily.  Dispense: 180 tablet; Refill: 3    Attention deficit hyperactivity disorder (ADHD), unspecified ADHD type unchanged. Continue current regimen. Follow up in 3 months with refills at 30 and 60 days via portal refill request message.  -     VYVANSE 50 mg capsule; TAKE 1 CAPSULE BY MOUTH ONCE DAILY  Dispense: 30 capsule; Refill: 0      No follow-ups on file.        6/17/2022 Jsoefina Strong M.D.    "

## 2022-06-17 ENCOUNTER — HOSPITAL ENCOUNTER (EMERGENCY)
Facility: HOSPITAL | Age: 52
Discharge: HOME | End: 2022-06-17
Attending: EMERGENCY MEDICINE
Payer: MEDICARE

## 2022-06-17 VITALS
DIASTOLIC BLOOD PRESSURE: 98 MMHG | HEIGHT: 70 IN | SYSTOLIC BLOOD PRESSURE: 145 MMHG | TEMPERATURE: 98 F | RESPIRATION RATE: 18 BRPM | BODY MASS INDEX: 35.93 KG/M2 | HEART RATE: 75 BPM | OXYGEN SATURATION: 99 % | WEIGHT: 251 LBS

## 2022-06-17 DIAGNOSIS — S06.0X0A CONCUSSION WITHOUT LOSS OF CONSCIOUSNESS, INITIAL ENCOUNTER: Primary | ICD-10-CM

## 2022-06-17 LAB
ALBUMIN SERPL BCP-MCNC: 4.3 G/DL (ref 3.5–5.2)
ALP SERPL-CCNC: 77 U/L (ref 55–135)
ALT SERPL W/O P-5'-P-CCNC: 12 U/L (ref 10–44)
ANION GAP SERPL CALC-SCNC: 7 MMOL/L (ref 8–16)
AST SERPL-CCNC: 19 U/L (ref 10–40)
BASOPHILS # BLD AUTO: 0.03 K/UL (ref 0–0.2)
BASOPHILS NFR BLD: 0.3 % (ref 0–1.9)
BILIRUB SERPL-MCNC: 0.7 MG/DL (ref 0.1–1)
BILIRUB UR QL STRIP: NEGATIVE
BUN SERPL-MCNC: 9 MG/DL (ref 6–20)
CALCIUM SERPL-MCNC: 9 MG/DL (ref 8.7–10.5)
CHLORIDE SERPL-SCNC: 102 MMOL/L (ref 95–110)
CLARITY UR: CLEAR
CO2 SERPL-SCNC: 28 MMOL/L (ref 23–29)
COLOR UR: YELLOW
CREAT SERPL-MCNC: 1.1 MG/DL (ref 0.5–1.4)
DIFFERENTIAL METHOD: ABNORMAL
EOSINOPHIL # BLD AUTO: 0.2 K/UL (ref 0–0.5)
EOSINOPHIL NFR BLD: 2.2 % (ref 0–8)
ERYTHROCYTE [DISTWIDTH] IN BLOOD BY AUTOMATED COUNT: 13 % (ref 11.5–14.5)
EST. GFR  (AFRICAN AMERICAN): >60 ML/MIN/1.73 M^2
EST. GFR  (NON AFRICAN AMERICAN): >60 ML/MIN/1.73 M^2
GLUCOSE SERPL-MCNC: 89 MG/DL (ref 70–110)
GLUCOSE UR QL STRIP: NEGATIVE
HCT VFR BLD AUTO: 45.2 % (ref 40–54)
HGB BLD-MCNC: 14.6 G/DL (ref 14–18)
HGB UR QL STRIP: NEGATIVE
IMM GRANULOCYTES # BLD AUTO: 0.03 K/UL (ref 0–0.04)
IMM GRANULOCYTES NFR BLD AUTO: 0.3 % (ref 0–0.5)
KETONES UR QL STRIP: NEGATIVE
LEUKOCYTE ESTERASE UR QL STRIP: NEGATIVE
LYMPHOCYTES # BLD AUTO: 1.3 K/UL (ref 1–4.8)
LYMPHOCYTES NFR BLD: 15.1 % (ref 18–48)
MCH RBC QN AUTO: 28.8 PG (ref 27–31)
MCHC RBC AUTO-ENTMCNC: 32.3 G/DL (ref 32–36)
MCV RBC AUTO: 89 FL (ref 82–98)
MONOCYTES # BLD AUTO: 0.7 K/UL (ref 0.3–1)
MONOCYTES NFR BLD: 7.8 % (ref 4–15)
NEUTROPHILS # BLD AUTO: 6.6 K/UL (ref 1.8–7.7)
NEUTROPHILS NFR BLD: 74.3 % (ref 38–73)
NITRITE UR QL STRIP: NEGATIVE
NRBC BLD-RTO: 0 /100 WBC
PH UR STRIP: 6 [PH] (ref 5–8)
PLATELET # BLD AUTO: 282 K/UL (ref 150–450)
PMV BLD AUTO: 8.6 FL (ref 9.2–12.9)
POTASSIUM SERPL-SCNC: 4.1 MMOL/L (ref 3.5–5.1)
PROT SERPL-MCNC: 7.8 G/DL (ref 6–8.4)
PROT UR QL STRIP: ABNORMAL
RBC # BLD AUTO: 5.07 M/UL (ref 4.6–6.2)
SODIUM SERPL-SCNC: 137 MMOL/L (ref 136–145)
SP GR UR STRIP: 1.02 (ref 1–1.03)
TSH SERPL DL<=0.005 MIU/L-ACNC: 0.69 UIU/ML (ref 0.34–5.6)
URN SPEC COLLECT METH UR: ABNORMAL
UROBILINOGEN UR STRIP-ACNC: NEGATIVE EU/DL
WBC # BLD AUTO: 8.82 K/UL (ref 3.9–12.7)

## 2022-06-17 PROCEDURE — 85025 COMPLETE CBC W/AUTO DIFF WBC: CPT | Performed by: PHYSICIAN ASSISTANT

## 2022-06-17 PROCEDURE — 81003 URINALYSIS AUTO W/O SCOPE: CPT | Performed by: PHYSICIAN ASSISTANT

## 2022-06-17 PROCEDURE — 84443 ASSAY THYROID STIM HORMONE: CPT | Performed by: PHYSICIAN ASSISTANT

## 2022-06-17 PROCEDURE — 99284 EMERGENCY DEPT VISIT MOD MDM: CPT | Mod: 25

## 2022-06-17 PROCEDURE — 80053 COMPREHEN METABOLIC PANEL: CPT | Performed by: PHYSICIAN ASSISTANT

## 2022-06-17 NOTE — FIRST PROVIDER EVALUATION
" Emergency Department TeleTriage Encounter Note      CHIEF COMPLAINT    Chief Complaint   Patient presents with    Head Injury     Pt unsure of when he hit his head on steal tractor bucket. Pt confused, having headaches, blurry vision, drowsiness, memory loss.        VITAL SIGNS   Initial Vitals [06/17/22 1400]   BP Pulse Resp Temp SpO2   (!) 177/111 89 18 98.5 °F (36.9 °C) 98 %      MAP       --            ALLERGIES    Review of patient's allergies indicates:   Allergen Reactions    Doxycycline      Makes my throat swell    Lyrica [pregabalin]      Memory loss    Micardis [telmisartan] Other (See Comments)     cough    Promethazine Other (See Comments)     Makes my skin crawl      Tetracyclines Other (See Comments) and Swelling       PROVIDER TRIAGE NOTE  This is a teletriage evaluation of a 51 y.o. male presenting to the ED with c/o memory loss/confusion, fatigue, nausea, dizziness x 1-2 weeks started after hitting head "hard" against steel several weeks ago. No infectious symptoms, no repeated trauma.     PE: speech mildly slowly, difficulty remembering events/qualifying symptoms. Non-toxic/well-appearing. No respiratory distress, speaks in full sentences without issue. No active emesis nor cough. Normal eye contact and mentation.     Plan: CTH, labs. Further/augmented workup at discretion of examining provider.     All ED beds are full at present; patient notified of this status.  Patient seen and medically screened by [facility] via teletriage. Orders initiated at triage to expedite care.  Patient is stable and will be placed in an ED bed when available.  Care will be transferred to an alternate provider when patient has been placed in an Exam Room further exam, additional orders, and disposition.         ORDERS  Labs Reviewed   CBC W/ AUTO DIFFERENTIAL   COMPREHENSIVE METABOLIC PANEL   URINALYSIS, REFLEX TO URINE CULTURE   TSH       ED Orders (720h ago, onward)    Start Ordered     Status Ordering Provider    " 06/17/22 1428 06/17/22 1427  CBC Auto Differential  STAT         Ordered MAC FAGAN.    06/17/22 1428 06/17/22 1427  Comprehensive metabolic panel  STAT         Ordered MAC FAGANSebastian    06/17/22 1428 06/17/22 1427  Urinalysis, Reflex to Urine Culture Urine, Clean Catch  STAT         Ordered MAC FAGAN.    06/17/22 1428 06/17/22 1428  TSH  STAT         Ordered MAC FAGANSebastian    06/17/22 1427 06/17/22 1427  CT Head Without Contrast  1 time imaging         Ordered MAC FAGAN ROSA            Virtual Visit Note: The provider triage portion of this emergency department evaluation and documentation was performed via [employer], a HIPAA-compliant telemedicine application, in concert with a tele-presenter in the room. A face to face patient evaluation with one of my colleagues will occur once the patient is placed in an emergency department room.      DISCLAIMER: This note was prepared with [employer] voice recognition transcription software. Garbled syntax, mangled pronouns, and other bizarre constructions may be attributed to that software system.

## 2022-06-17 NOTE — ED PROVIDER NOTES
Encounter Date: 6/17/2022       History     Chief Complaint   Patient presents with    Head Injury     Pt unsure of when he hit his head on steal tractor bucket. Pt confused, having headaches, blurry vision, drowsiness, memory loss.      51-year-old male presents emergency room for evaluation of confusion, memory loss, drowsiness and headache.  Patient states that he hit his head on a tractor 3 weeks ago, did not lose consciousness.  He has only had symptoms for about the last week.  He denies visual changes, denies numbness or tingling.  He denies loss of motor function in extremities.  Patient with history of rheumatoid arthritis hypertension, hyperlipidemia.  Patient is on chronic opioid pain control with Percocet 10.         Review of patient's allergies indicates:   Allergen Reactions    Doxycycline      Makes my throat swell    Lyrica [pregabalin]      Memory loss    Micardis [telmisartan] Other (See Comments)     cough    Promethazine Other (See Comments)     Makes my skin crawl      Tetracyclines Other (See Comments) and Swelling     Past Medical History:   Diagnosis Date    ADD (attention deficit disorder)     Anxiety     Asthma     intermittent    Cardiomyopathy     Idiopathic    CHF (congestive heart failure)     Hyperlipidemia     Hypertension     Low testosterone     Rheumatoid arthritis, unspecified     Sleep apnea      Past Surgical History:   Procedure Laterality Date    CARDIAC CATHETERIZATION      left arm surgery      FB removal     Family History   Problem Relation Age of Onset    Cancer Mother         cervical    Heart disease Mother     Hypertension Mother     Stroke Father     Cancer Maternal Grandmother         bladder    Macular degeneration Paternal Grandmother     Cancer Paternal Grandfather         skin    Alzheimer's disease Paternal Grandfather      Social History     Tobacco Use    Smoking status: Never Smoker    Smokeless tobacco: Never Used   Substance Use  Topics    Alcohol use: Yes     Comment: socially    Drug use: No     Review of Systems   Constitutional: Negative for chills, fatigue and fever.   HENT: Negative for congestion, hearing loss, sore throat and trouble swallowing.    Eyes: Negative for visual disturbance.   Respiratory: Negative for cough, chest tightness and shortness of breath.    Cardiovascular: Negative for chest pain.   Gastrointestinal: Negative for abdominal pain and nausea.   Endocrine: Negative for polyuria.   Genitourinary: Negative for difficulty urinating.   Musculoskeletal: Negative for arthralgias and myalgias.   Skin: Negative for rash.   Neurological: Positive for weakness, light-headedness and headaches. Negative for dizziness.   Psychiatric/Behavioral: The patient is not nervous/anxious.    All other systems reviewed and are negative.      Physical Exam     Initial Vitals [06/17/22 1400]   BP Pulse Resp Temp SpO2   (!) 177/111 89 18 98.5 °F (36.9 °C) 98 %      MAP       --         Physical Exam    Nursing note and vitals reviewed.  Constitutional: He appears well-developed and well-nourished.   HENT:   Head: Normocephalic and atraumatic.   Eyes: Conjunctivae and EOM are normal.   Neck: Neck supple.   Cardiovascular: Normal rate, regular rhythm and normal heart sounds.   Pulmonary/Chest: Breath sounds normal. No respiratory distress.   Musculoskeletal:         General: Normal range of motion.      Cervical back: Neck supple.     Neurological: GCS eye subscore is 4. GCS verbal subscore is 5. GCS motor subscore is 6.   Patient is alert and oriented to person, place, time, and event. GCS 15: Motor 6, Verbal 5, Eye 4. No focal neurologic deficits.  No facial droop, dysarthria, or aphasia.     CN 2-12 Intact.   -Patient has no deviation of baseline vision. Normal Confrontation (CN II)  -Extraocular movements are full, physiologic nystagmus is present. Eyes converge equally and pupils constrict with near focus. (CN III, IV, VI)  -Patient  able to fully open and close jaw. Equal sensation over bilateral temples, cheeks, and jawline. (CN V)  -Patient able to raise eyebrows and expand cheeks (CN VII)  -Patient able to lateralize sounds bilaterally (CN VIII)  -Uvula is midline and rises symmetrically with soft palate on phonation, active gag reflex (CN IX, X)  -Patient with equal rise of shoulders and equal strength on resisted rotation of neck b/l. Strength 5/5. (CN XI)  -Patient able to stick out tongue at midline and move side to side, resists against deviation by me (CN XII)  PERRLA. No visual field defects.  Strength 5/5 bilateral upper and lower extremities. No atrophy. Reflexes 2+   Negative Romberg.   No decreased proprioception sensation to suggest dorsal column injury. Patient able to verbalize up/down of DIP joint. Patient able to identify pen in hand with eyes closed.   No decreased sensation to light touch, pain, or pressure to suggest spinothalamic pathway injury.  No cerebellar signs:  -No dysmetria. Heel-to-Shin and Finger-To-Nose b/l with smooth movements and no overshoot.   -No dysdiadochokinesis. Hand and Feet rapid alternating movements are quick, smooth, and rhythmic b/l.   -No pronator drift.  Gait is not abnormal. Not wide, patient able to walk heel-to-toe.     Skin: Skin is warm and dry. Capillary refill takes less than 2 seconds.   Psychiatric: He has a normal mood and affect. His behavior is normal. Judgment and thought content normal.         ED Course   Procedures  Labs Reviewed   CBC W/ AUTO DIFFERENTIAL - Abnormal; Notable for the following components:       Result Value    MPV 8.6 (*)     Gran % 74.3 (*)     Lymph % 15.1 (*)     All other components within normal limits    Narrative:     Recoll. [id number] by JHILARY at 06/17/2022 17:22, reason: No label on   specimen notified ER nurse for recollect   COMPREHENSIVE METABOLIC PANEL - Abnormal; Notable for the following components:    Anion Gap 7 (*)     All other components  within normal limits    Narrative:     Recoll. [id number] by [email or web address] at 06/17/2022 17:22, reason: No label on   specimen notified ER nurse for recollect   URINALYSIS, REFLEX TO URINE CULTURE - Abnormal; Notable for the following components:    Protein, UA Trace (*)     All other components within normal limits    Narrative:     Specimen Source->Urine   TSH    Narrative:     Recoll. [id number] by [email or web address] at 06/17/2022 17:22, reason: No label on   specimen notified ER nurse for recollect          Imaging Results          CT Head Without Contrast (Final result)  Result time 06/17/22 15:30:54    Final result by Carlos Coker MD (06/17/22 15:30:54)                 Narrative:    CMS MANDATED QUALITY DATA - CT RADIATION  436    All CT scans at this facility utilize dose modulation, iterative reconstruction, and/or weight based dosing when appropriate to reduce radiation dose to as low as reasonably achievable.    CT HEAD WITHOUT IV CONTRAST    CLINICAL HISTORY:  51 years Male Head trauma, abnormal mental status (Age 19-64y)    COMPARISON: None    FINDINGS: There is no acute intracranial hemorrhage, midline shift, or mass effect. Ventricles and sulci are normal in size. Gray-white differentiation is maintained. Cerebellar hemispheres and brainstem are unremarkable.    No calvarial fracture or aggressive lesion is seen. Visualized paranasal sinuses and mastoid air cells are clear.    Impression: No CT evidence of acute intracranial pathology.    Electronically signed by:  Carlos Coker MD  6/17/2022 3:30 PM CDT Workstation: [id number]                               Medications - No data to display  Medical Decision Making:   Initial Assessment:   51-year-old male presents emergency room for evaluation of confusion, memory loss, drowsiness and headache.  Patient states that he hit his head on a tractor 3 weeks ago, did not lose consciousness.  He has only had symptoms for about the last week.  He denies visual changes, denies  numbness or tingling.  He denies loss of motor function in extremities.  Patient with history of rheumatoid arthritis hypertension, hyperlipidemia.  Patient is on chronic opioid pain control with Percocet 10.   ED Management:  51-year-old male presents emergency room for evaluation of confusion, memory loss, drowsiness and headache.  Patient states that he hit his head against a tractor 3 weeks ago, did not lose consciousness.  His symptoms began only week ago.  Neurologic exam is normal, CT without evidence of acute intracranial abnormality.  Likely represents concussion.  Overall, patient is nontoxic, well-appearing and stable on discharge.    Disposition:  Improved, discharged.  Plan to discharge home with appropriate follow-up, including primary care manager.    I discussed the findings and plan of care with this patient.  All questions were answered to the patient's satisfaction.  Disposition plan as above.  Verbal and written discharge instructions provided to the patient on discharge.  Return precautions discussed prior to discharge.     I discuss this patient case with the cosigning physician, who agrees with diagnosis and plan of care. This note was written using the assistance of a dictation program and may contain grammatical errors.              ED Course as of 06/17/22 2342 Fri Jun 17, 2022 1927 Called lab to check on labs.  Lab and formula recollect was called to the ER 2 hours ago, prior to patient being roomed. [AN]      ED Course User Index  [AN] Anival Lake PA-C             Clinical Impression:   Final diagnoses:  [S06.0X0A] Concussion without loss of consciousness, initial encounter (Primary)          ED Disposition Condition    Discharge Stable        ED Prescriptions     None        Follow-up Information     Follow up With Specialties Details Why Contact Info    Josefina Kuhn MD Family Medicine Schedule an appointment as soon as possible for a visit in 1 week  [address]  [address]  [facility] [address]  [phone]      Beto Beckwith MD Neurology Call in 1 week  [address]  [facility] [address]  [phone]             Anival Lake PA-C  06/17/22 5914

## 2022-06-17 NOTE — ED TRIAGE NOTES
Hit head on bucket on tractor x 2 weeks ago.   Symptoms started x 1 week ago.  Loss of concentration w/ some confusion.  + indentation to left top of head, denies loc at incident time.  Denies n/v or fever.  + diarrhea x 2 days.

## 2022-07-04 DIAGNOSIS — H10.13 ALLERGIC CONJUNCTIVITIS OF BOTH EYES: ICD-10-CM

## 2022-07-06 ENCOUNTER — PATIENT MESSAGE (OUTPATIENT)
Dept: FAMILY MEDICINE | Facility: CLINIC | Age: 52
End: 2022-07-06

## 2022-07-07 RX ORDER — OLOPATADINE HYDROCHLORIDE 2 MG/ML
SOLUTION/ DROPS OPHTHALMIC
Qty: 3 ML | Refills: 0 | OUTPATIENT
Start: 2022-07-07

## 2022-07-28 ENCOUNTER — OFFICE VISIT (OUTPATIENT)
Dept: RHEUMATOLOGY | Facility: CLINIC | Age: 52
End: 2022-07-28
Payer: MEDICARE

## 2022-07-28 ENCOUNTER — LAB VISIT (OUTPATIENT)
Dept: LAB | Facility: HOSPITAL | Age: 52
End: 2022-07-28
Attending: INTERNAL MEDICINE
Payer: MEDICARE

## 2022-07-28 VITALS — WEIGHT: 253.5 LBS | SYSTOLIC BLOOD PRESSURE: 113 MMHG | DIASTOLIC BLOOD PRESSURE: 71 MMHG | BODY MASS INDEX: 36.37 KG/M2

## 2022-07-28 DIAGNOSIS — M06.9 RHEUMATOID ARTHRITIS, INVOLVING UNSPECIFIED SITE, UNSPECIFIED WHETHER RHEUMATOID FACTOR PRESENT: ICD-10-CM

## 2022-07-28 DIAGNOSIS — M06.9 RHEUMATOID ARTHRITIS, INVOLVING UNSPECIFIED SITE, UNSPECIFIED WHETHER RHEUMATOID FACTOR PRESENT: Primary | ICD-10-CM

## 2022-07-28 LAB
BASOPHILS # BLD AUTO: 0.05 K/UL (ref 0–0.2)
BASOPHILS NFR BLD: 0.7 % (ref 0–1.9)
CRP SERPL-MCNC: 0.65 MG/DL
DIFFERENTIAL METHOD: ABNORMAL
EOSINOPHIL # BLD AUTO: 0.1 K/UL (ref 0–0.5)
EOSINOPHIL NFR BLD: 1.9 % (ref 0–8)
ERYTHROCYTE [DISTWIDTH] IN BLOOD BY AUTOMATED COUNT: 13.7 % (ref 11.5–14.5)
HCT VFR BLD AUTO: 46.9 % (ref 40–54)
HGB BLD-MCNC: 15 G/DL (ref 14–18)
IMM GRANULOCYTES # BLD AUTO: 0.02 K/UL (ref 0–0.04)
IMM GRANULOCYTES NFR BLD AUTO: 0.3 % (ref 0–0.5)
LYMPHOCYTES # BLD AUTO: 1.2 K/UL (ref 1–4.8)
LYMPHOCYTES NFR BLD: 15.5 % (ref 18–48)
MCH RBC QN AUTO: 29.2 PG (ref 27–31)
MCHC RBC AUTO-ENTMCNC: 32 G/DL (ref 32–36)
MCV RBC AUTO: 91 FL (ref 82–98)
MONOCYTES # BLD AUTO: 0.8 K/UL (ref 0.3–1)
MONOCYTES NFR BLD: 10.9 % (ref 4–15)
NEUTROPHILS # BLD AUTO: 5.4 K/UL (ref 1.8–7.7)
NEUTROPHILS NFR BLD: 70.7 % (ref 38–73)
NRBC BLD-RTO: 0 /100 WBC
PLATELET # BLD AUTO: 283 K/UL (ref 150–450)
PMV BLD AUTO: 9 FL (ref 9.2–12.9)
RBC # BLD AUTO: 5.13 M/UL (ref 4.6–6.2)
WBC # BLD AUTO: 7.55 K/UL (ref 3.9–12.7)

## 2022-07-28 PROCEDURE — 86140 C-REACTIVE PROTEIN: CPT | Performed by: INTERNAL MEDICINE

## 2022-07-28 PROCEDURE — 85025 COMPLETE CBC W/AUTO DIFF WBC: CPT | Performed by: INTERNAL MEDICINE

## 2022-07-28 PROCEDURE — 99213 PR OFFICE/OUTPT VISIT, EST, LEVL III, 20-29 MIN: ICD-10-PCS | Mod: S$GLB,,, | Performed by: INTERNAL MEDICINE

## 2022-07-28 PROCEDURE — 99213 OFFICE O/P EST LOW 20 MIN: CPT | Mod: S$GLB,,, | Performed by: INTERNAL MEDICINE

## 2022-07-28 PROCEDURE — 36415 COLL VENOUS BLD VENIPUNCTURE: CPT | Performed by: INTERNAL MEDICINE

## 2022-07-28 RX ORDER — METHYLPREDNISOLONE 4 MG/1
TABLET ORAL
Qty: 21 EACH | Refills: 0 | Status: SHIPPED | OUTPATIENT
Start: 2022-07-28 | End: 2022-08-18

## 2022-07-28 RX ORDER — DIFLUNISAL 500 MG/1
TABLET, FILM COATED ORAL
Qty: 270 TABLET | Refills: 1 | Status: SHIPPED | OUTPATIENT
Start: 2022-07-28 | End: 2023-01-30 | Stop reason: SDUPTHER

## 2022-07-28 NOTE — PROGRESS NOTES
"         [facility] RHEUMATOLOGY            PROGRESS NOTE      Subjective:       Patient ID:   NAME: Bharath Parr : 1970     51 y.o. male    Referring Doc: No ref. provider found  Other Physicians:    Chief Complaint:  Rheumatoid Arthritis      History of Present Illness:     Patient returns today for a regularly scheduled follow-up visit for seropositive rheumatoid arthritis  The patient continues with diffuse musculoskeletal pain.  Arthralgias and myalgias and fatigue.  No chest pains or cough.  No fever or chills      ROS:   GEN:  No  fever, night sweats . weight is stable   + fatigue  SKIN: no rashes, no bruising, no ulcerations, no Raynaud's  HEENT: no HA's, No visual changes, no mucosal ulcers, no sicca symptoms,  CV:   no CP, SOB, PND, SORENSEN, no orthopnea, no palpitations  PULM: normal with no SOB, cough, hemoptysis, sputum or pleuritic pain  GI:  no abdominal pain, nausea, vomiting, constipation, diarrhea, melanotic stools, BRBPR, hematemesis, no dysphagia  NEURO: no paresthesias, headaches, visual disturbances, muscle weakness  MUSCULOSKELETAL:no joint swelling,+ prolonged AM stiffness, no back pain, + muscle pain  Allergies:  Review of patient's allergies indicates:   Allergen Reactions    Doxycycline      Makes my throat swell    Lyrica [pregabalin]      Memory loss    Micardis [telmisartan] Other (See Comments)     cough    Promethazine Other (See Comments)     Makes my skin crawl      Tetracyclines Other (See Comments) and Swelling       Medications:    Current Outpatient Medications:     acetaminophen (TYLENOL) 500 MG tablet, Take 500 mg by mouth every 6 (six) hours as needed for Pain., Disp: , Rfl:     azelastine-fluticasone (DYMISTA) 137-50 mcg/spray Spry nassal spray, Use 1 spray(s) in each nostril twice daily, Disp: 23 g, Rfl: 5    BD LUER-KRISTIE SYRINGE 3 mL 21 gauge x 1 1/2" Syrg, SMARTSIG:Injection Every 2 Weeks, Disp: , Rfl:     BD REGULAR BEVEL NEEDLES 18 gauge x 1 1/2" Ndle, USE " NEEDLES TO DRAW UP TESTOSTERONE EVERY 2 WEEKS, Disp: , Rfl:     buPROPion (WELLBUTRIN SR) 150 MG TBSR 12 hr tablet, Take 1 tablet (150 mg total) by mouth 2 (two) times daily., Disp: 180 tablet, Rfl: 3    carvediloL (COREG) 25 MG tablet, Take 25 mg by mouth 2 (two) times daily., Disp: , Rfl:     cetirizine (ZYRTEC) 10 MG tablet, Take 10 mg by mouth 2 (two) times daily., Disp: , Rfl:     EPINEPHrine (EPIPEN) 0.3 mg/0.3 mL AtIn, Inject contents of epi pen at first sign of severe allergic reaction or anaphylaxis., Disp: 2 each, Rfl: 1    ergocalciferol (ERGOCALCIFEROL) 50,000 unit Cap, Take 50,000 Units by mouth every 7 days., Disp: , Rfl:     ezetimibe (ZETIA) 10 mg tablet, Take 10 mg by mouth once daily., Disp: , Rfl: 5    famotidine (PEPCID) 20 MG tablet, Take 20 mg by mouth 2 (two) times daily., Disp: , Rfl:     hydrOXYchloroQUINE (PLAQUENIL) 200 mg tablet, Take 1 tablet (200 mg total) by mouth once daily., Disp: 180 tablet, Rfl: 1    irbesartan (AVAPRO) 75 MG tablet, Take 75 mg by mouth once daily., Disp: , Rfl:     levothyroxine (SYNTHROID) 75 MCG tablet, Take 75 mcg by mouth once daily., Disp: , Rfl:     methocarbamoL (ROBAXIN) 500 MG Tab, Take 500 mg by mouth 3 (three) times daily., Disp: , Rfl:     olopatadine (PATADAY) 0.2 % Drop, INSTILL 1 DROP INTO EACH EYE ONCE DAILY, Disp: 2.5 mL, Rfl: 5    oxyCODONE-acetaminophen (PERCOCET)  mg per tablet, Take 1 tablet by mouth every 4 (four) hours as needed for Pain., Disp: , Rfl:     pantoprazole (PROTONIX) 40 MG tablet, Take 1 tablet by mouth once daily, Disp: 90 tablet, Rfl: 0    potassium chloride SA (K-DUR,KLOR-CON) 20 MEQ tablet, Take 20 mEq by mouth as needed. , Disp: , Rfl:     pravastatin (PRAVACHOL) 20 MG tablet, Take 20 mg by mouth once daily., Disp: , Rfl:     testosterone cypionate (DEPOTESTOTERONE CYPIONATE) 200 mg/mL injection, Inject 200 mg into the muscle every 14 (fourteen) days. 05mL every 2 weeks, Disp: , Rfl:     VYVANSE 50  mg capsule, TAKE 1 CAPSULE BY MOUTH ONCE DAILY, Disp: 30 capsule, Rfl: 0    diflunisaL (DOLOBID) 500 mg Tab, TAKE 1/2 TO 1 (ONE-HALF TO ONE) TABLET BY MOUTH 2 TO 3 TIMES DAILY AFTER A MEAL AS NEEDED, Disp: 270 tablet, Rfl: 1    methylPREDNISolone (MEDROL DOSEPACK) 4 mg tablet, use as directed, Disp: 21 each, Rfl: 0    PMHx/PSHx Updates:        Objective:     Vitals:  Blood pressure 113/71, weight 115 kg (253 lb 8 oz).    Physical Examination:   GEN: no apparent distress, comfortable; AAOx3  SKIN: no rashes,no ulceration, no Raynaud's, no petechiae, no SQ nodules,  HEAD: normal  EYES: no pallor, no icterus,  NECK: no masses, thyroid normal, trachea midline, no LAD/LN's, supple  CV: RRR with no murmur; l S1 and S2 reg. ,no gallop no rubs,   CHEST: Normal respiratory effort; CTAB; normal breath sounds; no wheeze or crackles  MUSC/Skeletal: ROM normal; no crepitus; joints without synovitis,  no deformities  No joint swelling or tenderness of PIP, MCP, wrist, elbow, shoulder, or knee joints  EXTREM: no clubbing, cyanosis, no edema  NEURO: grossly intact; motor WNL; AAOx3; ,   PSYCH: normal mood, affect and behavior  LYMPH: normal cervical, supraclavicular          Labs:   Lab Results   Component Value Date    WBC 8.82 06/17/2022    HGB 14.6 06/17/2022    HCT 45.2 06/17/2022    MCV 89 06/17/2022     06/17/2022    CMP  @LASTLAB(NA,K,CL,CO2,GLU,BUN,Creatinine,Calcium,PROT,Albumin,Bilitot,Alkphos,AST,ALT,CRP,ESR,RF,CCP,JANET,SSA,CPK,uric acid) )@  I have reviewed all available lab results and radiology reports.    Radiology/Diagnostic Studies:        Assessment/Plan:   (1) 51 y.o. male with diagnosis of seropos RA ..stable  Fibromyalgia: more symptomatic  Osteoarthritis    Reviewed recent CBC and CMP : ok          Discussion:     I have explained all of the above in detail and the patient understands all of the current recommendation(s). I have answered all questions to the best of my ability and to their complete  satisfaction.       The patient is to continue with the current management plan         RTC in a few months      Electronically signed by Juanjo Almendarez MD    Patient notified that this office will be closing December 22, 2022. They should begin looking for another rheumatologist as soon as possible.  A list with the names and contact details of rheumatologists in the surrounding area was provided.

## 2022-09-12 DIAGNOSIS — G47.19 EXCESSIVE DAYTIME SLEEPINESS: ICD-10-CM

## 2022-09-12 DIAGNOSIS — G47.33 OBSTRUCTIVE SLEEP APNEA: Primary | ICD-10-CM

## 2022-09-12 DIAGNOSIS — R06.83 SNORING: ICD-10-CM

## 2022-09-12 DIAGNOSIS — R53.83 LOSS OF ENERGY: ICD-10-CM

## 2022-09-13 ENCOUNTER — OFFICE VISIT (OUTPATIENT)
Dept: FAMILY MEDICINE | Facility: CLINIC | Age: 52
End: 2022-09-13
Payer: MEDICARE

## 2022-09-13 VITALS
SYSTOLIC BLOOD PRESSURE: 132 MMHG | WEIGHT: 243.19 LBS | BODY MASS INDEX: 34.82 KG/M2 | OXYGEN SATURATION: 98 % | HEIGHT: 70 IN | HEART RATE: 96 BPM | DIASTOLIC BLOOD PRESSURE: 80 MMHG | RESPIRATION RATE: 18 BRPM | TEMPERATURE: 99 F

## 2022-09-13 DIAGNOSIS — R09.81 NASAL CONGESTION: ICD-10-CM

## 2022-09-13 DIAGNOSIS — R05.9 COUGH: Primary | ICD-10-CM

## 2022-09-13 LAB
CTP QC/QA: YES
CTP QC/QA: YES
FLUAV AG NPH QL: NEGATIVE
FLUBV AG NPH QL: NEGATIVE
SARS-COV-2 RDRP RESP QL NAA+PROBE: NEGATIVE

## 2022-09-13 PROCEDURE — 87804 INFLUENZA ASSAY W/OPTIC: CPT | Mod: 59,PBBFAC | Performed by: NURSE PRACTITIONER

## 2022-09-13 PROCEDURE — U0002 COVID-19 LAB TEST NON-CDC: HCPCS | Mod: PBBFAC | Performed by: NURSE PRACTITIONER

## 2022-09-13 PROCEDURE — 99215 OFFICE O/P EST HI 40 MIN: CPT | Performed by: NURSE PRACTITIONER

## 2022-09-13 PROCEDURE — 99214 PR OFFICE/OUTPT VISIT, EST, LEVL IV, 30-39 MIN: ICD-10-PCS | Mod: S$PBB,,, | Performed by: NURSE PRACTITIONER

## 2022-09-13 PROCEDURE — 99214 OFFICE O/P EST MOD 30 MIN: CPT | Mod: S$PBB,,, | Performed by: NURSE PRACTITIONER

## 2022-09-13 RX ORDER — MELOXICAM 15 MG/1
TABLET ORAL
COMMUNITY
Start: 2022-08-04 | End: 2022-10-20 | Stop reason: ALTCHOICE

## 2022-09-13 RX ORDER — BENZONATATE 200 MG/1
200 CAPSULE ORAL 3 TIMES DAILY PRN
Qty: 30 CAPSULE | Refills: 0 | Status: SHIPPED | OUTPATIENT
Start: 2022-09-13 | End: 2022-09-23

## 2022-09-13 RX ORDER — OXYCODONE AND ACETAMINOPHEN 7.5; 325 MG/1; MG/1
1 TABLET ORAL 4 TIMES DAILY PRN
COMMUNITY
Start: 2022-08-09 | End: 2023-11-07

## 2022-09-13 RX ORDER — TRAMADOL HYDROCHLORIDE 50 MG/1
TABLET ORAL
COMMUNITY
Start: 2022-08-09

## 2022-09-13 NOTE — PROGRESS NOTES
SUBJECTIVE:      Patient ID: Bharath Parr is a 51 y.o. male.    Chief Complaint: Cough (Coughing and congestion started yesterday)    51-year-male with a history of anxiety, asthma, CHF, hyperlipidemia, hypertension, sleep apnea, and rheumatoid arthritis presents to the clinic with complaints of cough and congestion.  Symptoms have been present x1 day. Cough is dry and nonproductive. He has an irritation in his throat, which triggers his cough.  He has been taking Zyrtec, Dynamist, and Pepcid AC with no improvement. Denies fever, SOB, sick contacts, and recent travel.  Received the COVID-19 does to the Eric vaccine x1.    Cough  This is a new problem. The current episode started yesterday. The problem has been unchanged. The problem occurs every few minutes. The cough is Non-productive. Associated symptoms include nasal congestion and postnasal drip. Pertinent negatives include no chest pain, chills, ear congestion, ear pain, fever, headaches, heartburn, hemoptysis, myalgias, rash, rhinorrhea, sore throat, shortness of breath, sweats, weight loss or wheezing. Exacerbated by: Irritation in throat. Treatments tried: Zyrtec and Dynamist. The treatment provided no relief. His past medical history is significant for asthma.     Family History   Problem Relation Age of Onset    Cancer Mother         cervical    Heart disease Mother     Hypertension Mother     Stroke Father     Cancer Maternal Grandmother         bladder    Macular degeneration Paternal Grandmother     Cancer Paternal Grandfather         skin    Alzheimer's disease Paternal Grandfather       Social History     Socioeconomic History    Marital status: Single   Tobacco Use    Smoking status: Never    Smokeless tobacco: Never   Substance and Sexual Activity    Alcohol use: Yes     Comment: socially    Drug use: No    Sexual activity: Not Currently     Social Determinants of Health     Physical Activity: Insufficiently Active    Days of Exercise  "per Week: 3 days    Minutes of Exercise per Session: 20 min   Stress: Stress Concern Present    Feeling of Stress : To some extent     Current Outpatient Medications   Medication Sig Dispense Refill    acetaminophen (TYLENOL) 500 MG tablet Take 500 mg by mouth every 6 (six) hours as needed for Pain.      azelastine-fluticasone (DYMISTA) 137-50 mcg/spray Spry nassal spray Use 1 spray(s) in each nostril twice daily 23 g 5    BD LUER-KRISTIE SYRINGE 3 mL 21 gauge x 1 1/2" Syrg SMARTSIG:Injection Every 2 Weeks      BD REGULAR BEVEL NEEDLES 18 gauge x 1 1/2" Ndle USE NEEDLES TO DRAW UP TESTOSTERONE EVERY 2 WEEKS      buPROPion (WELLBUTRIN SR) 150 MG TBSR 12 hr tablet Take 1 tablet (150 mg total) by mouth 2 (two) times daily. 180 tablet 3    carvediloL (COREG) 25 MG tablet Take 25 mg by mouth 2 (two) times daily.      cetirizine (ZYRTEC) 10 MG tablet Take 10 mg by mouth 2 (two) times daily.      diflunisaL (DOLOBID) 500 mg Tab TAKE 1/2 TO 1 (ONE-HALF TO ONE) TABLET BY MOUTH 2 TO 3 TIMES DAILY AFTER A MEAL AS NEEDED 270 tablet 1    EPINEPHrine (EPIPEN) 0.3 mg/0.3 mL AtIn Inject contents of epi pen at first sign of severe allergic reaction or anaphylaxis. 2 each 1    ergocalciferol (ERGOCALCIFEROL) 50,000 unit Cap Take 50,000 Units by mouth every 7 days.      ezetimibe (ZETIA) 10 mg tablet Take 10 mg by mouth once daily.  5    famotidine (PEPCID) 20 MG tablet Take 20 mg by mouth 2 (two) times daily.      hydrOXYchloroQUINE (PLAQUENIL) 200 mg tablet Take 1 tablet (200 mg total) by mouth once daily. 180 tablet 1    irbesartan (AVAPRO) 75 MG tablet Take 75 mg by mouth once daily.      levothyroxine (SYNTHROID) 75 MCG tablet Take 75 mcg by mouth once daily.      meloxicam (MOBIC) 15 MG tablet       methocarbamoL (ROBAXIN) 500 MG Tab Take 500 mg by mouth 3 (three) times daily.      olopatadine (PATADAY) 0.2 % Drop INSTILL 1 DROP INTO EACH EYE ONCE DAILY 2.5 mL 5    oxyCODONE-acetaminophen (PERCOCET) 7.5-325 mg per tablet Take 1 " tablet by mouth 4 (four) times daily as needed.      pantoprazole (PROTONIX) 40 MG tablet Take 1 tablet by mouth once daily 90 tablet 0    potassium chloride SA (K-DUR,KLOR-CON) 20 MEQ tablet Take 20 mEq by mouth as needed.       pravastatin (PRAVACHOL) 20 MG tablet Take 20 mg by mouth once daily.      testosterone cypionate (DEPOTESTOTERONE CYPIONATE) 200 mg/mL injection Inject 200 mg into the muscle every 14 (fourteen) days. 05mL every 2 weeks      traMADoL (ULTRAM) 50 mg tablet TAKE 1 TO 2 TABLETS BY MOUTH TWICE DAILY AS DIRECTED      VYVANSE 50 mg capsule TAKE 1 CAPSULE BY MOUTH ONCE DAILY 30 capsule 0    benzonatate (TESSALON) 200 MG capsule Take 1 capsule (200 mg total) by mouth 3 (three) times daily as needed for Cough. 30 capsule 0     No current facility-administered medications for this visit.     Review of patient's allergies indicates:   Allergen Reactions    Doxycycline      Makes my throat swell    Lyrica [pregabalin]      Memory loss    Micardis [telmisartan] Other (See Comments)     cough    Promethazine Other (See Comments)     Makes my skin crawl      Tetracyclines Other (See Comments) and Swelling      Past Medical History:   Diagnosis Date    ADD (attention deficit disorder)     Anxiety     Asthma     intermittent    Cardiomyopathy     Idiopathic    CHF (congestive heart failure)     Hyperlipidemia     Hypertension     Low testosterone     Rheumatoid arthritis, unspecified     Sleep apnea      Past Surgical History:   Procedure Laterality Date    CARDIAC CATHETERIZATION      left arm surgery      FB removal       Review of Systems   Constitutional:  Negative for activity change, appetite change, chills, diaphoresis, fatigue, fever, unexpected weight change and weight loss.   HENT:  Positive for congestion and postnasal drip. Negative for ear pain, rhinorrhea, sinus pressure, sore throat, trouble swallowing and voice change.    Eyes:  Negative for pain, discharge and visual disturbance.  "  Respiratory:  Positive for cough. Negative for hemoptysis, chest tightness, shortness of breath and wheezing.    Cardiovascular:  Negative for chest pain and palpitations.   Gastrointestinal:  Negative for abdominal pain, constipation, diarrhea, heartburn, nausea and vomiting.   Genitourinary:  Negative for difficulty urinating, flank pain, frequency and urgency.   Musculoskeletal:  Negative for back pain, joint swelling and myalgias.   Skin:  Negative for color change and rash.   Neurological:  Negative for dizziness, seizures, syncope, weakness, numbness and headaches.   Hematological:  Negative for adenopathy.   Psychiatric/Behavioral:  Negative for dysphoric mood and sleep disturbance. The patient is not nervous/anxious.     OBJECTIVE:      Vitals:    09/13/22 1429   BP: 132/80   Pulse: 96   Resp: 18   Temp: 98.5 °F (36.9 °C)   SpO2: 98%   Weight: 110.3 kg (243 lb 3.2 oz)   Height: 5' 10" (1.778 m)     Physical Exam  Vitals and nursing note reviewed.   Constitutional:       General: He is awake. He is not in acute distress.     Appearance: Normal appearance. He is obese. He is not ill-appearing, toxic-appearing or diaphoretic.   HENT:      Head: Normocephalic and atraumatic.      Right Ear: Tympanic membrane, ear canal and external ear normal. There is no impacted cerumen.      Left Ear: Tympanic membrane, ear canal and external ear normal. There is no impacted cerumen.      Nose: Congestion present.      Mouth/Throat:      Comments: Wearing mask  Eyes:      General: Lids are normal. Gaze aligned appropriately.      Conjunctiva/sclera: Conjunctivae normal.      Right eye: Right conjunctiva is not injected.      Left eye: Left conjunctiva is not injected.      Pupils: Pupils are equal, round, and reactive to light.   Cardiovascular:      Rate and Rhythm: Normal rate and regular rhythm.      Pulses: Normal pulses.      Heart sounds: Normal heart sounds, S1 normal and S2 normal. No murmur heard.    No friction " rub. No gallop.   Pulmonary:      Effort: Pulmonary effort is normal. No respiratory distress.      Breath sounds: Normal breath sounds. No stridor. No decreased breath sounds, wheezing, rhonchi or rales.   Chest:      Chest wall: No tenderness.   Musculoskeletal:      Cervical back: Neck supple.      Right lower leg: No edema.      Left lower leg: No edema.   Lymphadenopathy:      Cervical: No cervical adenopathy.   Skin:     General: Skin is warm and dry.      Capillary Refill: Capillary refill takes less than 2 seconds.      Findings: No erythema or rash.   Neurological:      Mental Status: He is alert and oriented to person, place, and time. Mental status is at baseline.   Psychiatric:         Attention and Perception: Attention normal.         Mood and Affect: Mood normal.         Speech: Speech normal.         Behavior: Behavior normal. Behavior is cooperative.         Thought Content: Thought content normal.         Judgment: Judgment normal.      Office Visit on 09/13/2022   Component Date Value Ref Range Status    Rapid Influenza A Ag 09/13/2022 Negative  Negative Final    Rapid Influenza B Ag 09/13/2022 Negative  Negative Final     Acceptable 09/13/2022 Yes   Final    POC Rapid COVID 09/13/2022 Negative  Negative Final     Acceptable 09/13/2022 Yes   Final     Assessment:       1. Cough    2. Nasal congestion        Plan:       Cough  Negative for influenza and COVID-19.  Symptoms are likely viral.  Discussed retesting for COVID-19 in 2-3 days.  Symptomatic care discussed.  Tessalon prn or Mucinex DM.  Notify clinic if symptoms worsen or fail improve by Friday.   -     POCT Influenza A/B  -     POCT COVID-19 Rapid Screening  -     benzonatate (TESSALON) 200 MG capsule; Take 1 capsule (200 mg total) by mouth 3 (three) times daily as needed for Cough.  Dispense: 30 capsule; Refill: 0    Nasal congestion  Continue Zyrtec and Dynamist.     This note was created using MModal voice  recognition software that occasionally misinterprets phrases or words.     Follow up if symptoms worsen or fail to improve.      9/13/2022 BETY Biggs, FNP

## 2022-09-16 ENCOUNTER — PATIENT MESSAGE (OUTPATIENT)
Dept: FAMILY MEDICINE | Facility: CLINIC | Age: 52
End: 2022-09-16

## 2022-09-16 NOTE — TELEPHONE ENCOUNTER
Patient states she needs the abx you discussed. Her cough is worse, deanna perhls are not working, she is still negative for Covid.

## 2022-09-19 ENCOUNTER — OFFICE VISIT (OUTPATIENT)
Dept: FAMILY MEDICINE | Facility: CLINIC | Age: 52
End: 2022-09-19
Payer: MEDICARE

## 2022-09-19 DIAGNOSIS — F90.9 ATTENTION DEFICIT HYPERACTIVITY DISORDER (ADHD), UNSPECIFIED ADHD TYPE: ICD-10-CM

## 2022-09-19 DIAGNOSIS — J06.9 UPPER RESPIRATORY TRACT INFECTION, UNSPECIFIED TYPE: Primary | ICD-10-CM

## 2022-09-19 DIAGNOSIS — F34.1 DYSTHYMIA: Primary | ICD-10-CM

## 2022-09-19 DIAGNOSIS — R05.9 COUGH: ICD-10-CM

## 2022-09-19 PROCEDURE — 99214 PR OFFICE/OUTPT VISIT, EST, LEVL IV, 30-39 MIN: ICD-10-PCS | Mod: 95,,, | Performed by: FAMILY MEDICINE

## 2022-09-19 PROCEDURE — 99214 OFFICE O/P EST MOD 30 MIN: CPT | Mod: 95,,, | Performed by: FAMILY MEDICINE

## 2022-09-19 RX ORDER — METHYLPREDNISOLONE 4 MG/1
TABLET ORAL
Qty: 1 EACH | Refills: 0 | Status: SHIPPED | OUTPATIENT
Start: 2022-09-19 | End: 2022-10-10

## 2022-09-19 RX ORDER — AZITHROMYCIN 250 MG/1
TABLET, FILM COATED ORAL
Qty: 6 TABLET | Refills: 0 | Status: SHIPPED | OUTPATIENT
Start: 2022-09-19 | End: 2022-09-24

## 2022-09-19 RX ORDER — CODEINE PHOSPHATE AND GUAIFENESIN 10; 100 MG/5ML; MG/5ML
10 SOLUTION ORAL 3 TIMES DAILY PRN
Qty: 237 ML | Refills: 0 | Status: SHIPPED | OUTPATIENT
Start: 2022-09-19 | End: 2022-09-26 | Stop reason: SDUPTHER

## 2022-09-19 NOTE — PROGRESS NOTES
"    Subjective:        The chief complaint leading to consultation is: follow up adhd, mood. Also sick.   The patient location is:  Home  Visit type: This was a video visit in lieu of in-person visit due to the coronavirus emergency. Patient acknowledged and consented to the video visit encounter.     HPI  Bharath Parr is a 51 y.o. male with a hx of Dilated Cardiomyopathy (EF 45%, followed by Dr. Taylor), HTN, HLD, Hypothyroid, GERD, Obesity, ADHD, SONYA, Fibromyalgia (Dr lAmendarez), and Chronic pain (managed by Dr Frazier). He is being seen for scheduled follow up on ADHD and Dysthymia.     He reports that he is doing okay regarding his mood despite the fact that his best friend recently  of a drug overdose. Feels he is "staying strong" for his family at this time and coping well. Regarding his ADHD, he continues to do well on his long-term dose of Vyvanse, which is neither affecting his appetite, his sleep, or producing palpitations. Denies CP.    The patient was seen in clinic for an acute visit 6 days ago and dx'd w URI (negative for Flu and Covid), rx'd Tessalon Perles and asked to continue Zyrtec and Dynamist. Today, he reports that he has been using all the indicated meds, but his cough has not improved. Would welcome a cough syrup. Allergic to Promethazine. Of note, he had sent a message via portal and MAN Ramos also sent in Zpack and Medrol dose pack to his pharmacy earlier today.      Past Surgical History:   Procedure Laterality Date    CARDIAC CATHETERIZATION      left arm surgery      FB removal     Past Medical History:   Diagnosis Date    ADD (attention deficit disorder)     Anxiety     Asthma     intermittent    Cardiomyopathy     Idiopathic    CHF (congestive heart failure)     Hyperlipidemia     Hypertension     Low testosterone     Rheumatoid arthritis, unspecified     Sleep apnea      Family History   Problem Relation Age of Onset    Cancer Mother         cervical    Heart disease " "Mother     Hypertension Mother     Stroke Father     Cancer Maternal Grandmother         bladder    Macular degeneration Paternal Grandmother     Cancer Paternal Grandfather         skin    Alzheimer's disease Paternal Grandfather         Social History:   Marital Status: Single  Alcohol History:  reports current alcohol use.  Tobacco History:  reports that he has never smoked. He has never used smokeless tobacco.  Drug History:  reports no history of drug use.    Review of patient's allergies indicates:   Allergen Reactions    Doxycycline      Makes my throat swell    Lyrica [pregabalin]      Memory loss    Micardis [telmisartan] Other (See Comments)     cough    Promethazine Other (See Comments)     Makes my skin crawl      Tetracyclines Other (See Comments) and Swelling       Current Outpatient Medications   Medication Sig Dispense Refill    acetaminophen (TYLENOL) 500 MG tablet Take 500 mg by mouth every 6 (six) hours as needed for Pain.      azelastine-fluticasone (DYMISTA) 137-50 mcg/spray Spry nassal spray Use 1 spray(s) in each nostril twice daily 23 g 5    azithromycin (Z-MONIE) 250 MG tablet Take 2 tablets by mouth on day 1; Take 1 tablet by mouth on days 2-5 6 tablet 0    BD LUER-KRSITIE SYRINGE 3 mL 21 gauge x 1 1/2" Syrg SMARTSIG:Injection Every 2 Weeks      BD REGULAR BEVEL NEEDLES 18 gauge x 1 1/2" Ndle USE NEEDLES TO DRAW UP TESTOSTERONE EVERY 2 WEEKS      benzonatate (TESSALON) 200 MG capsule Take 1 capsule (200 mg total) by mouth 3 (three) times daily as needed for Cough. 30 capsule 0    buPROPion (WELLBUTRIN SR) 150 MG TBSR 12 hr tablet Take 1 tablet (150 mg total) by mouth 2 (two) times daily. 180 tablet 3    carvediloL (COREG) 25 MG tablet Take 25 mg by mouth 2 (two) times daily.      cetirizine (ZYRTEC) 10 MG tablet Take 10 mg by mouth 2 (two) times daily.      diflunisaL (DOLOBID) 500 mg Tab TAKE 1/2 TO 1 (ONE-HALF TO ONE) TABLET BY MOUTH 2 TO 3 TIMES DAILY AFTER A MEAL AS NEEDED 270 tablet 1    " EPINEPHrine (EPIPEN) 0.3 mg/0.3 mL AtIn Inject contents of epi pen at first sign of severe allergic reaction or anaphylaxis. 2 each 1    ergocalciferol (ERGOCALCIFEROL) 50,000 unit Cap Take 50,000 Units by mouth every 7 days.      ezetimibe (ZETIA) 10 mg tablet Take 10 mg by mouth once daily.  5    famotidine (PEPCID) 20 MG tablet Take 20 mg by mouth 2 (two) times daily.      guaiFENesin-codeine 100-10 mg/5 ml (TUSSI-ORGANIDIN NR)  mg/5 mL syrup Take 10 mLs by mouth 3 (three) times daily as needed for Cough. 237 mL 0    hydrOXYchloroQUINE (PLAQUENIL) 200 mg tablet Take 1 tablet (200 mg total) by mouth once daily. 180 tablet 1    irbesartan (AVAPRO) 75 MG tablet Take 75 mg by mouth once daily.      levothyroxine (SYNTHROID) 75 MCG tablet Take 75 mcg by mouth once daily.      meloxicam (MOBIC) 15 MG tablet       methocarbamoL (ROBAXIN) 500 MG Tab Take 500 mg by mouth 3 (three) times daily.      methylPREDNISolone (MEDROL DOSEPACK) 4 mg tablet use as directed 1 each 0    olopatadine (PATADAY) 0.2 % Drop INSTILL 1 DROP INTO EACH EYE ONCE DAILY 2.5 mL 5    oxyCODONE-acetaminophen (PERCOCET) 7.5-325 mg per tablet Take 1 tablet by mouth 4 (four) times daily as needed.      pantoprazole (PROTONIX) 40 MG tablet Take 1 tablet by mouth once daily 90 tablet 0    potassium chloride SA (K-DUR,KLOR-CON) 20 MEQ tablet Take 20 mEq by mouth as needed.       pravastatin (PRAVACHOL) 20 MG tablet Take 20 mg by mouth once daily.      testosterone cypionate (DEPOTESTOTERONE CYPIONATE) 200 mg/mL injection Inject 200 mg into the muscle every 14 (fourteen) days. 05mL every 2 weeks      traMADoL (ULTRAM) 50 mg tablet TAKE 1 TO 2 TABLETS BY MOUTH TWICE DAILY AS DIRECTED      VYVANSE 50 mg capsule TAKE 1 CAPSULE BY MOUTH ONCE DAILY 30 capsule 0     No current facility-administered medications for this visit.       Review of Systems    As in hpi    Objective:        Physical Exam:   Physical Exam  Limited exam d/t virtual appt.  NAD.  Appears ill but not toxic.  +frequent cough  AAOx3  Normal breathing effort  Normal affect and behavior         Assessment:       1. Dysthymia    2. Cough    3. Attention deficit hyperactivity disorder (ADHD), unspecified ADHD type      Plan:   Dysthymia    Cough  -     guaiFENesin-codeine 100-10 mg/5 ml (TUSSI-ORGANIDIN NR)  mg/5 mL syrup; Take 10 mLs by mouth 3 (three) times daily as needed for Cough.  Dispense: 237 mL; Refill: 0    Attention deficit hyperactivity disorder (ADHD), unspecified ADHD type    -Dysthymia continues to be well controlled on current regimen. Continue. Discussed monitoring for signs and symptoms of grief reaction, normalized. RTC prn.  -ADHD stable. Continue regimen.  -Rx'd Guifenesin-Codeine for cough given allergy to promethazine. Explicitly discussed this as an added narcotic to his chronic pain meds. Pt states he is only taking half tab Percocet at HS (rx'd as 1 tab QID), and managing his pain with Tramadol TID otherwise. He verbalized understanding about the dangers of mixing and/or increasing doses and agrees to use caution.      No follow-ups on file.      Each patient to whom medical services are provided by telemedicine is:  (1) informed of the relationship between the physician and patient and the respective role of any other health care provider with respect to management of the patient; and (2) notified that he or she may decline to receive medical services by telemedicine and may withdraw from such care at any time.

## 2022-09-19 NOTE — TELEPHONE ENCOUNTER
Ramon Ochoa, I got this message from my patient. Initially a bit confusing since it was apparently forwarded to Dr Gabriel, but looks like you saw him for an acute visit. Any input on his request?

## 2022-09-21 PROBLEM — M25.60 DECREASED RANGE OF MOTION: Status: RESOLVED | Noted: 2021-09-30 | Resolved: 2022-09-21

## 2022-09-21 PROBLEM — R53.1 DECREASED STRENGTH: Status: RESOLVED | Noted: 2021-09-30 | Resolved: 2022-09-21

## 2022-09-25 ENCOUNTER — PATIENT MESSAGE (OUTPATIENT)
Dept: FAMILY MEDICINE | Facility: CLINIC | Age: 52
End: 2022-09-25

## 2022-09-26 ENCOUNTER — HOSPITAL ENCOUNTER (OUTPATIENT)
Dept: RADIOLOGY | Facility: HOSPITAL | Age: 52
Discharge: HOME | End: 2022-09-26
Attending: FAMILY MEDICINE
Payer: MEDICARE

## 2022-09-26 ENCOUNTER — OFFICE VISIT (OUTPATIENT)
Dept: FAMILY MEDICINE | Facility: CLINIC | Age: 52
End: 2022-09-26
Payer: MEDICARE

## 2022-09-26 VITALS
BODY MASS INDEX: 34.22 KG/M2 | DIASTOLIC BLOOD PRESSURE: 78 MMHG | WEIGHT: 239 LBS | HEIGHT: 70 IN | RESPIRATION RATE: 18 BRPM | SYSTOLIC BLOOD PRESSURE: 118 MMHG | HEART RATE: 99 BPM | TEMPERATURE: 99 F | OXYGEN SATURATION: 98 %

## 2022-09-26 DIAGNOSIS — R50.9 FEVER, UNSPECIFIED FEVER CAUSE: Primary | ICD-10-CM

## 2022-09-26 DIAGNOSIS — R05.9 COUGH: ICD-10-CM

## 2022-09-26 PROCEDURE — 71046 X-RAY EXAM CHEST 2 VIEWS: CPT | Mod: TC

## 2022-09-26 PROCEDURE — 99213 PR OFFICE/OUTPT VISIT, EST, LEVL III, 20-29 MIN: ICD-10-PCS | Mod: S$PBB,AQ,, | Performed by: FAMILY MEDICINE

## 2022-09-26 PROCEDURE — 99215 OFFICE O/P EST HI 40 MIN: CPT | Mod: 25 | Performed by: FAMILY MEDICINE

## 2022-09-26 PROCEDURE — 99213 OFFICE O/P EST LOW 20 MIN: CPT | Mod: S$PBB,AQ,, | Performed by: FAMILY MEDICINE

## 2022-09-26 RX ORDER — CODEINE PHOSPHATE AND GUAIFENESIN 10; 100 MG/5ML; MG/5ML
10 SOLUTION ORAL 3 TIMES DAILY PRN
Qty: 237 ML | Refills: 0 | Status: SHIPPED | OUTPATIENT
Start: 2022-09-26 | End: 2022-10-03 | Stop reason: SDUPTHER

## 2022-09-26 RX ORDER — AMOXICILLIN AND CLAVULANATE POTASSIUM 875; 125 MG/1; MG/1
1 TABLET, FILM COATED ORAL EVERY 12 HOURS
Qty: 14 TABLET | Refills: 0 | Status: SHIPPED | OUTPATIENT
Start: 2022-09-26 | End: 2022-10-03

## 2022-09-26 NOTE — PROGRESS NOTES
"  SUBJECTIVE:   HPI:  Cough (Still coughing)    HPI  Bharath Parr is a 51 y.o. male who comes in for acute visit c/o continued cough.    He was initially seen 2 weeks ago with cough and nasal congestion, tested negative for Covid and Flu, treated with Mucinex DM and Tessalon Perles and asked to continue Zyrtec and Dynamist. He then followed up 1 week ago with continued symptoms. At that time, the cough was moderate-severe and it was not responding to the treatment. He was then prescribed Azithromycin, a Medrol dose pack, and Guaifenesin-Codeine. Today, he reports that his cough is still present. Got better and then worse. Dry. No head congestion, but runny nose. Some hoarseness. No wheezing. No chest congestion. Night sweats. No chills. Had 4-y cough after Chanelle, never found out reason. ?Allergy to mold. Sinus surgery made it go away. Thick clear mucus. Cough syrup has helped some.     Of note, patient is on Hydroxychloroquine.    Review of patient's allergies indicates:   Allergen Reactions    Doxycycline      Makes my throat swell    Lyrica [pregabalin]      Memory loss    Micardis [telmisartan] Other (See Comments)     cough    Promethazine Other (See Comments)     Makes my skin crawl      Tetracyclines Other (See Comments) and Swelling       Current Outpatient Medications on File Prior to Visit   Medication Sig Dispense Refill    acetaminophen (TYLENOL) 500 MG tablet Take 500 mg by mouth every 6 (six) hours as needed for Pain.      azelastine-fluticasone (DYMISTA) 137-50 mcg/spray Spry nassal spray Use 1 spray(s) in each nostril twice daily 23 g 5    BD LUER-KRISTIE SYRINGE 3 mL 21 gauge x 1 1/2" Syrg SMARTSIG:Injection Every 2 Weeks      BD REGULAR BEVEL NEEDLES 18 gauge x 1 1/2" Ndle USE NEEDLES TO DRAW UP TESTOSTERONE EVERY 2 WEEKS      buPROPion (WELLBUTRIN SR) 150 MG TBSR 12 hr tablet Take 1 tablet (150 mg total) by mouth 2 (two) times daily. 180 tablet 3    carvediloL (COREG) 25 MG tablet Take 25 mg by " mouth 2 (two) times daily.      cetirizine (ZYRTEC) 10 MG tablet Take 10 mg by mouth 2 (two) times daily.      diflunisaL (DOLOBID) 500 mg Tab TAKE 1/2 TO 1 (ONE-HALF TO ONE) TABLET BY MOUTH 2 TO 3 TIMES DAILY AFTER A MEAL AS NEEDED 270 tablet 1    EPINEPHrine (EPIPEN) 0.3 mg/0.3 mL AtIn Inject contents of epi pen at first sign of severe allergic reaction or anaphylaxis. 2 each 1    ergocalciferol (ERGOCALCIFEROL) 50,000 unit Cap Take 50,000 Units by mouth every 7 days.      ezetimibe (ZETIA) 10 mg tablet Take 10 mg by mouth once daily.  5    famotidine (PEPCID) 20 MG tablet Take 20 mg by mouth 2 (two) times daily.      hydrOXYchloroQUINE (PLAQUENIL) 200 mg tablet Take 1 tablet (200 mg total) by mouth once daily. 180 tablet 1    irbesartan (AVAPRO) 75 MG tablet Take 75 mg by mouth once daily.      levothyroxine (SYNTHROID) 75 MCG tablet Take 75 mcg by mouth once daily.      methylPREDNISolone (MEDROL DOSEPACK) 4 mg tablet use as directed 1 each 0    olopatadine (PATADAY) 0.2 % Drop INSTILL 1 DROP INTO EACH EYE ONCE DAILY 2.5 mL 5    oxyCODONE-acetaminophen (PERCOCET) 7.5-325 mg per tablet Take 1 tablet by mouth 4 (four) times daily as needed.      pantoprazole (PROTONIX) 40 MG tablet Take 1 tablet by mouth once daily 90 tablet 0    potassium chloride SA (K-DUR,KLOR-CON) 20 MEQ tablet Take 20 mEq by mouth as needed.       pravastatin (PRAVACHOL) 20 MG tablet Take 20 mg by mouth once daily.      testosterone cypionate (DEPOTESTOTERONE CYPIONATE) 200 mg/mL injection Inject 200 mg into the muscle every 14 (fourteen) days. 05mL every 2 weeks      traMADoL (ULTRAM) 50 mg tablet TAKE 1 TO 2 TABLETS BY MOUTH TWICE DAILY AS DIRECTED      VYVANSE 50 mg capsule TAKE 1 CAPSULE BY MOUTH ONCE DAILY 30 capsule 0    meloxicam (MOBIC) 15 MG tablet       methocarbamoL (ROBAXIN) 500 MG Tab Take 500 mg by mouth 3 (three) times daily.       No current facility-administered medications on file prior to visit.       Past Medical History:  "  Diagnosis Date    ADD (attention deficit disorder)     Anxiety     Asthma     intermittent    Cardiomyopathy     Idiopathic    CHF (congestive heart failure)     Hyperlipidemia     Hypertension     Low testosterone     Rheumatoid arthritis, unspecified     Sleep apnea      Past Surgical History:   Procedure Laterality Date    CARDIAC CATHETERIZATION      left arm surgery      FB removal     Family History   Problem Relation Age of Onset    Cancer Mother         cervical    Heart disease Mother     Hypertension Mother     Stroke Father     Cancer Maternal Grandmother         bladder    Macular degeneration Paternal Grandmother     Cancer Paternal Grandfather         skin    Alzheimer's disease Paternal Grandfather        Review of Systems  As in HPI         OBJECTIVE:      Vitals:    09/26/22 1255   BP: 118/78   BP Location: Left arm   Patient Position: Sitting   BP Method: Large (Manual)   Pulse: 99   Resp: 18   Temp: 99.2 °F (37.3 °C)   SpO2: 98%   Weight: 108.4 kg (239 lb)   Height: 5' 10" (1.778 m)     Physical Exam  Vitals reviewed.   Constitutional:       Appearance: He is ill-appearing and diaphoretic. He is not toxic-appearing.   HENT:      Right Ear: Tympanic membrane, ear canal and external ear normal.      Left Ear: Tympanic membrane, ear canal and external ear normal.      Nose: Congestion and rhinorrhea present.      Mouth/Throat:      Pharynx: Posterior oropharyngeal erythema present. No oropharyngeal exudate.   Eyes:      Conjunctiva/sclera: Conjunctivae normal.   Cardiovascular:      Rate and Rhythm: Normal rate and regular rhythm.      Pulses: Normal pulses.      Heart sounds: Normal heart sounds.   Pulmonary:      Effort: Pulmonary effort is normal. No respiratory distress.      Breath sounds: Normal breath sounds. No wheezing, rhonchi or rales.   Musculoskeletal:      Cervical back: Neck supple.   Lymphadenopathy:      Cervical: Cervical adenopathy present.   Neurological:      General: No focal " deficit present.      Mental Status: He is alert and oriented to person, place, and time.   Psychiatric:         Mood and Affect: Mood normal.         Behavior: Behavior normal.        Assessment:       ICD-10-CM ICD-9-CM    1. Fever, unspecified fever cause  R50.9 780.60 CBC auto differential      2. Cough  R05.9 786.2 guaiFENesin-codeine 100-10 mg/5 ml (TUSSI-ORGANIDIN NR)  mg/5 mL syrup      X-Ray Chest PA And Lateral      CBC auto differential           Plan:   Fever, unspecified fever cause  -     CBC auto differential; Future; Expected date: 09/26/2022    Cough  -     guaiFENesin-codeine 100-10 mg/5 ml (TUSSI-ORGANIDIN NR)  mg/5 mL syrup; Take 10 mLs by mouth 3 (three) times daily as needed for Cough.  Dispense: 237 mL; Refill: 0  -     X-Ray Chest PA And Lateral; Future; Expected date: 09/26/2022  -     CBC auto differential; Future; Expected date: 09/26/2022    - Will send for CXR and CBC given duration of illness. Treat pending results. Refill cough syrup x 1.    No follow-ups on file.      9/27/2022 Josefina Strong M.D.

## 2022-10-03 ENCOUNTER — CLINICAL SUPPORT (OUTPATIENT)
Dept: FAMILY MEDICINE | Facility: CLINIC | Age: 52
End: 2022-10-03
Payer: MEDICARE

## 2022-10-03 VITALS — TEMPERATURE: 98 F

## 2022-10-03 DIAGNOSIS — R05.2 SUBACUTE COUGH: Primary | ICD-10-CM

## 2022-10-03 DIAGNOSIS — R05.9 COUGH: ICD-10-CM

## 2022-10-03 DIAGNOSIS — R50.9 FEVER, UNSPECIFIED FEVER CAUSE: ICD-10-CM

## 2022-10-03 PROCEDURE — 87070 CULTURE OTHR SPECIMN AEROBIC: CPT | Performed by: FAMILY MEDICINE

## 2022-10-03 RX ORDER — CODEINE PHOSPHATE AND GUAIFENESIN 10; 100 MG/5ML; MG/5ML
10 SOLUTION ORAL 3 TIMES DAILY PRN
Qty: 473 ML | Refills: 0 | Status: SHIPPED | OUTPATIENT
Start: 2022-10-03 | End: 2022-10-03

## 2022-10-03 RX ORDER — CODEINE PHOSPHATE AND GUAIFENESIN 10; 100 MG/5ML; MG/5ML
10 SOLUTION ORAL 3 TIMES DAILY PRN
Qty: 473 ML | Refills: 0 | Status: SHIPPED | OUTPATIENT
Start: 2022-10-03 | End: 2022-10-20 | Stop reason: ALTCHOICE

## 2022-10-03 NOTE — PROGRESS NOTES
Patient came into clinic for throat swab for culture. Patient tolerated procedure well without any complications. Patient notified once we get the results he will be contacted. Patient voiced understanding.

## 2022-10-05 LAB — BACTERIA THROAT CULT: NORMAL
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
## 2022-10-20 ENCOUNTER — OFFICE VISIT (OUTPATIENT)
Dept: OTOLARYNGOLOGY | Facility: CLINIC | Age: 52
End: 2022-10-20
Payer: MEDICARE

## 2022-10-20 VITALS — RESPIRATION RATE: 18 BRPM | BODY MASS INDEX: 34.65 KG/M2 | HEIGHT: 70 IN | WEIGHT: 242.06 LBS

## 2022-10-20 DIAGNOSIS — H81.10 BENIGN PAROXYSMAL POSITIONAL VERTIGO, UNSPECIFIED LATERALITY: ICD-10-CM

## 2022-10-20 DIAGNOSIS — R05.9 COUGH, UNSPECIFIED TYPE: Primary | ICD-10-CM

## 2022-10-20 PROCEDURE — 99214 OFFICE O/P EST MOD 30 MIN: CPT | Mod: PBBFAC,PO,25 | Performed by: PHYSICIAN ASSISTANT

## 2022-10-20 PROCEDURE — 99203 OFFICE O/P NEW LOW 30 MIN: CPT | Mod: S$PBB,25,, | Performed by: PHYSICIAN ASSISTANT

## 2022-10-20 PROCEDURE — 31575 DIAGNOSTIC LARYNGOSCOPY: CPT | Mod: S$PBB,,, | Performed by: PHYSICIAN ASSISTANT

## 2022-10-20 PROCEDURE — 31575 LARYNGOSCOPY: ICD-10-PCS | Mod: S$PBB,,, | Performed by: PHYSICIAN ASSISTANT

## 2022-10-20 PROCEDURE — 99999 PR PBB SHADOW E&M-EST. PATIENT-LVL IV: CPT | Mod: PBBFAC,,, | Performed by: PHYSICIAN ASSISTANT

## 2022-10-20 PROCEDURE — 99999 PR PBB SHADOW E&M-EST. PATIENT-LVL IV: ICD-10-PCS | Mod: PBBFAC,,, | Performed by: PHYSICIAN ASSISTANT

## 2022-10-20 PROCEDURE — 99203 PR OFFICE/OUTPT VISIT, NEW, LEVL III, 30-44 MIN: ICD-10-PCS | Mod: S$PBB,25,, | Performed by: PHYSICIAN ASSISTANT

## 2022-10-20 PROCEDURE — 31575 DIAGNOSTIC LARYNGOSCOPY: CPT | Mod: PBBFAC,PO | Performed by: PHYSICIAN ASSISTANT

## 2022-10-20 RX ORDER — ACETAMINOPHEN AND CODEINE PHOSPHATE 300; 30 MG/1; MG/1
1 TABLET ORAL 3 TIMES DAILY PRN
COMMUNITY
Start: 2022-10-11

## 2022-10-20 NOTE — PATIENT INSTRUCTIONS
If cough lasts greater than 8 weeks, then see if alternative medication other than irbesartan as sometimes ARBs can cause cough.     I suspect that you have lingering post-viral cough. If cough persists greater than 8 weeks, then follow up with pulmonology.

## 2022-10-26 NOTE — PROCEDURES
Laryngoscopy    Date/Time: 10/20/2022 3:00 PM  Performed by: Enmanuel Le PA-C  Authorized by: Enmanuel Le PA-C     Consent Done?:  Yes (Verbal)  Anesthesia:     Local anesthetic:  Lidocaine 4% and Fuentes-Synephrine 1/2%    Patient tolerance:  Patient tolerated the procedure well with no immediate complications    Decongestion performed?: Yes    Laryngoscopy:     Areas examined:  Nasal cavities, nasopharynx, oropharynx, hypopharynx, larynx and vocal cords    Laryngoscope size: disposable flexible ambu scope.  Nose External:      No external nasal deformity  Nose Intranasal:      Mucosa no polyps     Mucosa ulcers not present     No mucosa lesions present     Septum gross deformity (left nasal septal spur)  Nasopharynx:      No mucosa lesions     Posterior choanae patent     Eustachian tube patent  Larynx/hypopharynx:      No epiglottis lesions     No epiglottis edema     No AE folds lesions     No vocal cord polyps     Equal and normal bilateral     No hypopharynx lesions     No piriform sinus pooling     No piriform sinus lesions     No post cricoid edema     No post cricoid erythema       Open maxillary antrostomy sites bilaterally without purulence.

## 2022-11-01 ENCOUNTER — OFFICE VISIT (OUTPATIENT)
Dept: RHEUMATOLOGY | Facility: CLINIC | Age: 52
End: 2022-11-01
Payer: MEDICARE

## 2022-11-01 ENCOUNTER — LAB VISIT (OUTPATIENT)
Dept: LAB | Facility: HOSPITAL | Age: 52
End: 2022-11-01
Attending: INTERNAL MEDICINE
Payer: MEDICARE

## 2022-11-01 VITALS — DIASTOLIC BLOOD PRESSURE: 84 MMHG | SYSTOLIC BLOOD PRESSURE: 129 MMHG | WEIGHT: 246.69 LBS | BODY MASS INDEX: 35.4 KG/M2

## 2022-11-01 DIAGNOSIS — M06.9 RHEUMATOID ARTHRITIS, INVOLVING UNSPECIFIED SITE, UNSPECIFIED WHETHER RHEUMATOID FACTOR PRESENT: Primary | ICD-10-CM

## 2022-11-01 DIAGNOSIS — M06.9 RHEUMATOID ARTHRITIS, INVOLVING UNSPECIFIED SITE, UNSPECIFIED WHETHER RHEUMATOID FACTOR PRESENT: ICD-10-CM

## 2022-11-01 LAB
ALBUMIN SERPL BCP-MCNC: 3.6 G/DL (ref 3.5–5.2)
ALP SERPL-CCNC: 96 U/L (ref 55–135)
ALT SERPL W/O P-5'-P-CCNC: 13 U/L (ref 10–44)
ANION GAP SERPL CALC-SCNC: 9 MMOL/L (ref 8–16)
AST SERPL-CCNC: 18 U/L (ref 10–40)
BILIRUB SERPL-MCNC: 0.5 MG/DL (ref 0.1–1)
BUN SERPL-MCNC: 10 MG/DL (ref 6–20)
CALCIUM SERPL-MCNC: 8.9 MG/DL (ref 8.7–10.5)
CHLORIDE SERPL-SCNC: 99 MMOL/L (ref 95–110)
CO2 SERPL-SCNC: 26 MMOL/L (ref 23–29)
CREAT SERPL-MCNC: 1.4 MG/DL (ref 0.5–1.4)
CRP SERPL-MCNC: 0.33 MG/DL
EST. GFR  (NO RACE VARIABLE): >60 ML/MIN/1.73 M^2
GLUCOSE SERPL-MCNC: 80 MG/DL (ref 70–110)
POTASSIUM SERPL-SCNC: 3.5 MMOL/L (ref 3.5–5.1)
PROT SERPL-MCNC: 7.6 G/DL (ref 6–8.4)
SODIUM SERPL-SCNC: 134 MMOL/L (ref 136–145)

## 2022-11-01 PROCEDURE — 99213 PR OFFICE/OUTPT VISIT, EST, LEVL III, 20-29 MIN: ICD-10-PCS | Mod: S$GLB,,, | Performed by: INTERNAL MEDICINE

## 2022-11-01 PROCEDURE — 36415 COLL VENOUS BLD VENIPUNCTURE: CPT | Performed by: INTERNAL MEDICINE

## 2022-11-01 PROCEDURE — 86140 C-REACTIVE PROTEIN: CPT | Performed by: INTERNAL MEDICINE

## 2022-11-01 PROCEDURE — 80053 COMPREHEN METABOLIC PANEL: CPT | Performed by: INTERNAL MEDICINE

## 2022-11-01 PROCEDURE — 99213 OFFICE O/P EST LOW 20 MIN: CPT | Mod: S$GLB,,, | Performed by: INTERNAL MEDICINE

## 2022-11-01 RX ORDER — MUPIROCIN 20 MG/G
OINTMENT TOPICAL
COMMUNITY
Start: 2022-10-24

## 2022-11-01 RX ORDER — HYDROXYCHLOROQUINE SULFATE 200 MG/1
200 TABLET, FILM COATED ORAL DAILY
Qty: 180 TABLET | Refills: 1 | Status: SHIPPED | OUTPATIENT
Start: 2022-11-01 | End: 2023-01-30 | Stop reason: SDUPTHER

## 2022-11-01 NOTE — PROGRESS NOTES
[facility] RHEUMATOLOGY            PROGRESS NOTE      Subjective:       Patient ID:   NAME: Bharath Parr : 1970     51 y.o. male    Referring Doc: No ref. provider found  Other Physicians:    Chief Complaint:  Rheumatoid Arthritis      History of Present Illness:     Patient returns today for a regularly scheduled follow-up visit for rheumatoid arthritis    The patient days he feels better since being on Plaquenil.  No fevers or chest pains.  Had recent cough with negative workup  No prolonged morning stiffness severe arthralgias or joint swelling    ROS:   GEN:  No  fever, night sweats . weight is stable   + c  fatigue  SKIN: no rashes, no bruising, no ulcerations, no Raynaud's  HEENT: no HA's, No visual changes, no mucosal ulcers, no sicca symptoms,  CV:   no CP, SOB, PND, SORENSEN, no orthopnea, no palpitations  PULM: normal with no SOB, cough, hemoptysis, sputum or pleuritic pain  GI:  no abdominal pain, nausea, vomiting, constipation, diarrhea, melanotic stools, BRBPR, hematemesis, no dysphag  NEURO: no paresthesias, headaches, visual disturbances, muscle weakness  MUSCULOSKELETAL:no joint swelling, prolonged AM stiffness, no back pain, no muscle pain  Allergies:  Review of patient's allergies indicates:   Allergen Reactions    Doxycycline      Makes my throat swell    Lyrica [pregabalin]      Memory loss    Micardis [telmisartan] Other (See Comments)     cough    Promethazine Other (See Comments)     Makes my skin crawl      Tetracyclines Other (See Comments) and Swelling       Medications:    Current Outpatient Medications:     acetaminophen (TYLENOL) 500 MG tablet, Take 500 mg by mouth every 6 (six) hours as needed for Pain., Disp: , Rfl:     acetaminophen-codeine 300-30mg (TYLENOL #3) 300-30 mg Tab, Take 1 tablet by mouth 3 (three) times daily as needed., Disp: , Rfl:     azelastine-fluticasone (DYMISTA) 137-50 mcg/spray Spry nassal spray, Use 1 spray(s) in each nostril twice daily, Disp: 23 g,  "Rfl: 5    BD LUER-KRISTIE SYRINGE 3 mL 21 gauge x 1 1/2" Syrg, SMARTSIG:Injection Every 2 Weeks, Disp: , Rfl:     BD REGULAR BEVEL NEEDLES 18 gauge x 1 1/2" Ndle, USE NEEDLES TO DRAW UP TESTOSTERONE EVERY 2 WEEKS, Disp: , Rfl:     buPROPion (WELLBUTRIN SR) 150 MG TBSR 12 hr tablet, Take 1 tablet (150 mg total) by mouth 2 (two) times daily., Disp: 180 tablet, Rfl: 3    carvediloL (COREG) 25 MG tablet, Take 25 mg by mouth 2 (two) times daily., Disp: , Rfl:     cetirizine (ZYRTEC) 10 MG tablet, Take 10 mg by mouth 2 (two) times daily., Disp: , Rfl:     diflunisaL (DOLOBID) 500 mg Tab, TAKE 1/2 TO 1 (ONE-HALF TO ONE) TABLET BY MOUTH 2 TO 3 TIMES DAILY AFTER A MEAL AS NEEDED, Disp: 270 tablet, Rfl: 1    EPINEPHrine (EPIPEN) 0.3 mg/0.3 mL AtIn, Inject contents of epi pen at first sign of severe allergic reaction or anaphylaxis., Disp: 2 each, Rfl: 1    ergocalciferol (ERGOCALCIFEROL) 50,000 unit Cap, Take 50,000 Units by mouth every 7 days., Disp: , Rfl:     ezetimibe (ZETIA) 10 mg tablet, Take 10 mg by mouth once daily., Disp: , Rfl: 5    famotidine (PEPCID) 20 MG tablet, Take 20 mg by mouth 2 (two) times daily., Disp: , Rfl:     irbesartan (AVAPRO) 75 MG tablet, Take 75 mg by mouth once daily., Disp: , Rfl:     levothyroxine (SYNTHROID) 75 MCG tablet, Take 75 mcg by mouth once daily., Disp: , Rfl:     mupirocin (BACTROBAN) 2 % ointment, SMARTSI Application Topical 2-3 Times Daily, Disp: , Rfl:     olopatadine (PATADAY) 0.2 % Drop, INSTILL 1 DROP INTO EACH EYE ONCE DAILY, Disp: 2.5 mL, Rfl: 5    oxyCODONE-acetaminophen (PERCOCET) 7.5-325 mg per tablet, Take 1 tablet by mouth 4 (four) times daily as needed., Disp: , Rfl:     pantoprazole (PROTONIX) 40 MG tablet, Take 1 tablet by mouth once daily, Disp: 90 tablet, Rfl: 0    potassium chloride SA (K-DUR,KLOR-CON) 20 MEQ tablet, Take 20 mEq by mouth as needed. , Disp: , Rfl:     pravastatin (PRAVACHOL) 20 MG tablet, Take 20 mg by mouth once daily., Disp: , Rfl:     " testosterone cypionate (DEPOTESTOTERONE CYPIONATE) 200 mg/mL injection, Inject 200 mg into the muscle every 14 (fourteen) days. 05mL every 2 weeks, Disp: , Rfl:     traMADoL (ULTRAM) 50 mg tablet, TAKE 1 TO 2 TABLETS BY MOUTH TWICE DAILY AS DIRECTED, Disp: , Rfl:     VYVANSE 50 mg capsule, TAKE 1 CAPSULE BY MOUTH ONCE DAILY, Disp: 30 capsule, Rfl: 0    hydrOXYchloroQUINE (PLAQUENIL) 200 mg tablet, Take 1 tablet (200 mg total) by mouth once daily., Disp: 180 tablet, Rfl: 1    PMHx/PSHx Updates:      Objective:     Vitals:  Blood pressure 129/84, weight 111.9 kg (246 lb 11.2 oz).    Physical Examination:   GEN: no apparent distress, comfortable; AAOx3  SKIN: no rashes,no ulceration, no Raynaud's, no petechiae, no SQ nodules,  HEAD: normal  EYES: no pallor, no icterus  NECK: no masses, thyroid normal, trachea midline, no LAD/LN's, supple  CV: RRR with no murmur; l S1 and S2 reg. ,no gallop no rubs,   CHEST: Normal respiratory effort; CTAB; normal breath sounds; no wheeze or crackles  MUSC/Skeletal: ROM normal; no crepitus; joints without synovitis,  no deformities  No joint swelling or tenderness of PIP,, wrist, elbow, shoulder, or knee joints.  Very slight tenderness in some MTP and MCP joints without swelling  EXTREM: no clubbing, cyanosis, no edema  NEURO: grossly intact; motor WNL; AAOx3;  PSYCH: normal mood, affect and behavior  LYMPH: normal cervical, supraclavicular          Labs:   Lab Results   Component Value Date    WBC 14.04 (H) 09/26/2022    HGB 16.1 09/26/2022    HCT 52.1 09/26/2022    MCV 94 09/26/2022     09/26/2022    CMP  @LASTLAB(NA,K,CL,CO2,GLU,BUN,Creatinine,Calcium,PROT,Albumin,Bilitot,Alkphos,AST,ALT,CRP,ESR,RF,CCP,JANET,SSA,CPK,uric acid) )@  I have reviewed all available lab results and radiology reports.    Radiology/Diagnostic Studies:        Assessment/Plan:   (1) 51 y.o. male with diagnosis of seropositive rheumatoid arthritis( had synovitis MTP's and MCP's) has improved on Plaquenil  200 mg twice a day.  No erosive changes on imaging studies  History of fibromyalgia.  This is stable  History of dilated cardiomyopathy  History of attention deficit disorder  History of hypertension    Patient will follow-up with [facility]    Discussion:     I have explained all of the above in detail and the patient understands all of the current recommendation(s). I have answered all questions to the best of my ability and to their complete satisfaction.       The patient is to continue with the current management plan         RTC in         Electronically signed by Juanjo Almendarez MD    Patient notified that this office will be closing December 22, 2022. They should begin looking for another rheumatologist as soon as possible.  A list with the names and contact details of rheumatologists in the surrounding area was provided.

## 2022-11-11 ENCOUNTER — OFFICE VISIT (OUTPATIENT)
Dept: FAMILY MEDICINE | Facility: CLINIC | Age: 52
End: 2022-11-11
Payer: MEDICARE

## 2022-11-11 VITALS
SYSTOLIC BLOOD PRESSURE: 122 MMHG | OXYGEN SATURATION: 98 % | HEART RATE: 74 BPM | HEIGHT: 70 IN | BODY MASS INDEX: 35.07 KG/M2 | WEIGHT: 245 LBS | DIASTOLIC BLOOD PRESSURE: 80 MMHG | RESPIRATION RATE: 18 BRPM

## 2022-11-11 DIAGNOSIS — J30.2 SEASONAL ALLERGIES: ICD-10-CM

## 2022-11-11 DIAGNOSIS — Z23 FLU VACCINE NEED: ICD-10-CM

## 2022-11-11 DIAGNOSIS — F90.9 ATTENTION DEFICIT HYPERACTIVITY DISORDER (ADHD), UNSPECIFIED ADHD TYPE: Primary | ICD-10-CM

## 2022-11-11 DIAGNOSIS — F34.1 DYSTHYMIA: ICD-10-CM

## 2022-11-11 PROCEDURE — 99213 OFFICE O/P EST LOW 20 MIN: CPT | Mod: S$PBB,AQ,, | Performed by: FAMILY MEDICINE

## 2022-11-11 PROCEDURE — G0008 ADMIN INFLUENZA VIRUS VAC: HCPCS | Mod: PBBFAC | Performed by: FAMILY MEDICINE

## 2022-11-11 PROCEDURE — 99213 PR OFFICE/OUTPT VISIT, EST, LEVL III, 20-29 MIN: ICD-10-PCS | Mod: S$PBB,AQ,, | Performed by: FAMILY MEDICINE

## 2022-11-11 PROCEDURE — 99213 OFFICE O/P EST LOW 20 MIN: CPT | Performed by: FAMILY MEDICINE

## 2022-11-11 RX ORDER — AZELASTINE HYDROCHLORIDE, FLUTICASONE PROPIONATE 137; 50 UG/1; UG/1
1 SPRAY, METERED NASAL 2 TIMES DAILY
Qty: 23 G | Refills: 5 | Status: SHIPPED | OUTPATIENT
Start: 2022-11-11 | End: 2023-07-14 | Stop reason: SDUPTHER

## 2022-11-11 RX ORDER — LISDEXAMFETAMINE DIMESYLATE 50 MG/1
CAPSULE ORAL
Qty: 30 CAPSULE | Refills: 0 | Status: SHIPPED | OUTPATIENT
Start: 2022-11-11 | End: 2022-12-11 | Stop reason: SDUPTHER

## 2022-11-11 NOTE — PROGRESS NOTES
"  SUBJECTIVE:    Patient ID: Bharath Parr is a 51 y.o. male.    Chief Complaint: Follow-up and ADHD    HPI  Bharath Parr is a 51 y.o. male with a hx of Dilated Cardiomyopathy (EF 45%, followed by Dr. Taylor), HTN, HLD, Hypothyroid, GERD, Obesity, ADHD, SONYA, Fibromyalgia (Dr Almendarez), and Chronic pain (managed by Dr Frazier). He is being seen for scheduled follow up on ADHD and Dysthymia.    He reports that Vyvanse continues to work well at current dose. Denies palpitations, chest pain or pressure, appetite or sleep changes. Mood is "good" with Bupropion working well as well.    Cough - has seen ENT x 2. Improving. If still present in 2 months, will return to specialist.    Would also like refill on Azelastine-Fluticasone.    Lab Visit on 11/01/2022   Component Date Value Ref Range Status    Sodium 11/01/2022 134 (L)  136 - 145 mmol/L Final    Potassium 11/01/2022 3.5  3.5 - 5.1 mmol/L Final    Chloride 11/01/2022 99  95 - 110 mmol/L Final    CO2 11/01/2022 26  23 - 29 mmol/L Final    Glucose 11/01/2022 80  70 - 110 mg/dL Final    BUN 11/01/2022 10  6 - 20 mg/dL Final    Creatinine 11/01/2022 1.4  0.5 - 1.4 mg/dL Final    Calcium 11/01/2022 8.9  8.7 - 10.5 mg/dL Final    Total Protein 11/01/2022 7.6  6.0 - 8.4 g/dL Final    Albumin 11/01/2022 3.6  3.5 - 5.2 g/dL Final    Total Bilirubin 11/01/2022 0.5  0.1 - 1.0 mg/dL Final    Alkaline Phosphatase 11/01/2022 96  55 - 135 U/L Final    AST 11/01/2022 18  10 - 40 U/L Final    ALT 11/01/2022 13  10 - 44 U/L Final    Anion Gap 11/01/2022 9  8 - 16 mmol/L Final    eGFR 11/01/2022 >60.0  >60 mL/min/1.73 m^2 Final    CRP 11/01/2022 0.33  <0.76 mg/dL Final   Clinical Support on 10/03/2022   Component Date Value Ref Range Status    RESPIRATORY CULTURE - THROAT 10/03/2022 Normal respiratory hellen   Final   Lab Visit on 09/26/2022   Component Date Value Ref Range Status    WBC 09/26/2022 14.04 (H)  3.90 - 12.70 K/uL Final    RBC 09/26/2022 5.52  4.60 - 6.20 " M/uL Final    Hemoglobin 09/26/2022 16.1  14.0 - 18.0 g/dL Final    Hematocrit 09/26/2022 52.1  40.0 - 54.0 % Final    MCV 09/26/2022 94  82 - 98 fL Final    MCH 09/26/2022 29.2  27.0 - 31.0 pg Final    MCHC 09/26/2022 30.9 (L)  32.0 - 36.0 g/dL Final    RDW 09/26/2022 13.6  11.5 - 14.5 % Final    Platelets 09/26/2022 272  150 - 450 K/uL Final    MPV 09/26/2022 9.9  9.2 - 12.9 fL Final    Immature Granulocytes 09/26/2022 0.4  0.0 - 0.5 % Final    Gran # (ANC) 09/26/2022 11.5 (H)  1.8 - 7.7 K/uL Final    Immature Grans (Abs) 09/26/2022 0.06 (H)  0.00 - 0.04 K/uL Final    Lymph # 09/26/2022 1.2  1.0 - 4.8 K/uL Final    Mono # 09/26/2022 1.1 (H)  0.3 - 1.0 K/uL Final    Eos # 09/26/2022 0.1  0.0 - 0.5 K/uL Final    Baso # 09/26/2022 0.05  0.00 - 0.20 K/uL Final    nRBC 09/26/2022 0  0 /100 WBC Final    Gran % 09/26/2022 82.2 (H)  38.0 - 73.0 % Final    Lymph % 09/26/2022 8.6 (L)  18.0 - 48.0 % Final    Mono % 09/26/2022 7.7  4.0 - 15.0 % Final    Eosinophil % 09/26/2022 0.7  0.0 - 8.0 % Final    Basophil % 09/26/2022 0.4  0.0 - 1.9 % Final    Differential Method 09/26/2022 Automated   Final   Office Visit on 09/13/2022   Component Date Value Ref Range Status    Rapid Influenza A Ag 09/13/2022 Negative  Negative Final    Rapid Influenza B Ag 09/13/2022 Negative  Negative Final     Acceptable 09/13/2022 Yes   Final    POC Rapid COVID 09/13/2022 Negative  Negative Final     Acceptable 09/13/2022 Yes   Final   Lab Visit on 07/28/2022   Component Date Value Ref Range Status    WBC 07/28/2022 7.55  3.90 - 12.70 K/uL Final    RBC 07/28/2022 5.13  4.60 - 6.20 M/uL Final    Hemoglobin 07/28/2022 15.0  14.0 - 18.0 g/dL Final    Hematocrit 07/28/2022 46.9  40.0 - 54.0 % Final    MCV 07/28/2022 91  82 - 98 fL Final    MCH 07/28/2022 29.2  27.0 - 31.0 pg Final    MCHC 07/28/2022 32.0  32.0 - 36.0 g/dL Final    RDW 07/28/2022 13.7  11.5 - 14.5 % Final    Platelets 07/28/2022 283  150 - 450 K/uL Final     MPV 07/28/2022 9.0 (L)  9.2 - 12.9 fL Final    Immature Granulocytes 07/28/2022 0.3  0.0 - 0.5 % Final    Gran # (ANC) 07/28/2022 5.4  1.8 - 7.7 K/uL Final    Immature Grans (Abs) 07/28/2022 0.02  0.00 - 0.04 K/uL Final    Lymph # 07/28/2022 1.2  1.0 - 4.8 K/uL Final    Mono # 07/28/2022 0.8  0.3 - 1.0 K/uL Final    Eos # 07/28/2022 0.1  0.0 - 0.5 K/uL Final    Baso # 07/28/2022 0.05  0.00 - 0.20 K/uL Final    nRBC 07/28/2022 0  0 /100 WBC Final    Gran % 07/28/2022 70.7  38.0 - 73.0 % Final    Lymph % 07/28/2022 15.5 (L)  18.0 - 48.0 % Final    Mono % 07/28/2022 10.9  4.0 - 15.0 % Final    Eosinophil % 07/28/2022 1.9  0.0 - 8.0 % Final    Basophil % 07/28/2022 0.7  0.0 - 1.9 % Final    Differential Method 07/28/2022 Automated   Final    CRP 07/28/2022 0.65  <0.76 mg/dL Final   Admission on 06/17/2022, Discharged on 06/17/2022   Component Date Value Ref Range Status    WBC 06/17/2022 8.82  3.90 - 12.70 K/uL Final    RBC 06/17/2022 5.07  4.60 - 6.20 M/uL Final    Hemoglobin 06/17/2022 14.6  14.0 - 18.0 g/dL Final    Hematocrit 06/17/2022 45.2  40.0 - 54.0 % Final    MCV 06/17/2022 89  82 - 98 fL Final    MCH 06/17/2022 28.8  27.0 - 31.0 pg Final    MCHC 06/17/2022 32.3  32.0 - 36.0 g/dL Final    RDW 06/17/2022 13.0  11.5 - 14.5 % Final    Platelets 06/17/2022 282  150 - 450 K/uL Final    MPV 06/17/2022 8.6 (L)  9.2 - 12.9 fL Final    Immature Granulocytes 06/17/2022 0.3  0.0 - 0.5 % Final    Gran # (ANC) 06/17/2022 6.6  1.8 - 7.7 K/uL Final    Immature Grans (Abs) 06/17/2022 0.03  0.00 - 0.04 K/uL Final    Lymph # 06/17/2022 1.3  1.0 - 4.8 K/uL Final    Mono # 06/17/2022 0.7  0.3 - 1.0 K/uL Final    Eos # 06/17/2022 0.2  0.0 - 0.5 K/uL Final    Baso # 06/17/2022 0.03  0.00 - 0.20 K/uL Final    nRBC 06/17/2022 0  0 /100 WBC Final    Gran % 06/17/2022 74.3 (H)  38.0 - 73.0 % Final    Lymph % 06/17/2022 15.1 (L)  18.0 - 48.0 % Final    Mono % 06/17/2022 7.8  4.0 - 15.0 % Final    Eosinophil % 06/17/2022 2.2  0.0 - 8.0 %  Final    Basophil % 06/17/2022 0.3  0.0 - 1.9 % Final    Differential Method 06/17/2022 Automated   Final    Sodium 06/17/2022 137  136 - 145 mmol/L Final    Potassium 06/17/2022 4.1  3.5 - 5.1 mmol/L Final    Chloride 06/17/2022 102  95 - 110 mmol/L Final    CO2 06/17/2022 28  23 - 29 mmol/L Final    Glucose 06/17/2022 89  70 - 110 mg/dL Final    BUN 06/17/2022 9  6 - 20 mg/dL Final    Creatinine 06/17/2022 1.1  0.5 - 1.4 mg/dL Final    Calcium 06/17/2022 9.0  8.7 - 10.5 mg/dL Final    Total Protein 06/17/2022 7.8  6.0 - 8.4 g/dL Final    Albumin 06/17/2022 4.3  3.5 - 5.2 g/dL Final    Total Bilirubin 06/17/2022 0.7  0.1 - 1.0 mg/dL Final    Alkaline Phosphatase 06/17/2022 77  55 - 135 U/L Final    AST 06/17/2022 19  10 - 40 U/L Final    ALT 06/17/2022 12  10 - 44 U/L Final    Anion Gap 06/17/2022 7 (L)  8 - 16 mmol/L Final    eGFR if African American 06/17/2022 >60.0  >60 mL/min/1.73 m^2 Final    eGFR if non African American 06/17/2022 >60.0  >60 mL/min/1.73 m^2 Final    Specimen UA 06/17/2022 Urine, Clean Catch   Final    Color, UA 06/17/2022 Yellow  Yellow, Straw, [place] Final    Appearance, UA 06/17/2022 Clear  Clear Final    pH, UA 06/17/2022 6.0  5.0 - 8.0 Final    Specific Gravity, UA 06/17/2022 1.020  1.005 - 1.030 Final    Protein, UA 06/17/2022 Trace (A)  Negative Final    Glucose, UA 06/17/2022 Negative  Negative Final    Ketones, UA 06/17/2022 Negative  Negative Final    Bilirubin (UA) 06/17/2022 Negative  Negative Final    Occult Blood UA 06/17/2022 Negative  Negative Final    Nitrite, UA 06/17/2022 Negative  Negative Final    Urobilinogen, UA 06/17/2022 Negative  Negative EU/dL Final    Leukocytes, UA 06/17/2022 Negative  Negative Final    TSH 06/17/2022 0.690  0.340 - 5.600 uIU/mL Final       Past Medical History:   Diagnosis Date    ADD (attention deficit disorder)     Anxiety     Asthma     intermittent    Cardiomyopathy     Idiopathic    CHF (congestive heart failure)     Hyperlipidemia      "Hypertension     Low testosterone     Rheumatoid arthritis, unspecified     Sleep apnea      Past Surgical History:   Procedure Laterality Date    CARDIAC CATHETERIZATION      left arm surgery      FB removal     Family History   Problem Relation Age of Onset    Cancer Mother         cervical    Heart disease Mother     Hypertension Mother     Stroke Father     Cancer Maternal Grandmother         bladder    Macular degeneration Paternal Grandmother     Cancer Paternal Grandfather         skin    Alzheimer's disease Paternal Grandfather        Tobacco History:  reports that he has never smoked. He has never used smokeless tobacco.  Alcohol History:  reports current alcohol use.  Drug History:  reports no history of drug use.    Review of patient's allergies indicates:   Allergen Reactions    Doxycycline      Makes my throat swell    Lyrica [pregabalin]      Memory loss    Micardis [telmisartan] Other (See Comments)     cough    Promethazine Other (See Comments)     Makes my skin crawl      Tetracyclines Other (See Comments) and Swelling       Current Outpatient Medications:     acetaminophen (TYLENOL) 500 MG tablet, Take 500 mg by mouth every 6 (six) hours as needed for Pain., Disp: , Rfl:     acetaminophen-codeine 300-30mg (TYLENOL #3) 300-30 mg Tab, Take 1 tablet by mouth 3 (three) times daily as needed., Disp: , Rfl:     BD LUER-KRISTIE SYRINGE 3 mL 21 gauge x 1 1/2" Syrg, SMARTSIG:Injection Every 2 Weeks, Disp: , Rfl:     BD REGULAR BEVEL NEEDLES 18 gauge x 1 1/2" Ndle, USE NEEDLES TO DRAW UP TESTOSTERONE EVERY 2 WEEKS, Disp: , Rfl:     buPROPion (WELLBUTRIN SR) 150 MG TBSR 12 hr tablet, Take 1 tablet (150 mg total) by mouth 2 (two) times daily., Disp: 180 tablet, Rfl: 3    carvediloL (COREG) 25 MG tablet, Take 25 mg by mouth 2 (two) times daily., Disp: , Rfl:     cetirizine (ZYRTEC) 10 MG tablet, Take 10 mg by mouth 2 (two) times daily., Disp: , Rfl:     diflunisaL (DOLOBID) 500 mg Tab, TAKE 1/2 TO 1 (ONE-HALF TO " ONE) TABLET BY MOUTH 2 TO 3 TIMES DAILY AFTER A MEAL AS NEEDED, Disp: 270 tablet, Rfl: 1    EPINEPHrine (EPIPEN) 0.3 mg/0.3 mL AtIn, Inject contents of epi pen at first sign of severe allergic reaction or anaphylaxis., Disp: 2 each, Rfl: 1    ergocalciferol (ERGOCALCIFEROL) 50,000 unit Cap, Take 50,000 Units by mouth every 7 days., Disp: , Rfl:     ezetimibe (ZETIA) 10 mg tablet, Take 10 mg by mouth once daily., Disp: , Rfl: 5    famotidine (PEPCID) 20 MG tablet, Take 20 mg by mouth 2 (two) times daily., Disp: , Rfl:     hydrOXYchloroQUINE (PLAQUENIL) 200 mg tablet, Take 1 tablet (200 mg total) by mouth once daily., Disp: 180 tablet, Rfl: 1    irbesartan (AVAPRO) 75 MG tablet, Take 75 mg by mouth once daily., Disp: , Rfl:     levothyroxine (SYNTHROID) 75 MCG tablet, Take 75 mcg by mouth once daily., Disp: , Rfl:     mupirocin (BACTROBAN) 2 % ointment, SMARTSI Application Topical 2-3 Times Daily, Disp: , Rfl:     olopatadine (PATADAY) 0.2 % Drop, INSTILL 1 DROP INTO EACH EYE ONCE DAILY, Disp: 2.5 mL, Rfl: 5    oxyCODONE-acetaminophen (PERCOCET) 7.5-325 mg per tablet, Take 1 tablet by mouth 4 (four) times daily as needed., Disp: , Rfl:     pantoprazole (PROTONIX) 40 MG tablet, Take 1 tablet by mouth once daily, Disp: 90 tablet, Rfl: 0    potassium chloride SA (K-DUR,KLOR-CON) 20 MEQ tablet, Take 20 mEq by mouth as needed. , Disp: , Rfl:     pravastatin (PRAVACHOL) 20 MG tablet, Take 20 mg by mouth once daily., Disp: , Rfl:     testosterone cypionate (DEPOTESTOTERONE CYPIONATE) 200 mg/mL injection, Inject 200 mg into the muscle every 14 (fourteen) days. 05mL every 2 weeks, Disp: , Rfl:     traMADoL (ULTRAM) 50 mg tablet, TAKE 1 TO 2 TABLETS BY MOUTH TWICE DAILY AS DIRECTED, Disp: , Rfl:     azelastine-fluticasone (DYMISTA) 137-50 mcg/spray Spry nassal spray, 1 spray by Each Nostril route 2 (two) times daily., Disp: 23 g, Rfl: 5    VYVANSE 50 mg capsule, TAKE 1 CAPSULE BY MOUTH ONCE DAILY, Disp: 30 capsule, Rfl:  "0    Review of Systems  As in HPI           Objective:      Vitals:    11/11/22 0820   BP: 122/80   Pulse: 74   Resp: 18   SpO2: 98%   Weight: 111.1 kg (245 lb)   Height: 5' 10" (1.778 m)     Physical Exam  Vitals reviewed.   Constitutional:       General: He is not in acute distress.  Cardiovascular:      Rate and Rhythm: Normal rate and regular rhythm.      Pulses: Normal pulses.      Heart sounds: Normal heart sounds.   Pulmonary:      Effort: Pulmonary effort is normal.      Breath sounds: Normal breath sounds.   Musculoskeletal:      Right lower leg: No edema.      Left lower leg: No edema.   Skin:     General: Skin is warm and dry.   Neurological:      General: No focal deficit present.      Mental Status: He is alert and oriented to person, place, and time.   Psychiatric:         Mood and Affect: Mood normal.         Behavior: Behavior normal.            Assessment:       1. Attention deficit hyperactivity disorder (ADHD), unspecified ADHD type    2. Seasonal allergies    3. Dysthymia    4. Flu vaccine need             Plan:       Attention deficit hyperactivity disorder (ADHD), unspecified ADHD type  -     VYVANSE 50 mg capsule; TAKE 1 CAPSULE BY MOUTH ONCE DAILY  Dispense: 30 capsule; Refill: 0    Seasonal allergies  -     azelastine-fluticasone (DYMISTA) 137-50 mcg/spray Spry nassal spray; 1 spray by Each Nostril route 2 (two) times daily.  Dispense: 23 g; Refill: 5    Dysthymia    Flu vaccine need  -     Influenza - Quadrivalent *Preferred* (6 months+) (PF)    Dong well with current medications re: ADHD and Dysthymia. Will continue. Follow up in 3 months.    Follow up for adhd.        11/11/2022 Josefina Strong M.D.      "

## 2022-11-13 ENCOUNTER — PATIENT MESSAGE (OUTPATIENT)
Dept: FAMILY MEDICINE | Facility: CLINIC | Age: 52
End: 2022-11-13

## 2022-11-13 ENCOUNTER — NURSE TRIAGE (OUTPATIENT)
Dept: ADMINISTRATIVE | Facility: CLINIC | Age: 52
End: 2022-11-13
Payer: MEDICARE

## 2022-11-13 NOTE — TELEPHONE ENCOUNTER
Cough started Friday. Took 2 covid test and both came back positive. Mild headache. Mild difficulty breathing only when coughing. Pt stated his mom just got home from the hospital. Instructed pt on quarantine and staying away from mom. Pt instructed on care advice. Care advice recommend pt see MD within 4 hours. Pt offered ans accepted [facility].   Reason for Disposition   MILD difficulty breathing (e.g., minimal/no SOB at rest, SOB with walking, pulse <100)    Additional Information   Negative: SEVERE difficulty breathing (e.g., struggling for each breath, speaks in single words)   Negative: Difficult to awaken or acting confused (e.g., disoriented, slurred speech)   Negative: Bluish (or gray) lips or face now   Negative: Shock suspected (e.g., cold/pale/clammy skin, too weak to stand, low BP, rapid pulse)   Negative: Sounds like a life-threatening emergency to the triager   Negative: SEVERE or constant chest pain or pressure  (Exception: Mild central chest pain, present only when coughing.)   Negative: MODERATE difficulty breathing (e.g., speaks in phrases, SOB even at rest, pulse 100-120)   Negative: [1] Headache AND [2] stiff neck (can't touch chin to chest)   Negative: Oxygen level (e.g., pulse oximetry) 90 percent or lower   Negative: Chest pain or pressure  (Exception: MILD central chest pain, present only when coughing)   Negative: Patient sounds very sick or weak to the triager    Protocols used: Coronavirus (COVID-19) Diagnosed or [id number]

## 2022-12-05 ENCOUNTER — PATIENT MESSAGE (OUTPATIENT)
Dept: FAMILY MEDICINE | Facility: CLINIC | Age: 52
End: 2022-12-05

## 2022-12-16 ENCOUNTER — PATIENT MESSAGE (OUTPATIENT)
Dept: FAMILY MEDICINE | Facility: CLINIC | Age: 52
End: 2022-12-16

## 2022-12-16 RX ORDER — METHYLPREDNISOLONE 4 MG/1
TABLET ORAL
Qty: 21 EACH | Refills: 0 | Status: SHIPPED | OUTPATIENT
Start: 2022-12-16 | End: 2023-01-06

## 2023-01-27 NOTE — PROGRESS NOTES
"Subjective:       Patient ID: Bharath Parr is a 52 y.o. male.    Chief Complaint: Disease Management    HPI    This is a 52-year-old man with history of ADHD, HTN, HLD, idiopathic dilated cardiomyopathy, hypothyroidism, vitamin-D deficiency, GERD, SONYA, tendon tears in both knees after a fall into a fire pit s/p knee surgery on chronic Tramadol per pain management, fibromyalgia, and seropositive (+RF, -CCP) nonerosive early rheumatoid arthritis diagnosed in 4/2022 and on Plaquenil who was last seen by Dr. Almendarez on 11/01/2022 and was doing well at that time. Currently, he reports doing well over all with "flares" every couple of months described as aching in his hips, shoulders, knees, and ankles.  He was taking Medrol Dosepaks as needed for flares.    Objective:   /86   Pulse 98   Wt 109.1 kg (240 lb 8.4 oz)   BMI 34.51 kg/m²      Physical Exam   Constitutional: normal appearance.   HENT:   Head: Normocephalic and atraumatic.   Cardiovascular: Normal rate, regular rhythm and normal heart sounds.   Pulmonary/Chest: Effort normal and breath sounds normal.   Musculoskeletal:      Comments: No synovitis, dactylitis, enthesitis, effusions     Neurological: He is alert.   Skin: Skin is warm and dry.   No skin thickening, telangiectasias, calcinosis, psoriasiform lesions, lupoid lesions  Surgical scars on both knees      No data to display    Labs reviewed by me   Positive RF 45.1  Negative ANCA, MPO, JANET, SSA, CCP, HLA B27  CBC (09/26/2022) WBC 14.04, HGB and platelets WNL   CMP (11/01/2022) liver and kidney function WNL   CRP (11/01/2022) 0.33  ESR (3/21/2022) 13  Pre DMARD labs:  Hepatitis-B and QuantiFERON completed 04/2022; hepatitis-C completed 11/2021  X-ray bilateral hands (04/12/2022) no obvious erosions    Assessment:       1. Rheumatoid arthritis, involving unspecified site, unspecified whether rheumatoid factor present    2. Osteoarthritis, generalized    3. Fibromyalgia    4. Medication " monitoring encounter    5. Long-term use of high-risk medication         This is a 52-year-old man with history of ADHD, HTN, HLD, idiopathic dilated cardiomyopathy, hypothyroidism, vitamin-D deficiency, GERD, SONYA, tendon tears in both knees after a fall into a fire pit s/p knee surgery on chronic Tramadol per pain management, fibromyalgia, and seropositive (+RF, -CCP) nonerosive early rheumatoid arthritis diagnosed in 4/2022 and on Plaquenil.  He was taking Medrol Dosepaks as needed for flares.  I do not find evidence of inflammatory arthritis on exam, however he does describe flares every couple of months.  Unclear if his flares are more due to fibromyalgia or osteoarthritis, though he does report overall improvement on Plaquenil.  I have advised him to keep a diary of his flares and to bring it to his follow-up visit.    Plan:       Problem List Items Addressed This Visit    None  Visit Diagnoses       Rheumatoid arthritis, involving unspecified site, unspecified whether rheumatoid factor present    -  Primary    Relevant Orders    CBC Auto Differential    Comprehensive Metabolic Panel    Sedimentation rate    C-Reactive Protein    Osteoarthritis, generalized        Relevant Medications    diflunisaL (DOLOBID) 500 mg Tab    Other Relevant Orders    CBC Auto Differential    Comprehensive Metabolic Panel    Sedimentation rate    C-Reactive Protein    Fibromyalgia        Relevant Medications    diflunisaL (DOLOBID) 500 mg Tab    Other Relevant Orders    CBC Auto Differential    Comprehensive Metabolic Panel    Sedimentation rate    C-Reactive Protein    Medication monitoring encounter        Relevant Orders    CBC Auto Differential    Comprehensive Metabolic Panel    Sedimentation rate    C-Reactive Protein    Long-term use of high-risk medication               - continue Plaquenil 200 mg daily  - Plaquenil eye exam completed 12/2022 per patient  - Pre DMARD labs:  Hepatitis-B and QuantiFERON completed 04/2022;  hepatitis-C completed 11/2021  - X-ray bilateral hands (04/12/2022) no obvious erosions    Follow up in 4 months    45 minutes of total time spent on the encounter, which includes face to face time and non-face to face time preparing to see the patient (eg, review of tests), Obtaining and/or reviewing separately obtained history, Documenting clinical information in the electronic or other health record, Independently interpreting results (not separately reported) and communicating results to the patient/family/caregiver, or Care coordination (not separately reported).       Hollie Feliciano M.D.  Rheumatology Dept  [place], [place]

## 2023-01-30 ENCOUNTER — OFFICE VISIT (OUTPATIENT)
Dept: RHEUMATOLOGY | Facility: CLINIC | Age: 53
End: 2023-01-30
Payer: MEDICARE

## 2023-01-30 VITALS
DIASTOLIC BLOOD PRESSURE: 86 MMHG | HEART RATE: 98 BPM | WEIGHT: 240.5 LBS | BODY MASS INDEX: 34.51 KG/M2 | SYSTOLIC BLOOD PRESSURE: 132 MMHG

## 2023-01-30 DIAGNOSIS — M06.9 RHEUMATOID ARTHRITIS, INVOLVING UNSPECIFIED SITE, UNSPECIFIED WHETHER RHEUMATOID FACTOR PRESENT: Primary | ICD-10-CM

## 2023-01-30 DIAGNOSIS — Z51.81 MEDICATION MONITORING ENCOUNTER: ICD-10-CM

## 2023-01-30 DIAGNOSIS — Z79.899 LONG-TERM USE OF HIGH-RISK MEDICATION: ICD-10-CM

## 2023-01-30 DIAGNOSIS — M79.7 FIBROMYALGIA: ICD-10-CM

## 2023-01-30 DIAGNOSIS — M15.9 OSTEOARTHRITIS, GENERALIZED: ICD-10-CM

## 2023-01-30 PROCEDURE — 99204 PR OFFICE/OUTPT VISIT, NEW, LEVL IV, 45-59 MIN: ICD-10-PCS | Mod: S$PBB,,, | Performed by: STUDENT IN AN ORGANIZED HEALTH CARE EDUCATION/TRAINING PROGRAM

## 2023-01-30 PROCEDURE — 99999 PR PBB SHADOW E&M-EST. PATIENT-LVL IV: ICD-10-PCS | Mod: PBBFAC,,, | Performed by: STUDENT IN AN ORGANIZED HEALTH CARE EDUCATION/TRAINING PROGRAM

## 2023-01-30 PROCEDURE — 99999 PR PBB SHADOW E&M-EST. PATIENT-LVL IV: CPT | Mod: PBBFAC,,, | Performed by: STUDENT IN AN ORGANIZED HEALTH CARE EDUCATION/TRAINING PROGRAM

## 2023-01-30 PROCEDURE — 99204 OFFICE O/P NEW MOD 45 MIN: CPT | Mod: S$PBB,,, | Performed by: STUDENT IN AN ORGANIZED HEALTH CARE EDUCATION/TRAINING PROGRAM

## 2023-01-30 PROCEDURE — 99214 OFFICE O/P EST MOD 30 MIN: CPT | Mod: PBBFAC,PN | Performed by: STUDENT IN AN ORGANIZED HEALTH CARE EDUCATION/TRAINING PROGRAM

## 2023-01-30 RX ORDER — DIFLUNISAL 500 MG/1
500 TABLET, FILM COATED ORAL 2 TIMES DAILY PRN
Qty: 180 TABLET | Refills: 1 | Status: SHIPPED | OUTPATIENT
Start: 2023-01-30 | End: 2023-04-30

## 2023-01-30 RX ORDER — HYDROXYCHLOROQUINE SULFATE 200 MG/1
200 TABLET, FILM COATED ORAL DAILY
Qty: 180 TABLET | Refills: 1 | Status: SHIPPED | OUTPATIENT
Start: 2023-01-30

## 2023-01-30 ASSESSMENT — ROUTINE ASSESSMENT OF PATIENT INDEX DATA (RAPID3)
TOTAL RAPID3 SCORE: 4.72
FATIGUE SCORE: 1.1
PATIENT GLOBAL ASSESSMENT SCORE: 7
PSYCHOLOGICAL DISTRESS SCORE: 0
MDHAQ FUNCTION SCORE: 0.8
PAIN SCORE: 4.5

## 2023-02-10 ENCOUNTER — OFFICE VISIT (OUTPATIENT)
Dept: FAMILY MEDICINE | Facility: CLINIC | Age: 53
End: 2023-02-10
Payer: MEDICARE

## 2023-02-10 VITALS
SYSTOLIC BLOOD PRESSURE: 132 MMHG | RESPIRATION RATE: 18 BRPM | WEIGHT: 238 LBS | OXYGEN SATURATION: 99 % | DIASTOLIC BLOOD PRESSURE: 82 MMHG | HEART RATE: 87 BPM | HEIGHT: 70 IN | BODY MASS INDEX: 34.07 KG/M2

## 2023-02-10 DIAGNOSIS — I10 PRIMARY HYPERTENSION: ICD-10-CM

## 2023-02-10 DIAGNOSIS — F90.9 ATTENTION DEFICIT HYPERACTIVITY DISORDER (ADHD), UNSPECIFIED ADHD TYPE: Primary | ICD-10-CM

## 2023-02-10 DIAGNOSIS — F34.1 DYSTHYMIA: ICD-10-CM

## 2023-02-10 PROCEDURE — 99214 PR OFFICE/OUTPT VISIT, EST, LEVL IV, 30-39 MIN: ICD-10-PCS | Mod: S$PBB,AQ,, | Performed by: FAMILY MEDICINE

## 2023-02-10 PROCEDURE — 99215 OFFICE O/P EST HI 40 MIN: CPT | Performed by: FAMILY MEDICINE

## 2023-02-10 PROCEDURE — 99214 OFFICE O/P EST MOD 30 MIN: CPT | Mod: S$PBB,AQ,, | Performed by: FAMILY MEDICINE

## 2023-02-10 RX ORDER — LISDEXAMFETAMINE DIMESYLATE 50 MG/1
CAPSULE ORAL
Qty: 30 CAPSULE | Refills: 0 | Status: SHIPPED | OUTPATIENT
Start: 2023-02-10 | End: 2023-03-12 | Stop reason: SDUPTHER

## 2023-02-10 RX ORDER — LISDEXAMFETAMINE DIMESYLATE 50 MG/1
CAPSULE ORAL
Qty: 30 CAPSULE | Refills: 0 | Status: SHIPPED | OUTPATIENT
Start: 2023-02-10 | End: 2023-02-10

## 2023-02-10 NOTE — PROGRESS NOTES
SUBJECTIVE:    Patient ID: Bharath Parr is a 52 y.o. male.    Chief Complaint: Follow-up and ADHD      HPI  Bharath Parr is a 52 y.o. male with a hx of Dilated Cardiomyopathy (EF 45%, followed by Dr. Taylor), HTN, HLD, Hypothyroid, GERD, Obesity, ADHD, SONYA, Fibromyalgia w ?RA (previously w Dr Almendarez), and Chronic pain (managed by Dr Frazier). He is being seen for scheduled follow up on ADHD, Dysthymia, and HTN.    Since last OV, has established care with Dr Diana Bran in Rheumatology. Note reviewed.    *HTN - on Carvedilol 12.5mg BID, Ibersartan 75mg. BP today mildly elevated on triage at 146/88, normalized to 132/82 on recheck after rest. No CP, SOB, or ROSAMARIA.    *ADHD - Vyvanse continues to work well at current dose. Takes daily. Denies palpitations, chest pain or pressure, appetite or sleep changes.    *Dysthymia - on Bupropion - feels he's doing well despite recent increased stress due to escalating care for his ill mom.        Lab Visit on 11/01/2022   Component Date Value Ref Range Status    Sodium 11/01/2022 134 (L)  136 - 145 mmol/L Final    Potassium 11/01/2022 3.5  3.5 - 5.1 mmol/L Final    Chloride 11/01/2022 99  95 - 110 mmol/L Final    CO2 11/01/2022 26  23 - 29 mmol/L Final    Glucose 11/01/2022 80  70 - 110 mg/dL Final    BUN 11/01/2022 10  6 - 20 mg/dL Final    Creatinine 11/01/2022 1.4  0.5 - 1.4 mg/dL Final    Calcium 11/01/2022 8.9  8.7 - 10.5 mg/dL Final    Total Protein 11/01/2022 7.6  6.0 - 8.4 g/dL Final    Albumin 11/01/2022 3.6  3.5 - 5.2 g/dL Final    Total Bilirubin 11/01/2022 0.5  0.1 - 1.0 mg/dL Final    Alkaline Phosphatase 11/01/2022 96  55 - 135 U/L Final    AST 11/01/2022 18  10 - 40 U/L Final    ALT 11/01/2022 13  10 - 44 U/L Final    Anion Gap 11/01/2022 9  8 - 16 mmol/L Final    eGFR 11/01/2022 >60.0  >60 mL/min/1.73 m^2 Final    CRP 11/01/2022 0.33  <0.76 mg/dL Final   Clinical Support on 10/03/2022   Component Date Value Ref Range Status    RESPIRATORY  CULTURE - THROAT 10/03/2022 Normal respiratory hellen   Final   Lab Visit on 09/26/2022   Component Date Value Ref Range Status    WBC 09/26/2022 14.04 (H)  3.90 - 12.70 K/uL Final    RBC 09/26/2022 5.52  4.60 - 6.20 M/uL Final    Hemoglobin 09/26/2022 16.1  14.0 - 18.0 g/dL Final    Hematocrit 09/26/2022 52.1  40.0 - 54.0 % Final    MCV 09/26/2022 94  82 - 98 fL Final    MCH 09/26/2022 29.2  27.0 - 31.0 pg Final    MCHC 09/26/2022 30.9 (L)  32.0 - 36.0 g/dL Final    RDW 09/26/2022 13.6  11.5 - 14.5 % Final    Platelets 09/26/2022 272  150 - 450 K/uL Final    MPV 09/26/2022 9.9  9.2 - 12.9 fL Final    Immature Granulocytes 09/26/2022 0.4  0.0 - 0.5 % Final    Gran # (ANC) 09/26/2022 11.5 (H)  1.8 - 7.7 K/uL Final    Immature Grans (Abs) 09/26/2022 0.06 (H)  0.00 - 0.04 K/uL Final    Lymph # 09/26/2022 1.2  1.0 - 4.8 K/uL Final    Mono # 09/26/2022 1.1 (H)  0.3 - 1.0 K/uL Final    Eos # 09/26/2022 0.1  0.0 - 0.5 K/uL Final    Baso # 09/26/2022 0.05  0.00 - 0.20 K/uL Final    nRBC 09/26/2022 0  0 /100 WBC Final    Gran % 09/26/2022 82.2 (H)  38.0 - 73.0 % Final    Lymph % 09/26/2022 8.6 (L)  18.0 - 48.0 % Final    Mono % 09/26/2022 7.7  4.0 - 15.0 % Final    Eosinophil % 09/26/2022 0.7  0.0 - 8.0 % Final    Basophil % 09/26/2022 0.4  0.0 - 1.9 % Final    Differential Method 09/26/2022 Automated   Final   Office Visit on 09/13/2022   Component Date Value Ref Range Status    Rapid Influenza A Ag 09/13/2022 Negative  Negative Final    Rapid Influenza B Ag 09/13/2022 Negative  Negative Final     Acceptable 09/13/2022 Yes   Final    POC Rapid COVID 09/13/2022 Negative  Negative Final     Acceptable 09/13/2022 Yes   Final       Past Medical History:   Diagnosis Date    ADD (attention deficit disorder)     Anxiety     Asthma     intermittent    Cardiomyopathy     Idiopathic    CHF (congestive heart failure)     Hyperlipidemia     Hypertension     Low testosterone     Rheumatoid arthritis,  "unspecified     Sleep apnea      Past Surgical History:   Procedure Laterality Date    CARDIAC CATHETERIZATION      left arm surgery      FB removal     Family History   Problem Relation Age of Onset    Cancer Mother         cervical    Heart disease Mother     Hypertension Mother     Stroke Father     Cancer Maternal Grandmother         bladder    Macular degeneration Paternal Grandmother     Cancer Paternal Grandfather         skin    Alzheimer's disease Paternal Grandfather        Tobacco History:  reports that he has never smoked. He has never used smokeless tobacco.  Alcohol History:  reports current alcohol use.  Drug History:  reports no history of drug use.    Review of patient's allergies indicates:   Allergen Reactions    Doxycycline      Makes my throat swell    Lyrica [pregabalin]      Memory loss    Micardis [telmisartan] Other (See Comments)     cough    Promethazine Other (See Comments)     Makes my skin crawl      Tetracyclines Other (See Comments) and Swelling       Current Outpatient Medications:     acetaminophen (TYLENOL) 500 MG tablet, Take 500 mg by mouth every 6 (six) hours as needed for Pain., Disp: , Rfl:     acetaminophen-codeine 300-30mg (TYLENOL #3) 300-30 mg Tab, Take 1 tablet by mouth 3 (three) times daily as needed., Disp: , Rfl:     azelastine-fluticasone (DYMISTA) 137-50 mcg/spray Spry nassal spray, 1 spray by Each Nostril route 2 (two) times daily., Disp: 23 g, Rfl: 5    BD LUER-KRISTIE SYRINGE 3 mL 21 gauge x 1 1/2" Syrg, SMARTSIG:Injection Every 2 Weeks, Disp: , Rfl:     BD REGULAR BEVEL NEEDLES 18 gauge x 1 1/2" Ndle, USE NEEDLES TO DRAW UP TESTOSTERONE EVERY 2 WEEKS, Disp: , Rfl:     buPROPion (WELLBUTRIN SR) 150 MG TBSR 12 hr tablet, Take 1 tablet (150 mg total) by mouth 2 (two) times daily., Disp: 180 tablet, Rfl: 3    carvediloL (COREG) 25 MG tablet, Take 25 mg by mouth 2 (two) times daily., Disp: , Rfl:     cetirizine (ZYRTEC) 10 MG tablet, Take 10 mg by mouth 2 (two) times " daily., Disp: , Rfl:     diflunisaL (DOLOBID) 500 mg Tab, Take 1 tablet (500 mg total) by mouth 2 (two) times daily as needed., Disp: 180 tablet, Rfl: 1    EPINEPHrine (EPIPEN) 0.3 mg/0.3 mL AtIn, Inject contents of epi pen at first sign of severe allergic reaction or anaphylaxis., Disp: 2 each, Rfl: 1    ergocalciferol (ERGOCALCIFEROL) 50,000 unit Cap, Take 50,000 Units by mouth every 7 days., Disp: , Rfl:     ezetimibe (ZETIA) 10 mg tablet, Take 10 mg by mouth once daily., Disp: , Rfl: 5    famotidine (PEPCID) 20 MG tablet, Take 20 mg by mouth 2 (two) times daily., Disp: , Rfl:     hydrOXYchloroQUINE (PLAQUENIL) 200 mg tablet, Take 1 tablet (200 mg total) by mouth once daily., Disp: 180 tablet, Rfl: 1    irbesartan (AVAPRO) 75 MG tablet, Take 75 mg by mouth once daily., Disp: , Rfl:     levothyroxine (SYNTHROID) 75 MCG tablet, Take 75 mcg by mouth once daily., Disp: , Rfl:     mupirocin (BACTROBAN) 2 % ointment, SMARTSI Application Topical 2-3 Times Daily, Disp: , Rfl:     olopatadine (PATADAY) 0.2 % Drop, INSTILL 1 DROP INTO EACH EYE ONCE DAILY, Disp: 2.5 mL, Rfl: 5    oxyCODONE-acetaminophen (PERCOCET) 7.5-325 mg per tablet, Take 1 tablet by mouth 4 (four) times daily as needed., Disp: , Rfl:     pantoprazole (PROTONIX) 40 MG tablet, Take 1 tablet by mouth once daily, Disp: 90 tablet, Rfl: 0    potassium chloride SA (K-DUR,KLOR-CON) 20 MEQ tablet, Take 20 mEq by mouth as needed. , Disp: , Rfl:     pravastatin (PRAVACHOL) 20 MG tablet, Take 20 mg by mouth once daily., Disp: , Rfl:     testosterone cypionate (DEPOTESTOTERONE CYPIONATE) 200 mg/mL injection, Inject 200 mg into the muscle every 14 (fourteen) days. 05mL every 2 weeks, Disp: , Rfl:     traMADoL (ULTRAM) 50 mg tablet, TAKE 1 TO 2 TABLETS BY MOUTH TWICE DAILY AS DIRECTED, Disp: , Rfl:     VYVANSE 50 mg capsule, TAKE 1 CAPSULE BY MOUTH ONCE DAILY, Disp: 30 capsule, Rfl: 0    Review of Systems  As in HPI           Objective:      Vitals:    02/10/23  "0815 02/10/23 0842   BP: (!) 146/88 132/82   Pulse: 87    Resp: 18    SpO2: 99%    Weight: 108 kg (238 lb)    Height: 5' 10" (1.778 m)        Physical Exam  Vitals reviewed.   Constitutional:       General: He is not in acute distress.  Cardiovascular:      Rate and Rhythm: Normal rate and regular rhythm.      Pulses: Normal pulses.      Heart sounds: Normal heart sounds.   Pulmonary:      Effort: Pulmonary effort is normal.      Breath sounds: Normal breath sounds.   Musculoskeletal:      Right lower leg: No edema.      Left lower leg: No edema.   Skin:     General: Skin is warm and dry.   Neurological:      General: No focal deficit present.      Mental Status: He is alert and oriented to person, place, and time.   Psychiatric:         Mood and Affect: Mood normal.         Behavior: Behavior normal.            Assessment:       1. Attention deficit hyperactivity disorder (ADHD), unspecified ADHD type    2. Dysthymia    3. Primary hypertension               Plan:       Attention deficit hyperactivity disorder (ADHD), unspecified ADHD type  -     Discontinue: VYVANSE 50 mg capsule; TAKE 1 CAPSULE BY MOUTH ONCE DAILY  Dispense: 30 capsule; Refill: 0  -     VYVANSE 50 mg capsule; TAKE 1 CAPSULE BY MOUTH ONCE DAILY  Dispense: 30 capsule; Refill: 0    Dysthymia    Primary hypertension    - Continues to do well on Vyvanse and Bupropion. Refill today.  - BP initially mildly elevated but normalized on my exam. Continue current regimen.  - Address cardiomyopathy next OV; last visit to cardiology?    Follow up in about 3 months (around 5/10/2023) for med mgmt.        2/11/2023 Josefina Strong M.D.        "

## 2023-05-08 ENCOUNTER — LAB VISIT (OUTPATIENT)
Dept: LAB | Facility: HOSPITAL | Age: 53
End: 2023-05-08
Attending: STUDENT IN AN ORGANIZED HEALTH CARE EDUCATION/TRAINING PROGRAM
Payer: MEDICARE

## 2023-05-08 DIAGNOSIS — M79.7 FIBROMYALGIA: ICD-10-CM

## 2023-05-08 DIAGNOSIS — M06.9 RHEUMATOID ARTHRITIS, INVOLVING UNSPECIFIED SITE, UNSPECIFIED WHETHER RHEUMATOID FACTOR PRESENT: ICD-10-CM

## 2023-05-08 DIAGNOSIS — M15.9 OSTEOARTHRITIS, GENERALIZED: ICD-10-CM

## 2023-05-08 DIAGNOSIS — Z51.81 MEDICATION MONITORING ENCOUNTER: ICD-10-CM

## 2023-05-08 LAB
ALBUMIN SERPL BCP-MCNC: 3.7 G/DL (ref 3.5–5.2)
ALP SERPL-CCNC: 84 U/L (ref 55–135)
ALT SERPL W/O P-5'-P-CCNC: 12 U/L (ref 10–44)
ANION GAP SERPL CALC-SCNC: 8 MMOL/L (ref 8–16)
AST SERPL-CCNC: 16 U/L (ref 10–40)
BASOPHILS # BLD AUTO: 0.05 K/UL (ref 0–0.2)
BASOPHILS NFR BLD: 0.7 % (ref 0–1.9)
BILIRUB SERPL-MCNC: 0.8 MG/DL (ref 0.1–1)
BUN SERPL-MCNC: 17 MG/DL (ref 6–20)
CALCIUM SERPL-MCNC: 9.1 MG/DL (ref 8.7–10.5)
CHLORIDE SERPL-SCNC: 102 MMOL/L (ref 95–110)
CO2 SERPL-SCNC: 29 MMOL/L (ref 23–29)
CREAT SERPL-MCNC: 1.8 MG/DL (ref 0.5–1.4)
CRP SERPL-MCNC: 0.21 MG/DL
DIFFERENTIAL METHOD: ABNORMAL
EOSINOPHIL # BLD AUTO: 0.2 K/UL (ref 0–0.5)
EOSINOPHIL NFR BLD: 3.1 % (ref 0–8)
ERYTHROCYTE [DISTWIDTH] IN BLOOD BY AUTOMATED COUNT: 13.8 % (ref 11.5–14.5)
ERYTHROCYTE [SEDIMENTATION RATE] IN BLOOD BY WESTERGREN METHOD: 2 MM/HR (ref 0–10)
EST. GFR  (NO RACE VARIABLE): 45.3 ML/MIN/1.73 M^2
GLUCOSE SERPL-MCNC: 82 MG/DL (ref 70–110)
HCT VFR BLD AUTO: 47.6 % (ref 40–54)
HGB BLD-MCNC: 15.2 G/DL (ref 14–18)
IMM GRANULOCYTES # BLD AUTO: 0.02 K/UL (ref 0–0.04)
IMM GRANULOCYTES NFR BLD AUTO: 0.3 % (ref 0–0.5)
LYMPHOCYTES # BLD AUTO: 1.2 K/UL (ref 1–4.8)
LYMPHOCYTES NFR BLD: 16.8 % (ref 18–48)
MCH RBC QN AUTO: 29.1 PG (ref 27–31)
MCHC RBC AUTO-ENTMCNC: 31.9 G/DL (ref 32–36)
MCV RBC AUTO: 91 FL (ref 82–98)
MONOCYTES # BLD AUTO: 0.8 K/UL (ref 0.3–1)
MONOCYTES NFR BLD: 11.5 % (ref 4–15)
NEUTROPHILS # BLD AUTO: 4.8 K/UL (ref 1.8–7.7)
NEUTROPHILS NFR BLD: 67.6 % (ref 38–73)
NRBC BLD-RTO: 0 /100 WBC
PLATELET # BLD AUTO: 242 K/UL (ref 150–450)
PMV BLD AUTO: 8.6 FL (ref 9.2–12.9)
POTASSIUM SERPL-SCNC: 4.4 MMOL/L (ref 3.5–5.1)
PROT SERPL-MCNC: 7.5 G/DL (ref 6–8.4)
RBC # BLD AUTO: 5.22 M/UL (ref 4.6–6.2)
SODIUM SERPL-SCNC: 139 MMOL/L (ref 136–145)
WBC # BLD AUTO: 7.14 K/UL (ref 3.9–12.7)

## 2023-05-08 PROCEDURE — 85651 RBC SED RATE NONAUTOMATED: CPT | Performed by: STUDENT IN AN ORGANIZED HEALTH CARE EDUCATION/TRAINING PROGRAM

## 2023-05-08 PROCEDURE — 86140 C-REACTIVE PROTEIN: CPT | Performed by: STUDENT IN AN ORGANIZED HEALTH CARE EDUCATION/TRAINING PROGRAM

## 2023-05-08 PROCEDURE — 80053 COMPREHEN METABOLIC PANEL: CPT | Performed by: STUDENT IN AN ORGANIZED HEALTH CARE EDUCATION/TRAINING PROGRAM

## 2023-05-08 PROCEDURE — 85025 COMPLETE CBC W/AUTO DIFF WBC: CPT | Performed by: STUDENT IN AN ORGANIZED HEALTH CARE EDUCATION/TRAINING PROGRAM

## 2023-05-08 PROCEDURE — 36415 COLL VENOUS BLD VENIPUNCTURE: CPT | Performed by: STUDENT IN AN ORGANIZED HEALTH CARE EDUCATION/TRAINING PROGRAM

## 2023-05-09 ENCOUNTER — PATIENT MESSAGE (OUTPATIENT)
Dept: RHEUMATOLOGY | Facility: CLINIC | Age: 53
End: 2023-05-09
Payer: MEDICARE

## 2023-05-11 ENCOUNTER — OFFICE VISIT (OUTPATIENT)
Dept: FAMILY MEDICINE | Facility: CLINIC | Age: 53
End: 2023-05-11
Payer: MEDICARE

## 2023-05-11 VITALS
DIASTOLIC BLOOD PRESSURE: 78 MMHG | BODY MASS INDEX: 34.36 KG/M2 | WEIGHT: 240 LBS | SYSTOLIC BLOOD PRESSURE: 126 MMHG | RESPIRATION RATE: 18 BRPM | HEART RATE: 84 BPM | HEIGHT: 70 IN | OXYGEN SATURATION: 99 %

## 2023-05-11 DIAGNOSIS — F90.9 ATTENTION DEFICIT HYPERACTIVITY DISORDER (ADHD), UNSPECIFIED ADHD TYPE: Primary | ICD-10-CM

## 2023-05-11 DIAGNOSIS — R94.4 DECREASED CALCULATED GFR: ICD-10-CM

## 2023-05-11 DIAGNOSIS — N17.9 AKI (ACUTE KIDNEY INJURY): Primary | ICD-10-CM

## 2023-05-11 DIAGNOSIS — F34.1 DYSTHYMIA: ICD-10-CM

## 2023-05-11 DIAGNOSIS — I42.0 DILATED CARDIOMYOPATHY: ICD-10-CM

## 2023-05-11 PROCEDURE — 99213 PR OFFICE/OUTPT VISIT, EST, LEVL III, 20-29 MIN: ICD-10-PCS | Mod: S$PBB,AQ,, | Performed by: FAMILY MEDICINE

## 2023-05-11 PROCEDURE — 99213 OFFICE O/P EST LOW 20 MIN: CPT | Mod: S$PBB,AQ,, | Performed by: FAMILY MEDICINE

## 2023-05-11 PROCEDURE — 99213 OFFICE O/P EST LOW 20 MIN: CPT | Performed by: FAMILY MEDICINE

## 2023-05-11 RX ORDER — LISDEXAMFETAMINE DIMESYLATE 50 MG/1
CAPSULE ORAL
Qty: 30 CAPSULE | Refills: 0 | Status: SHIPPED | OUTPATIENT
Start: 2023-05-11 | End: 2023-06-12 | Stop reason: SDUPTHER

## 2023-05-11 RX ORDER — BUPROPION HYDROCHLORIDE 150 MG/1
150 TABLET, EXTENDED RELEASE ORAL 2 TIMES DAILY
Qty: 180 TABLET | Refills: 3 | Status: SHIPPED | OUTPATIENT
Start: 2023-05-11

## 2023-05-11 NOTE — PROGRESS NOTES
SUBJECTIVE:    Patient ID: Bharath Parr is a 52 y.o. male.    Chief Complaint: Follow-up and ADHD    HPI  Bharath Parr is a 52 y.o. male with a hx of Dilated Cardiomyopathy (EF 45%, followed by Dr. Taylor), HTN, HLD, Hypothyroid, GERD, Obesity, ADHD, SONYA, Fibromyalgia w ?RA (previously w Dr Almendarez), and Chronic pain (managed by Dr Frazier). He is being seen for scheduled follow up on ADHD.    Has had labs through Rheumatology since last OV, notable for increased Cr 1.8 w eGFR 45.3.    Reports that Vyvanse continues to work well at current dose. Takes daily. Denies palpitations, chest pain or pressure, appetite or sleep changes.    Feels Wellbutrin continues to work well for him at current dose. Needs refill.    Regarding his Cardiac health, he will be seeing Dr Taylor in a week.     Lab Visit on 05/08/2023   Component Date Value Ref Range Status    WBC 05/08/2023 7.14  3.90 - 12.70 K/uL Final    RBC 05/08/2023 5.22  4.60 - 6.20 M/uL Final    Hemoglobin 05/08/2023 15.2  14.0 - 18.0 g/dL Final    Hematocrit 05/08/2023 47.6  40.0 - 54.0 % Final    MCV 05/08/2023 91  82 - 98 fL Final    MCH 05/08/2023 29.1  27.0 - 31.0 pg Final    MCHC 05/08/2023 31.9 (L)  32.0 - 36.0 g/dL Final    RDW 05/08/2023 13.8  11.5 - 14.5 % Final    Platelets 05/08/2023 242  150 - 450 K/uL Final    MPV 05/08/2023 8.6 (L)  9.2 - 12.9 fL Final    Immature Granulocytes 05/08/2023 0.3  0.0 - 0.5 % Final    Gran # (ANC) 05/08/2023 4.8  1.8 - 7.7 K/uL Final    Immature Grans (Abs) 05/08/2023 0.02  0.00 - 0.04 K/uL Final    Lymph # 05/08/2023 1.2  1.0 - 4.8 K/uL Final    Mono # 05/08/2023 0.8  0.3 - 1.0 K/uL Final    Eos # 05/08/2023 0.2  0.0 - 0.5 K/uL Final    Baso # 05/08/2023 0.05  0.00 - 0.20 K/uL Final    nRBC 05/08/2023 0  0 /100 WBC Final    Gran % 05/08/2023 67.6  38.0 - 73.0 % Final    Lymph % 05/08/2023 16.8 (L)  18.0 - 48.0 % Final    Mono % 05/08/2023 11.5  4.0 - 15.0 % Final    Eosinophil % 05/08/2023 3.1  0.0  - 8.0 % Final    Basophil % 05/08/2023 0.7  0.0 - 1.9 % Final    Differential Method 05/08/2023 Automated   Final    Sodium 05/08/2023 139  136 - 145 mmol/L Final    Potassium 05/08/2023 4.4  3.5 - 5.1 mmol/L Final    Chloride 05/08/2023 102  95 - 110 mmol/L Final    CO2 05/08/2023 29  23 - 29 mmol/L Final    Glucose 05/08/2023 82  70 - 110 mg/dL Final    BUN 05/08/2023 17  6 - 20 mg/dL Final    Creatinine 05/08/2023 1.8 (H)  0.5 - 1.4 mg/dL Final    Calcium 05/08/2023 9.1  8.7 - 10.5 mg/dL Final    Total Protein 05/08/2023 7.5  6.0 - 8.4 g/dL Final    Albumin 05/08/2023 3.7  3.5 - 5.2 g/dL Final    Total Bilirubin 05/08/2023 0.8  0.1 - 1.0 mg/dL Final    Alkaline Phosphatase 05/08/2023 84  55 - 135 U/L Final    AST 05/08/2023 16  10 - 40 U/L Final    ALT 05/08/2023 12  10 - 44 U/L Final    Anion Gap 05/08/2023 8  8 - 16 mmol/L Final    eGFR 05/08/2023 45.3 (A)  >60 mL/min/1.73 m^2 Final    Sed Rate 05/08/2023 2  0 - 10 mm/Hr Final    CRP 05/08/2023 0.21  <0.76 mg/dL Final       Past Medical History:   Diagnosis Date    ADD (attention deficit disorder)     Anxiety     Asthma     intermittent    Cardiomyopathy     Idiopathic    CHF (congestive heart failure)     Hyperlipidemia     Hypertension     Low testosterone     Rheumatoid arthritis, unspecified     Sleep apnea      Past Surgical History:   Procedure Laterality Date    CARDIAC CATHETERIZATION      left arm surgery      FB removal     Family History   Problem Relation Age of Onset    Cancer Mother         cervical    Heart disease Mother     Hypertension Mother     Stroke Father     Cancer Maternal Grandmother         bladder    Macular degeneration Paternal Grandmother     Cancer Paternal Grandfather         skin    Alzheimer's disease Paternal Grandfather        Tobacco History:  reports that he has never smoked. He has never been exposed to tobacco smoke. He has never used smokeless tobacco.  Alcohol History:  reports current alcohol use.  Drug History:   "reports no history of drug use.    Review of patient's allergies indicates:   Allergen Reactions    Doxycycline      Makes my throat swell    Lyrica [pregabalin]      Memory loss    Micardis [telmisartan] Other (See Comments)     cough    Promethazine Other (See Comments)     Makes my skin crawl      Tetracyclines Other (See Comments) and Swelling       Current Outpatient Medications:     acetaminophen (TYLENOL) 500 MG tablet, Take 500 mg by mouth every 6 (six) hours as needed for Pain., Disp: , Rfl:     acetaminophen-codeine 300-30mg (TYLENOL #3) 300-30 mg Tab, Take 1 tablet by mouth 3 (three) times daily as needed., Disp: , Rfl:     azelastine-fluticasone (DYMISTA) 137-50 mcg/spray Spry nassal spray, 1 spray by Each Nostril route 2 (two) times daily., Disp: 23 g, Rfl: 5    BD LUER-KRISTIE SYRINGE 3 mL 21 gauge x 1 1/2" Syrg, SMARTSIG:Injection Every 2 Weeks, Disp: , Rfl:     BD REGULAR BEVEL NEEDLES 18 gauge x 1 1/2" Ndle, USE NEEDLES TO DRAW UP TESTOSTERONE EVERY 2 WEEKS, Disp: , Rfl:     carvediloL (COREG) 25 MG tablet, Take 25 mg by mouth 2 (two) times daily., Disp: , Rfl:     cetirizine (ZYRTEC) 10 MG tablet, Take 10 mg by mouth 2 (two) times daily., Disp: , Rfl:     EPINEPHrine (EPIPEN) 0.3 mg/0.3 mL AtIn, Inject contents of epi pen at first sign of severe allergic reaction or anaphylaxis., Disp: 2 each, Rfl: 1    ergocalciferol (ERGOCALCIFEROL) 50,000 unit Cap, Take 50,000 Units by mouth every 7 days., Disp: , Rfl:     ezetimibe (ZETIA) 10 mg tablet, Take 10 mg by mouth once daily., Disp: , Rfl: 5    famotidine (PEPCID) 20 MG tablet, Take 20 mg by mouth 2 (two) times daily., Disp: , Rfl:     hydrOXYchloroQUINE (PLAQUENIL) 200 mg tablet, Take 1 tablet (200 mg total) by mouth once daily., Disp: 180 tablet, Rfl: 1    irbesartan (AVAPRO) 75 MG tablet, Take 75 mg by mouth once daily., Disp: , Rfl:     levothyroxine (SYNTHROID) 75 MCG tablet, Take 75 mcg by mouth once daily., Disp: , Rfl:     mupirocin (BACTROBAN) 2 % " "ointment, SMARTSI Application Topical 2-3 Times Daily, Disp: , Rfl:     olopatadine (PATADAY) 0.2 % Drop, INSTILL 1 DROP INTO EACH EYE ONCE DAILY, Disp: 2.5 mL, Rfl: 5    oxyCODONE-acetaminophen (PERCOCET) 7.5-325 mg per tablet, Take 1 tablet by mouth 4 (four) times daily as needed., Disp: , Rfl:     pantoprazole (PROTONIX) 40 MG tablet, Take 1 tablet by mouth once daily, Disp: 90 tablet, Rfl: 0    potassium chloride SA (K-DUR,KLOR-CON) 20 MEQ tablet, Take 20 mEq by mouth as needed. , Disp: , Rfl:     pravastatin (PRAVACHOL) 20 MG tablet, Take 20 mg by mouth once daily., Disp: , Rfl:     testosterone cypionate (DEPOTESTOTERONE CYPIONATE) 200 mg/mL injection, Inject 200 mg into the muscle every 14 (fourteen) days. 05mL every 2 weeks, Disp: , Rfl:     traMADoL (ULTRAM) 50 mg tablet, TAKE 1 TO 2 TABLETS BY MOUTH TWICE DAILY AS DIRECTED, Disp: , Rfl:     buPROPion (WELLBUTRIN SR) 150 MG TBSR 12 hr tablet, Take 1 tablet (150 mg total) by mouth 2 (two) times daily., Disp: 180 tablet, Rfl: 3    VYVANSE 50 mg capsule, TAKE 1 CAPSULE BY MOUTH ONCE DAILY, Disp: 30 capsule, Rfl: 0    Review of Systems  As in HPI           Objective:      Vitals:    23 0821   BP: 126/78   Pulse: 84   Resp: 18   SpO2: 99%   Weight: 108.9 kg (240 lb)   Height: 5' 10" (1.778 m)     Physical Exam  Vitals reviewed.   Constitutional:       General: He is not in acute distress.  Cardiovascular:      Rate and Rhythm: Normal rate and regular rhythm.      Pulses: Normal pulses.      Heart sounds: Normal heart sounds.   Pulmonary:      Effort: Pulmonary effort is normal.      Breath sounds: Normal breath sounds.   Musculoskeletal:      Right lower leg: No edema.      Left lower leg: No edema.   Skin:     General: Skin is warm and dry.   Neurological:      General: No focal deficit present.      Mental Status: He is alert and oriented to person, place, and time.   Psychiatric:         Mood and Affect: Mood normal.         Behavior: Behavior " normal.            Assessment:       1. Attention deficit hyperactivity disorder (ADHD), unspecified ADHD type    2. Dysthymia    3. Dilated cardiomyopathy    4. Decreased calculated GFR             Plan:       Attention deficit hyperactivity disorder (ADHD), unspecified ADHD type  -     VYVANSE 50 mg capsule; TAKE 1 CAPSULE BY MOUTH ONCE DAILY  Dispense: 30 capsule; Refill: 0    Dysthymia  -     buPROPion (WELLBUTRIN SR) 150 MG TBSR 12 hr tablet; Take 1 tablet (150 mg total) by mouth 2 (two) times daily.  Dispense: 180 tablet; Refill: 3    Dilated cardiomyopathy    Decreased calculated GFR    - Doing well re: ADHD and Dysthymia. Will continue current regimen.  - Encouraged to keep appointment w Cardiology to follow up on dilated cardiomyopathy.  - Monitor re: decreased eGFR.    No follow-ups on file.        5/14/2023 Josefina Strong M.D.

## 2023-05-19 NOTE — PROGRESS NOTES
"Subjective:      Patient ID: Bharath Parr is a 52 y.o. male.    Chief Complaint: Disease Management    HPI    Rheumatologic History:   - Diagnosis/es:   - Fibromyalgia   - seropositive (+RF, -CCP) nonerosive early rheumatoid arthritis diagnosed in 4/2022   - Positive serologies: RF  - Negative serologies: CCP  - Infectious screening labs: Negative hepatitis-B and QuantiFERON 04/2022; negative hepatitis-C 11/2021  - Imaging:   - X-ray bilateral hands (04/12/2022) no obvious erosions  - Previous Treatments: -  - Current Treatments:    - HCQ 200mg daily  - Plaquenil eye exam: no plaquenil toxicity 12/2022     Interval History:   He was last seen on 01/30/2023, and reported flares every couple of months. I did not find inflammatory arthritis on exam at that time. Currently, he reports chronic pain in his hands and knees. He has been taking diflunisal twice a daily not realizing it is an NSAID.     Objective:   BP (!) 148/87   Pulse 92   Ht 5' 10" (1.778 m)   Wt 111.4 kg (245 lb 7.7 oz)   BMI 35.22 kg/m²   Physical Exam   Constitutional: normal appearance.   HENT:   Head: Normocephalic and atraumatic.   Musculoskeletal:      Comments: Chronic right knee swelling and tenderness s/p partial knee replacement  Otherwise, no synovitis, dactylitis, enthesitis, effusions   Neurological: He is alert.   Skin: Skin is warm and dry. No rash noted.     No data to display     Labs reviewed by me (05/08/2023)  CBC WNL   CMP Cr 1.6 <- 1.8 <- 1.4 GFR 45.3  ESR and CRP WNL    Assessment:     1. Rheumatoid arthritis, involving unspecified site, unspecified whether rheumatoid factor present    2. Osteoarthritis, generalized    3. Fibromyalgia    4. Medication monitoring encounter    5. Long-term use of Plaquenil    6. REZA (acute kidney injury)      This is a 52-year-old man with history of ADHD, HTN, HLD, idiopathic dilated cardiomyopathy, hypothyroidism, vitamin-D deficiency, GERD, SONYA, tendon tears in both knees after a fall " into a fire pit s/p knee surgery on chronic Tramadol per pain management, fibromyalgia, and seropositive (+RF, -CCP) nonerosive early rheumatoid arthritis diagnosed in 4/2022 and on Plaquenil.  Patient to discontinue all NSAIDs including diflunisal.  I advised him to reach out to his pain management physician to adjust his tramadol dose.  Will obtain an MRI of the right hand as I do not find active synovitis on exam but he may have subclinical synovitis.     Plan:     Problem List Items Addressed This Visit    None  Visit Diagnoses       Rheumatoid arthritis, involving unspecified site, unspecified whether rheumatoid factor present    -  Primary    Relevant Orders    MRI Hand Wrist W WO Right_Rheumatoid    Osteoarthritis, generalized        Fibromyalgia        Medication monitoring encounter        Long-term use of Plaquenil        REZA (acute kidney injury)              - MRI right hand  - continue Plaquenil 200 mg daily  - DC diflunisal  - Plaquenil eye exam due 12/2023  - Pre DMARD labs due 4/2023    Follow up in 6-8 weeks    30 minutes of total time spent on the encounter, which includes face to face time and non-face to face time preparing to see the patient (eg, review of tests), Obtaining and/or reviewing separately obtained history, Documenting clinical information in the electronic or other health record, Independently interpreting results (not separately reported) and communicating results to the patient/family/caregiver, or Care coordination (not separately reported).       Hollie Feliciano M.D.  Rheumatology Dept  [place], [place]

## 2023-05-22 ENCOUNTER — LAB VISIT (OUTPATIENT)
Dept: LAB | Facility: HOSPITAL | Age: 53
End: 2023-05-22
Attending: INTERNAL MEDICINE
Payer: MEDICARE

## 2023-05-22 DIAGNOSIS — E78.5 HYPERLIPEMIA: Primary | ICD-10-CM

## 2023-05-22 LAB
ALBUMIN SERPL BCP-MCNC: 3.4 G/DL (ref 3.5–5.2)
ALP SERPL-CCNC: 77 U/L (ref 55–135)
ALT SERPL W/O P-5'-P-CCNC: 16 U/L (ref 10–44)
ANION GAP SERPL CALC-SCNC: 7 MMOL/L (ref 8–16)
AST SERPL-CCNC: 17 U/L (ref 10–40)
BILIRUB SERPL-MCNC: 0.7 MG/DL (ref 0.1–1)
BUN SERPL-MCNC: 14 MG/DL (ref 6–20)
CALCIUM SERPL-MCNC: 8.6 MG/DL (ref 8.7–10.5)
CHLORIDE SERPL-SCNC: 101 MMOL/L (ref 95–110)
CHOLEST SERPL-MCNC: 133 MG/DL (ref 120–199)
CHOLEST/HDLC SERPL: 3.8 {RATIO} (ref 2–5)
CK MB SERPL-MCNC: 0.7 NG/ML (ref 0.1–6.5)
CK SERPL-CCNC: 84 U/L (ref 20–200)
CO2 SERPL-SCNC: 26 MMOL/L (ref 23–29)
CREAT SERPL-MCNC: 1.6 MG/DL (ref 0.5–1.4)
EST. GFR  (NO RACE VARIABLE): 52.2 ML/MIN/1.73 M^2
GLUCOSE SERPL-MCNC: 103 MG/DL (ref 70–110)
HDLC SERPL-MCNC: 35 MG/DL (ref 40–75)
HDLC SERPL: 26.3 % (ref 20–50)
LDLC SERPL CALC-MCNC: 59 MG/DL (ref 63–159)
NONHDLC SERPL-MCNC: 98 MG/DL
POTASSIUM SERPL-SCNC: 4 MMOL/L (ref 3.5–5.1)
PROT SERPL-MCNC: 6.9 G/DL (ref 6–8.4)
SODIUM SERPL-SCNC: 134 MMOL/L (ref 136–145)
TRIGL SERPL-MCNC: 195 MG/DL (ref 30–150)

## 2023-05-22 PROCEDURE — 82550 ASSAY OF CK (CPK): CPT | Performed by: INTERNAL MEDICINE

## 2023-05-22 PROCEDURE — 80053 COMPREHEN METABOLIC PANEL: CPT | Performed by: INTERNAL MEDICINE

## 2023-05-22 PROCEDURE — 82553 CREATINE MB FRACTION: CPT | Performed by: INTERNAL MEDICINE

## 2023-05-22 PROCEDURE — 80061 LIPID PANEL: CPT | Performed by: INTERNAL MEDICINE

## 2023-05-22 PROCEDURE — 36415 COLL VENOUS BLD VENIPUNCTURE: CPT | Performed by: INTERNAL MEDICINE

## 2023-05-23 ENCOUNTER — OFFICE VISIT (OUTPATIENT)
Dept: RHEUMATOLOGY | Facility: CLINIC | Age: 53
End: 2023-05-23
Payer: MEDICARE

## 2023-05-23 VITALS
HEIGHT: 70 IN | WEIGHT: 245.5 LBS | SYSTOLIC BLOOD PRESSURE: 148 MMHG | HEART RATE: 92 BPM | BODY MASS INDEX: 35.15 KG/M2 | DIASTOLIC BLOOD PRESSURE: 87 MMHG

## 2023-05-23 DIAGNOSIS — Z79.899 LONG-TERM USE OF PLAQUENIL: ICD-10-CM

## 2023-05-23 DIAGNOSIS — Z51.81 MEDICATION MONITORING ENCOUNTER: ICD-10-CM

## 2023-05-23 DIAGNOSIS — M06.9 RHEUMATOID ARTHRITIS, INVOLVING UNSPECIFIED SITE, UNSPECIFIED WHETHER RHEUMATOID FACTOR PRESENT: Primary | ICD-10-CM

## 2023-05-23 DIAGNOSIS — M79.7 FIBROMYALGIA: ICD-10-CM

## 2023-05-23 DIAGNOSIS — M15.9 OSTEOARTHRITIS, GENERALIZED: ICD-10-CM

## 2023-05-23 DIAGNOSIS — N17.9 AKI (ACUTE KIDNEY INJURY): ICD-10-CM

## 2023-05-23 PROCEDURE — 99999 PR PBB SHADOW E&M-EST. PATIENT-LVL IV: ICD-10-PCS | Mod: PBBFAC,,, | Performed by: STUDENT IN AN ORGANIZED HEALTH CARE EDUCATION/TRAINING PROGRAM

## 2023-05-23 PROCEDURE — 99215 PR OFFICE/OUTPT VISIT, EST, LEVL V, 40-54 MIN: ICD-10-PCS | Mod: S$PBB,,, | Performed by: STUDENT IN AN ORGANIZED HEALTH CARE EDUCATION/TRAINING PROGRAM

## 2023-05-23 PROCEDURE — 99214 OFFICE O/P EST MOD 30 MIN: CPT | Mod: PBBFAC,PN | Performed by: STUDENT IN AN ORGANIZED HEALTH CARE EDUCATION/TRAINING PROGRAM

## 2023-05-23 PROCEDURE — 99215 OFFICE O/P EST HI 40 MIN: CPT | Mod: S$PBB,,, | Performed by: STUDENT IN AN ORGANIZED HEALTH CARE EDUCATION/TRAINING PROGRAM

## 2023-05-23 PROCEDURE — 99999 PR PBB SHADOW E&M-EST. PATIENT-LVL IV: CPT | Mod: PBBFAC,,, | Performed by: STUDENT IN AN ORGANIZED HEALTH CARE EDUCATION/TRAINING PROGRAM

## 2023-05-23 ASSESSMENT — ROUTINE ASSESSMENT OF PATIENT INDEX DATA (RAPID3)
PAIN SCORE: 5.5
FATIGUE SCORE: 1.1
MDHAQ FUNCTION SCORE: 0.5
PATIENT GLOBAL ASSESSMENT SCORE: 7
PSYCHOLOGICAL DISTRESS SCORE: 0
TOTAL RAPID3 SCORE: 4.72

## 2023-06-06 ENCOUNTER — HOSPITAL ENCOUNTER (OUTPATIENT)
Dept: RADIOLOGY | Facility: HOSPITAL | Age: 53
Discharge: HOME | End: 2023-06-06
Attending: STUDENT IN AN ORGANIZED HEALTH CARE EDUCATION/TRAINING PROGRAM
Payer: MEDICARE

## 2023-06-06 DIAGNOSIS — M06.9 RHEUMATOID ARTHRITIS, INVOLVING UNSPECIFIED SITE, UNSPECIFIED WHETHER RHEUMATOID FACTOR PRESENT: ICD-10-CM

## 2023-06-06 PROCEDURE — 73223 MRI JOINT UPR EXTR W/O&W/DYE: CPT | Mod: TC,PO,RT

## 2023-06-06 PROCEDURE — A9585 GADOBUTROL INJECTION: HCPCS | Mod: PO | Performed by: STUDENT IN AN ORGANIZED HEALTH CARE EDUCATION/TRAINING PROGRAM

## 2023-06-06 PROCEDURE — 25500020 PHARM REV CODE 255: Mod: PO | Performed by: STUDENT IN AN ORGANIZED HEALTH CARE EDUCATION/TRAINING PROGRAM

## 2023-06-06 PROCEDURE — 73223 MRI HAND_WRIST W WO RIGHT_RHEUMATOID ARTHRITIS: ICD-10-PCS | Mod: 26,RT,, | Performed by: RADIOLOGY

## 2023-06-06 PROCEDURE — 73223 MRI JOINT UPR EXTR W/O&W/DYE: CPT | Mod: 26,RT,, | Performed by: RADIOLOGY

## 2023-06-06 RX ORDER — GADOBUTROL 604.72 MG/ML
10 INJECTION INTRAVENOUS
Status: COMPLETED | OUTPATIENT
Start: 2023-06-06 | End: 2023-06-06

## 2023-06-06 RX ADMIN — GADOBUTROL 10 ML: 604.72 INJECTION INTRAVENOUS at 01:06

## 2023-06-12 DIAGNOSIS — F90.9 ATTENTION DEFICIT HYPERACTIVITY DISORDER (ADHD), UNSPECIFIED ADHD TYPE: ICD-10-CM

## 2023-06-12 RX ORDER — LISDEXAMFETAMINE DIMESYLATE 50 MG/1
CAPSULE ORAL
Qty: 30 CAPSULE | Refills: 0 | Status: SHIPPED | OUTPATIENT
Start: 2023-06-12 | End: 2023-07-14 | Stop reason: SDUPTHER

## 2023-06-16 ENCOUNTER — TELEPHONE (OUTPATIENT)
Dept: RHEUMATOLOGY | Facility: CLINIC | Age: 53
End: 2023-06-16
Payer: MEDICARE

## 2023-07-14 DIAGNOSIS — J30.2 SEASONAL ALLERGIES: ICD-10-CM

## 2023-07-14 DIAGNOSIS — F90.9 ATTENTION DEFICIT HYPERACTIVITY DISORDER (ADHD), UNSPECIFIED ADHD TYPE: ICD-10-CM

## 2023-07-14 RX ORDER — LISDEXAMFETAMINE DIMESYLATE 50 MG/1
CAPSULE ORAL
Qty: 30 CAPSULE | Refills: 0 | Status: SHIPPED | OUTPATIENT
Start: 2023-07-14 | End: 2023-08-11 | Stop reason: SDUPTHER

## 2023-07-14 RX ORDER — AZELASTINE HYDROCHLORIDE, FLUTICASONE PROPIONATE 137; 50 UG/1; UG/1
1 SPRAY, METERED NASAL 2 TIMES DAILY
Qty: 23 G | Refills: 5 | Status: SHIPPED | OUTPATIENT
Start: 2023-07-14 | End: 2023-09-21

## 2023-08-11 ENCOUNTER — OFFICE VISIT (OUTPATIENT)
Dept: FAMILY MEDICINE | Facility: CLINIC | Age: 53
End: 2023-08-11
Payer: MEDICARE

## 2023-08-11 VITALS
DIASTOLIC BLOOD PRESSURE: 87 MMHG | HEART RATE: 96 BPM | HEIGHT: 70 IN | OXYGEN SATURATION: 99 % | WEIGHT: 242.63 LBS | SYSTOLIC BLOOD PRESSURE: 131 MMHG | BODY MASS INDEX: 34.74 KG/M2

## 2023-08-11 DIAGNOSIS — I42.0 DILATED CARDIOMYOPATHY: ICD-10-CM

## 2023-08-11 DIAGNOSIS — F34.1 DYSTHYMIA: ICD-10-CM

## 2023-08-11 DIAGNOSIS — F90.9 ATTENTION DEFICIT HYPERACTIVITY DISORDER (ADHD), UNSPECIFIED ADHD TYPE: Primary | ICD-10-CM

## 2023-08-11 DIAGNOSIS — M06.9 RHEUMATOID ARTHRITIS, INVOLVING UNSPECIFIED SITE, UNSPECIFIED WHETHER RHEUMATOID FACTOR PRESENT: ICD-10-CM

## 2023-08-11 DIAGNOSIS — N18.31 STAGE 3A CHRONIC KIDNEY DISEASE: ICD-10-CM

## 2023-08-11 DIAGNOSIS — G47.9 TROUBLE IN SLEEPING: ICD-10-CM

## 2023-08-11 PROCEDURE — 99214 OFFICE O/P EST MOD 30 MIN: CPT | Mod: S$PBB,AQ,, | Performed by: FAMILY MEDICINE

## 2023-08-11 PROCEDURE — 99214 PR OFFICE/OUTPT VISIT, EST, LEVL IV, 30-39 MIN: ICD-10-PCS | Mod: S$PBB,AQ,, | Performed by: FAMILY MEDICINE

## 2023-08-11 PROCEDURE — 99215 OFFICE O/P EST HI 40 MIN: CPT | Performed by: FAMILY MEDICINE

## 2023-08-11 RX ORDER — CARVEDILOL 12.5 MG/1
12.5 TABLET ORAL 2 TIMES DAILY
COMMUNITY
Start: 2023-06-13 | End: 2024-02-08

## 2023-08-11 RX ORDER — CYCLOBENZAPRINE HCL 10 MG
10 TABLET ORAL
COMMUNITY
Start: 2023-07-11

## 2023-08-11 RX ORDER — LISDEXAMFETAMINE DIMESYLATE 50 MG/1
CAPSULE ORAL
Qty: 30 CAPSULE | Refills: 0 | Status: SHIPPED | OUTPATIENT
Start: 2023-08-11 | End: 2023-08-12 | Stop reason: SDUPTHER

## 2023-08-11 NOTE — PROGRESS NOTES
SUBJECTIVE:    Patient ID: Bharath Parr is a 52 y.o. male.    Chief Complaint: ADHD    HPI  Bharath Parr is a 52 y.o. male with a hx of Dilated Cardiomyopathy (EF 45%, followed by Dr. Taylor), HTN, HLD, Hypothyroid, GERD, Obesity, ADHD, SONYA, Fibromyalgia w ?RA (followed by Dr Diana Bran), and Chronic pain (managed by Dr Frazier). Recently also with decreased eGFR. He is being seen for scheduled follow up on ADHD.    Since last OV, he has been seen by Dr Taylor in Cardiology. Note reviewed. Most recent echo with EF 44%, Grade I diastolic dysfunction, mild mitral regurgitation, trace tricuspid regurgitation, and trace pulmonic regurgitation. Continued on Irbesartan, Coreg, Pravastatin and Zetia. He also saw Dr Diana Bran in Rheumatology, whose note was reviewed as well.    Most recent labs include eGFR 52.2, slightly increased from previous 45.3.    He continues to use Vyvanse at 50mg with good results for his ADHD. Denies palpitations, chest pain, or appetite changes. Endorses some trouble sleeping, though does not relate to the medication. Rather, has had a very stressful couple of months due to personal and family medical issues. Does not feel as motivated and is somewhat more anxious but feels that antidepressant is working well. Tends to sleep better when he takes the higher dose of the beta blocker that is prescribed for him.    Patient also reports pain in L achilles that he believes to be tendonitis. Will be going to walk-in clinic for [facility] in [place] to address.      Lab Visit on 05/22/2023   Component Date Value Ref Range Status    CPK 05/22/2023 84  20 - 200 U/L Final    CPK MB 05/22/2023 0.7  0.1 - 6.5 ng/mL Final    Cholesterol 05/22/2023 133  120 - 199 mg/dL Final    Triglycerides 05/22/2023 195 (H)  30 - 150 mg/dL Final    HDL 05/22/2023 35 (L)  40 - 75 mg/dL Final    LDL Cholesterol 05/22/2023 59.0 (L)  63.0 - 159.0 mg/dL Final    HDL/Cholesterol Ratio 05/22/2023 26.3   20.0 - 50.0 % Final    Total Cholesterol/HDL Ratio 05/22/2023 3.8  2.0 - 5.0 Final    Non-HDL Cholesterol 05/22/2023 98  mg/dL Final    Sodium 05/22/2023 134 (L)  136 - 145 mmol/L Final    Potassium 05/22/2023 4.0  3.5 - 5.1 mmol/L Final    Chloride 05/22/2023 101  95 - 110 mmol/L Final    CO2 05/22/2023 26  23 - 29 mmol/L Final    Glucose 05/22/2023 103  70 - 110 mg/dL Final    BUN 05/22/2023 14  6 - 20 mg/dL Final    Creatinine 05/22/2023 1.6 (H)  0.5 - 1.4 mg/dL Final    Calcium 05/22/2023 8.6 (L)  8.7 - 10.5 mg/dL Final    Total Protein 05/22/2023 6.9  6.0 - 8.4 g/dL Final    Albumin 05/22/2023 3.4 (L)  3.5 - 5.2 g/dL Final    Total Bilirubin 05/22/2023 0.7  0.1 - 1.0 mg/dL Final    Alkaline Phosphatase 05/22/2023 77  55 - 135 U/L Final    AST 05/22/2023 17  10 - 40 U/L Final    ALT 05/22/2023 16  10 - 44 U/L Final    Anion Gap 05/22/2023 7 (L)  8 - 16 mmol/L Final    eGFR 05/22/2023 52.2 (A)  >60 mL/min/1.73 m^2 Final   Lab Visit on 05/08/2023   Component Date Value Ref Range Status    WBC 05/08/2023 7.14  3.90 - 12.70 K/uL Final    RBC 05/08/2023 5.22  4.60 - 6.20 M/uL Final    Hemoglobin 05/08/2023 15.2  14.0 - 18.0 g/dL Final    Hematocrit 05/08/2023 47.6  40.0 - 54.0 % Final    MCV 05/08/2023 91  82 - 98 fL Final    MCH 05/08/2023 29.1  27.0 - 31.0 pg Final    MCHC 05/08/2023 31.9 (L)  32.0 - 36.0 g/dL Final    RDW 05/08/2023 13.8  11.5 - 14.5 % Final    Platelets 05/08/2023 242  150 - 450 K/uL Final    MPV 05/08/2023 8.6 (L)  9.2 - 12.9 fL Final    Immature Granulocytes 05/08/2023 0.3  0.0 - 0.5 % Final    Gran # (ANC) 05/08/2023 4.8  1.8 - 7.7 K/uL Final    Immature Grans (Abs) 05/08/2023 0.02  0.00 - 0.04 K/uL Final    Lymph # 05/08/2023 1.2  1.0 - 4.8 K/uL Final    Mono # 05/08/2023 0.8  0.3 - 1.0 K/uL Final    Eos # 05/08/2023 0.2  0.0 - 0.5 K/uL Final    Baso # 05/08/2023 0.05  0.00 - 0.20 K/uL Final    nRBC 05/08/2023 0  0 /100 WBC Final    Gran % 05/08/2023 67.6  38.0 - 73.0 % Final    Lymph %  05/08/2023 16.8 (L)  18.0 - 48.0 % Final    Mono % 05/08/2023 11.5  4.0 - 15.0 % Final    Eosinophil % 05/08/2023 3.1  0.0 - 8.0 % Final    Basophil % 05/08/2023 0.7  0.0 - 1.9 % Final    Differential Method 05/08/2023 Automated   Final    Sodium 05/08/2023 139  136 - 145 mmol/L Final    Potassium 05/08/2023 4.4  3.5 - 5.1 mmol/L Final    Chloride 05/08/2023 102  95 - 110 mmol/L Final    CO2 05/08/2023 29  23 - 29 mmol/L Final    Glucose 05/08/2023 82  70 - 110 mg/dL Final    BUN 05/08/2023 17  6 - 20 mg/dL Final    Creatinine 05/08/2023 1.8 (H)  0.5 - 1.4 mg/dL Final    Calcium 05/08/2023 9.1  8.7 - 10.5 mg/dL Final    Total Protein 05/08/2023 7.5  6.0 - 8.4 g/dL Final    Albumin 05/08/2023 3.7  3.5 - 5.2 g/dL Final    Total Bilirubin 05/08/2023 0.8  0.1 - 1.0 mg/dL Final    Alkaline Phosphatase 05/08/2023 84  55 - 135 U/L Final    AST 05/08/2023 16  10 - 40 U/L Final    ALT 05/08/2023 12  10 - 44 U/L Final    Anion Gap 05/08/2023 8  8 - 16 mmol/L Final    eGFR 05/08/2023 45.3 (A)  >60 mL/min/1.73 m^2 Final    Sed Rate 05/08/2023 2  0 - 10 mm/Hr Final    CRP 05/08/2023 0.21  <0.76 mg/dL Final       Past Medical History:   Diagnosis Date    ADD (attention deficit disorder)     Anxiety     Asthma     intermittent    Cardiomyopathy     Idiopathic    CHF (congestive heart failure)     Hyperlipidemia     Hypertension     Low testosterone     Rheumatoid arthritis, unspecified     Sleep apnea      Past Surgical History:   Procedure Laterality Date    CARDIAC CATHETERIZATION      left arm surgery      FB removal     Family History   Problem Relation Age of Onset    Cancer Mother         cervical    Heart disease Mother     Hypertension Mother     Stroke Father     Cancer Maternal Grandmother         bladder    Macular degeneration Paternal Grandmother     Cancer Paternal Grandfather         skin    Alzheimer's disease Paternal Grandfather        Tobacco History:  reports that he has never smoked. He has never been exposed  "to tobacco smoke. He has never used smokeless tobacco.  Alcohol History:  reports current alcohol use.  Drug History:  reports no history of drug use.    Review of patient's allergies indicates:   Allergen Reactions    Atorvastatin Other (See Comments)     myalgia    Doxycycline      Makes my throat swell    Gabapentin Other (See Comments)     Weakness, fatigue    Lyrica [pregabalin]      Memory loss    Micardis [telmisartan] Other (See Comments)     cough    Promethazine Other (See Comments)     Makes my skin crawl      Tetracyclines Other (See Comments) and Swelling       Current Outpatient Medications:     acetaminophen (TYLENOL) 500 MG tablet, Take 500 mg by mouth every 6 (six) hours as needed for Pain., Disp: , Rfl:     acetaminophen-codeine 300-30mg (TYLENOL #3) 300-30 mg Tab, Take 1 tablet by mouth 3 (three) times daily as needed., Disp: , Rfl:     azelastine-fluticasone (DYMISTA) 137-50 mcg/spray Spry nassal spray, 1 spray by Each Nostril route 2 (two) times daily., Disp: 23 g, Rfl: 5    BD LUER-KRISTIE SYRINGE 3 mL 21 gauge x 1 1/2" Syrg, SMARTSIG:Injection Every 2 Weeks, Disp: , Rfl:     BD REGULAR BEVEL NEEDLES 18 gauge x 1 1/2" Ndle, USE NEEDLES TO DRAW UP TESTOSTERONE EVERY 2 WEEKS, Disp: , Rfl:     buPROPion (WELLBUTRIN SR) 150 MG TBSR 12 hr tablet, Take 1 tablet (150 mg total) by mouth 2 (two) times daily., Disp: 180 tablet, Rfl: 3    carvediloL (COREG) 12.5 MG tablet, Take 12.5 mg by mouth 2 (two) times daily., Disp: , Rfl:     cetirizine (ZYRTEC) 10 MG tablet, Take 10 mg by mouth 2 (two) times daily., Disp: , Rfl:     cyclobenzaprine (FLEXERIL) 10 MG tablet, Take 10 mg by mouth., Disp: , Rfl:     EPINEPHrine (EPIPEN) 0.3 mg/0.3 mL AtIn, Inject contents of epi pen at first sign of severe allergic reaction or anaphylaxis., Disp: 2 each, Rfl: 1    ergocalciferol (ERGOCALCIFEROL) 50,000 unit Cap, Take 50,000 Units by mouth every 7 days., Disp: , Rfl:     ezetimibe (ZETIA) 10 mg tablet, Take 10 mg by mouth " "once daily., Disp: , Rfl: 5    famotidine (PEPCID) 20 MG tablet, Take 20 mg by mouth 2 (two) times daily., Disp: , Rfl:     hydrOXYchloroQUINE (PLAQUENIL) 200 mg tablet, Take 1 tablet (200 mg total) by mouth once daily., Disp: 180 tablet, Rfl: 1    irbesartan (AVAPRO) 75 MG tablet, Take 75 mg by mouth once daily., Disp: , Rfl:     levothyroxine (SYNTHROID) 75 MCG tablet, Take 75 mcg by mouth once daily., Disp: , Rfl:     olopatadine (PATADAY) 0.2 % Drop, INSTILL 1 DROP INTO EACH EYE ONCE DAILY, Disp: 2.5 mL, Rfl: 5    oxyCODONE-acetaminophen (PERCOCET) 7.5-325 mg per tablet, Take 1 tablet by mouth 4 (four) times daily as needed., Disp: , Rfl:     pantoprazole (PROTONIX) 40 MG tablet, Take 1 tablet by mouth once daily, Disp: 90 tablet, Rfl: 0    potassium chloride SA (K-DUR,KLOR-CON) 20 MEQ tablet, Take 20 mEq by mouth as needed. , Disp: , Rfl:     pravastatin (PRAVACHOL) 20 MG tablet, Take 20 mg by mouth once daily., Disp: , Rfl:     testosterone cypionate (DEPOTESTOTERONE CYPIONATE) 200 mg/mL injection, Inject 200 mg into the muscle every 14 (fourteen) days. 05mL every 2 weeks, Disp: , Rfl:     traMADoL (ULTRAM) 50 mg tablet, TAKE 1 TO 2 TABLETS BY MOUTH TWICE DAILY AS DIRECTED, Disp: , Rfl:     carvediloL (COREG) 25 MG tablet, Take 25 mg by mouth 2 (two) times daily., Disp: , Rfl:     mupirocin (BACTROBAN) 2 % ointment, SMARTSI Application Topical 2-3 Times Daily, Disp: , Rfl:     VYVANSE 50 mg capsule, TAKE 1 CAPSULE BY MOUTH ONCE DAILY, Disp: 30 capsule, Rfl: 0    [START ON 9/10/2023] VYVANSE 50 mg capsule, Take 1 capsule (50 mg total) by mouth every morning., Disp: 30 capsule, Rfl: 0    [START ON 10/9/2023] VYVANSE 50 mg capsule, Take 1 capsule (50 mg total) by mouth every morning., Disp: 30 capsule, Rfl: 0    Review of Systems  As in HPI         Objective:      Vitals:    23 0817   BP: 131/87   Pulse: 96   SpO2: 99%   Weight: 110 kg (242 lb 9.6 oz)   Height: 5' 10" (1.778 m)     Physical Exam  Vitals " reviewed.   Constitutional:       General: He is not in acute distress.  Cardiovascular:      Rate and Rhythm: Normal rate and regular rhythm.      Pulses: Normal pulses.      Heart sounds: Normal heart sounds.   Pulmonary:      Effort: Pulmonary effort is normal.      Breath sounds: Normal breath sounds.   Musculoskeletal:      Right lower leg: No edema.      Left lower leg: No edema.   Skin:     General: Skin is warm and dry.   Neurological:      General: No focal deficit present.      Mental Status: He is alert and oriented to person, place, and time.   Psychiatric:         Behavior: Behavior normal.      Comments: Mildly depressed affect              Assessment:       1. Attention deficit hyperactivity disorder (ADHD), unspecified ADHD type    2. Dilated cardiomyopathy    3. Dysthymia    4. Trouble in sleeping    5. Stage 3a chronic kidney disease    6. Rheumatoid arthritis, involving unspecified site, unspecified whether rheumatoid factor present             Plan:       Attention deficit hyperactivity disorder (ADHD), unspecified ADHD type  -     VYVANSE 50 mg capsule; TAKE 1 CAPSULE BY MOUTH ONCE DAILY  Dispense: 30 capsule; Refill: 0  -     VYVANSE 50 mg capsule; Take 1 capsule (50 mg total) by mouth every morning.  Dispense: 30 capsule; Refill: 0  -     VYVANSE 50 mg capsule; Take 1 capsule (50 mg total) by mouth every morning.  Dispense: 30 capsule; Refill: 0    Dilated cardiomyopathy    Dysthymia    Trouble in sleeping    Stage 3a chronic kidney disease    Rheumatoid arthritis, involving unspecified site, unspecified whether rheumatoid factor present      -Continues to do well re: ADHD on same dose Vyvanse. Per pt, Cardiologist continues to be aware that he is on the stimulant and does not have any objections.  -Continue Wellbutrin. Will try using higher dose of Carvedilol more consistently to achieve better sleep and less anxiety, which will in turn hopefully improve mood. Pt will reach out to us if  worsening.  -Reviewed dosing for kidney impairment in UpToDate; no changes necessary for GFR>30.  -Continue follow up with Dr Diana Bran, whose management of rheumatologic issues is appreciated.    No follow-ups on file.        8/13/2023 Josefina Strong M.D.

## 2023-08-12 DIAGNOSIS — F90.9 ATTENTION DEFICIT HYPERACTIVITY DISORDER (ADHD), UNSPECIFIED ADHD TYPE: ICD-10-CM

## 2023-08-13 PROBLEM — N18.31 STAGE 3A CHRONIC KIDNEY DISEASE: Status: ACTIVE | Noted: 2023-08-13

## 2023-08-13 PROBLEM — M06.9 RHEUMATOID ARTHRITIS: Status: ACTIVE | Noted: 2023-01-01

## 2023-08-13 PROBLEM — G47.9 TROUBLE IN SLEEPING: Status: ACTIVE | Noted: 2023-08-13

## 2023-08-13 RX ORDER — LISDEXAMFETAMINE DIMESYLATE 50 MG/1
50 CAPSULE ORAL EVERY MORNING
Qty: 30 CAPSULE | Refills: 0 | Status: SHIPPED | OUTPATIENT
Start: 2023-09-10 | End: 2023-11-09 | Stop reason: DRUGHIGH

## 2023-08-13 RX ORDER — LISDEXAMFETAMINE DIMESYLATE 50 MG/1
50 CAPSULE ORAL EVERY MORNING
Qty: 30 CAPSULE | Refills: 0 | Status: SHIPPED | OUTPATIENT
Start: 2023-10-09 | End: 2023-11-09 | Stop reason: DRUGHIGH

## 2023-08-14 RX ORDER — LISDEXAMFETAMINE DIMESYLATE 50 MG/1
CAPSULE ORAL
Qty: 30 CAPSULE | Refills: 0 | Status: SHIPPED | OUTPATIENT
Start: 2023-08-14 | End: 2023-11-09 | Stop reason: DRUGHIGH

## 2023-08-14 NOTE — TELEPHONE ENCOUNTER
Patient's prescription for Vyvanse was sent to [facility] pharmacy for the month of August. The other 2 prescriptions have the correct  Pharmacy  Prescription is pended to go to [facility].

## 2023-09-14 DIAGNOSIS — J30.2 SEASONAL ALLERGIES: ICD-10-CM

## 2023-09-21 RX ORDER — AZELASTINE HYDROCHLORIDE, FLUTICASONE PROPIONATE 137; 50 UG/1; UG/1
1 SPRAY, METERED NASAL 2 TIMES DAILY
Qty: 23 G | Refills: 0 | Status: SHIPPED | OUTPATIENT
Start: 2023-09-21 | End: 2023-10-30

## 2023-10-27 DIAGNOSIS — J30.2 SEASONAL ALLERGIES: ICD-10-CM

## 2023-10-30 RX ORDER — AZELASTINE HYDROCHLORIDE, FLUTICASONE PROPIONATE 137; 50 UG/1; UG/1
1 SPRAY, METERED NASAL 2 TIMES DAILY
Qty: 23 G | Refills: 0 | Status: SHIPPED | OUTPATIENT
Start: 2023-10-30 | End: 2023-12-28

## 2023-11-06 NOTE — PROGRESS NOTES
"Subjective:      Patient ID: Bharath Parr is a 52 y.o. male.    Chief Complaint: Disease Management    HPI    Rheumatologic History:   - Diagnosis/es:              - Fibromyalgia              - seropositive (+RF, -CCP) nonerosive early rheumatoid arthritis diagnosed in 4/2022   - Positive serologies: RF (45.1)  - Negative serologies: CCP, cryoglobulin, ACE  - Infectious screening labs: Negative hepatitis-B and QuantiFERON 04/2022; negative hepatitis-C 11/2021  - Imaging:              - X-ray bilateral hands (04/12/2022) no obvious erosions   - MRI right hand (6/6/2023) There is some joint space narrowing at the 1st carpal metacarpal and 1st metacarpophalangeal joints but no evidence of synovitis or focal bony erosion. There is tendinosis suggested in the extensor carpi ulnaris tendon at the level of the wrist joint.  - Previous Treatments: -  - Current Treatments:               - HCQ 200mg daily  - Plaquenil eye exam: no plaquenil toxicity 12/2022      Interval History:   He has pain in his right shoulders, hips, and ankles but no joint swelling.     Objective:   BP (!) 140/80   Pulse 96   Ht 5' 10" (1.778 m)   Wt 109 kg (240 lb 4.8 oz)   BMI 34.48 kg/m²   Physical Exam   Constitutional: normal appearance.   HENT:   Head: Normocephalic and atraumatic.   Cardiovascular: Normal rate, regular rhythm and normal heart sounds.   Pulmonary/Chest: Effort normal and breath sounds normal.   Musculoskeletal:      Comments: Normal range of motion of both shoulders  Tenderness to palpation of right biceps tendon  Positive empty can sign, right    Neurological: He is alert.   Skin: Skin is warm and dry. No rash noted.   No skin thickening, telangiectasias, calcinosis, psoriasiform lesions, lupoid lesions       No data to display     Assessment:     1. Rheumatoid arthritis, involving unspecified site, unspecified whether rheumatoid factor present    2. Medication monitoring encounter    3. Long-term use of Plaquenil  "   4. Biceps tendinitis of right shoulder    5. Chronic bursitis of right shoulder    6. Osteoarthritis, unspecified osteoarthritis type, unspecified site      This is a 52-year-old man with history of ADHD, HTN, HLD, idiopathic dilated cardiomyopathy, hypothyroidism, vitamin-D deficiency, GERD, SONYA, tendon tears in both knees after a fall into a fire pit s/p knee surgery on chronic Tramadol per pain management, fibromyalgia, and seropositive (+RF, -CCP) nonerosive early rheumatoid arthritis diagnosed in 4/2022 and on Plaquenil 200mg daily. I do not find evidence of active inflammatory arthritis on exam. I suspect he has right rotator cuff tendinopathy and bursitis and completed right shoulder CSI today.      Plan:     Problem List Items Addressed This Visit          Immunology/Multi System    Rheumatoid arthritis - Primary    Relevant Orders    CBC Auto Differential    Comprehensive Metabolic Panel    Sedimentation rate    C-Reactive Protein     Other Visit Diagnoses       Medication monitoring encounter        Relevant Orders    CBC Auto Differential    Comprehensive Metabolic Panel    Sedimentation rate    C-Reactive Protein    Long-term use of Plaquenil        Relevant Orders    CBC Auto Differential    Comprehensive Metabolic Panel    Sedimentation rate    C-Reactive Protein    Biceps tendinitis of right shoulder        Relevant Orders    Large Joint Aspiration/Injection: R glenohumeral    Chronic bursitis of right shoulder        Relevant Orders    Large Joint Aspiration/Injection: R glenohumeral    Osteoarthritis, unspecified osteoarthritis type, unspecified site        Relevant Orders    Large Joint Aspiration/Injection: R glenohumeral          - Plaquenil 200 mg daily  - Plaquenil eye exam due 12/2023  - Pre DMARD labs due if with plans to escalate therapy    Follow up in 6 months    30 minutes of total time spent on the encounter, which includes face to face time and non-face to face time preparing to see  the patient (eg, review of tests), Obtaining and/or reviewing separately obtained history, Documenting clinical information in the electronic or other health record, Independently interpreting results (not separately reported) and communicating results to the patient/family/caregiver, or Care coordination (not separately reported).     This note was prepared with [employer] Direct voice recognition transcription software. Garbled syntax, mangled pronouns, and other bizarre constructions may be attributed to that software system       Hollie Feliciano M.D.  Rheumatology Dept  [place], [place]

## 2023-11-07 ENCOUNTER — OFFICE VISIT (OUTPATIENT)
Dept: RHEUMATOLOGY | Facility: CLINIC | Age: 53
End: 2023-11-07
Payer: MEDICARE

## 2023-11-07 VITALS
WEIGHT: 240.31 LBS | SYSTOLIC BLOOD PRESSURE: 140 MMHG | DIASTOLIC BLOOD PRESSURE: 80 MMHG | BODY MASS INDEX: 34.4 KG/M2 | HEIGHT: 70 IN | HEART RATE: 96 BPM

## 2023-11-07 DIAGNOSIS — M06.9 RHEUMATOID ARTHRITIS, INVOLVING UNSPECIFIED SITE, UNSPECIFIED WHETHER RHEUMATOID FACTOR PRESENT: Primary | ICD-10-CM

## 2023-11-07 DIAGNOSIS — M75.21 BICEPS TENDINITIS OF RIGHT SHOULDER: ICD-10-CM

## 2023-11-07 DIAGNOSIS — M75.51 CHRONIC BURSITIS OF RIGHT SHOULDER: ICD-10-CM

## 2023-11-07 DIAGNOSIS — Z79.899 LONG-TERM USE OF PLAQUENIL: ICD-10-CM

## 2023-11-07 DIAGNOSIS — Z51.81 MEDICATION MONITORING ENCOUNTER: ICD-10-CM

## 2023-11-07 DIAGNOSIS — M19.90 OSTEOARTHRITIS, UNSPECIFIED OSTEOARTHRITIS TYPE, UNSPECIFIED SITE: ICD-10-CM

## 2023-11-07 PROCEDURE — 99215 OFFICE O/P EST HI 40 MIN: CPT | Mod: PBBFAC,PN | Performed by: STUDENT IN AN ORGANIZED HEALTH CARE EDUCATION/TRAINING PROGRAM

## 2023-11-07 PROCEDURE — 99214 PR OFFICE/OUTPT VISIT, EST, LEVL IV, 30-39 MIN: ICD-10-PCS | Mod: 25,S$PBB,, | Performed by: STUDENT IN AN ORGANIZED HEALTH CARE EDUCATION/TRAINING PROGRAM

## 2023-11-07 PROCEDURE — 99999PBSHW PR PBB SHADOW TECHNICAL ONLY FILED TO HB: Mod: PBBFAC,,,

## 2023-11-07 PROCEDURE — 99999PBSHW PR PBB SHADOW TECHNICAL ONLY FILED TO HB: ICD-10-PCS | Mod: PBBFAC,,,

## 2023-11-07 PROCEDURE — 20610 LARGE JOINT ASPIRATION/INJECTION: R GLENOHUMERAL: ICD-10-PCS | Mod: S$PBB,RT,, | Performed by: STUDENT IN AN ORGANIZED HEALTH CARE EDUCATION/TRAINING PROGRAM

## 2023-11-07 PROCEDURE — 99214 OFFICE O/P EST MOD 30 MIN: CPT | Mod: 25,S$PBB,, | Performed by: STUDENT IN AN ORGANIZED HEALTH CARE EDUCATION/TRAINING PROGRAM

## 2023-11-07 PROCEDURE — 20610 DRAIN/INJ JOINT/BURSA W/O US: CPT | Mod: PBBFAC,PN | Performed by: STUDENT IN AN ORGANIZED HEALTH CARE EDUCATION/TRAINING PROGRAM

## 2023-11-07 PROCEDURE — 99999 PR PBB SHADOW E&M-EST. PATIENT-LVL V: ICD-10-PCS | Mod: PBBFAC,,, | Performed by: STUDENT IN AN ORGANIZED HEALTH CARE EDUCATION/TRAINING PROGRAM

## 2023-11-07 PROCEDURE — 99999 PR PBB SHADOW E&M-EST. PATIENT-LVL V: CPT | Mod: PBBFAC,,, | Performed by: STUDENT IN AN ORGANIZED HEALTH CARE EDUCATION/TRAINING PROGRAM

## 2023-11-07 RX ORDER — TRIAMCINOLONE ACETONIDE 40 MG/ML
40 INJECTION, SUSPENSION INTRA-ARTICULAR; INTRAMUSCULAR
Status: DISCONTINUED | OUTPATIENT
Start: 2023-11-07 | End: 2023-11-07 | Stop reason: HOSPADM

## 2023-11-07 RX ORDER — LIDOCAINE HYDROCHLORIDE 10 MG/ML
4 INJECTION INFILTRATION; PERINEURAL
Status: DISCONTINUED | OUTPATIENT
Start: 2023-11-07 | End: 2023-11-07 | Stop reason: HOSPADM

## 2023-11-07 RX ADMIN — LIDOCAINE HYDROCHLORIDE 4 ML: 10 INJECTION, SOLUTION INFILTRATION; PERINEURAL at 08:11

## 2023-11-07 RX ADMIN — TRIAMCINOLONE ACETONIDE 40 MG: 40 INJECTION, SUSPENSION INTRA-ARTICULAR; INTRAMUSCULAR at 08:11

## 2023-11-07 ASSESSMENT — ROUTINE ASSESSMENT OF PATIENT INDEX DATA (RAPID3)
TOTAL RAPID3 SCORE: 5.78
FATIGUE SCORE: 1.1
PSYCHOLOGICAL DISTRESS SCORE: 2.2
MDHAQ FUNCTION SCORE: 1
PATIENT GLOBAL ASSESSMENT SCORE: 7
PAIN SCORE: 7

## 2023-11-07 NOTE — PROCEDURES
Large Joint Aspiration/Injection: R glenohumeral    Date/Time: 11/7/2023 8:30 AM    Performed by: Hollie Feliciano MD  Authorized by: Hollie Feliciano MD    Consent Done?:  Yes (Verbal)  Indications:  Pain  Site marked: the procedure site was marked    Timeout: prior to procedure the correct patient, procedure, and site was verified      Details:  Needle Size:  25 G  Ultrasonic Guidance for needle placement?: No    Approach:  Posterior  Location:  Shoulder  Site:  R glenohumeral  Medications:  4 mL LIDOcaine HCL 10 mg/ml (1%) 10 mg/mL (1 %); 40 mg triamcinolone acetonide 40 mg/mL  Patient tolerance:  Patient tolerated the procedure well with no immediate complications

## 2023-11-09 ENCOUNTER — OFFICE VISIT (OUTPATIENT)
Dept: FAMILY MEDICINE | Facility: CLINIC | Age: 53
End: 2023-11-09
Payer: MEDICARE

## 2023-11-09 VITALS
HEART RATE: 103 BPM | DIASTOLIC BLOOD PRESSURE: 82 MMHG | BODY MASS INDEX: 34.36 KG/M2 | SYSTOLIC BLOOD PRESSURE: 132 MMHG | WEIGHT: 240 LBS | OXYGEN SATURATION: 98 % | HEIGHT: 70 IN | TEMPERATURE: 98 F

## 2023-11-09 DIAGNOSIS — F34.1 DYSTHYMIA: ICD-10-CM

## 2023-11-09 DIAGNOSIS — I10 PRIMARY HYPERTENSION: ICD-10-CM

## 2023-11-09 DIAGNOSIS — F90.9 ATTENTION DEFICIT HYPERACTIVITY DISORDER (ADHD), UNSPECIFIED ADHD TYPE: Primary | ICD-10-CM

## 2023-11-09 PROCEDURE — 99215 OFFICE O/P EST HI 40 MIN: CPT | Performed by: NURSE PRACTITIONER

## 2023-11-09 PROCEDURE — 99214 PR OFFICE/OUTPT VISIT, EST, LEVL IV, 30-39 MIN: ICD-10-PCS | Mod: S$PBB,,, | Performed by: NURSE PRACTITIONER

## 2023-11-09 PROCEDURE — 99214 OFFICE O/P EST MOD 30 MIN: CPT | Mod: S$PBB,,, | Performed by: NURSE PRACTITIONER

## 2023-11-09 RX ORDER — LISDEXAMFETAMINE DIMESYLATE 60 MG/1
60 CAPSULE ORAL EVERY MORNING
Qty: 30 CAPSULE | Refills: 0 | Status: SHIPPED | OUTPATIENT
Start: 2023-11-09 | End: 2023-12-07 | Stop reason: SDUPTHER

## 2023-11-09 NOTE — PROGRESS NOTES
SUBJECTIVE:      Patient ID: Bharath Parr is a 52 y.o. male.    Chief Complaint: ADHD, Follow-up, and Medication Refill    52-year-old male presents to the clinic for ADHD follow-up. He continues to use Vyvanse at 50 mg, but feels he needs a dosage increase.  He has been at the same dosage for 15 years.  He reports increased stress due to family issues.  He reports his mom is in poor health and is having to be her caregiver.  She has paranoia, which makes things difficulty. Has difficulty completing tasks and staying focused with the increased responsibly. He is sleeping well, often more than needed. Mood waxes and wanes, reports increased anxiety. Denies chest pain, appetite changes, and palpations.         Family History   Problem Relation Age of Onset    Cancer Mother         cervical    Heart disease Mother     Hypertension Mother     Stroke Father     Cancer Maternal Grandmother         bladder    Macular degeneration Paternal Grandmother     Cancer Paternal Grandfather         skin    Alzheimer's disease Paternal Grandfather       Social History     Socioeconomic History    Marital status: Single   Tobacco Use    Smoking status: Never     Passive exposure: Never    Smokeless tobacco: Never   Substance and Sexual Activity    Alcohol use: Yes     Comment: socially    Drug use: No    Sexual activity: Not Currently     Social Determinants of Health     Physical Activity: Insufficiently Active (2/10/2022)    Exercise Vital Sign     Days of Exercise per Week: 3 days     Minutes of Exercise per Session: 20 min   Stress: Stress Concern Present (2/10/2022)    [employer] - Occupational Stress Questionnaire     Feeling of Stress : To some extent     Current Outpatient Medications   Medication Sig Dispense Refill    acetaminophen (TYLENOL) 500 MG tablet Take 500 mg by mouth every 6 (six) hours as needed for Pain.      acetaminophen-codeine 300-30mg (TYLENOL #3) 300-30 mg Tab Take 1  "tablet by mouth 3 (three) times daily as needed.      azelastine-fluticasone (DYMISTA) 137-50 mcg/spray Spry nassal spray Use 1 spray(s) in each nostril twice daily 23 g 0    BD LUER-KRISTIE SYRINGE 3 mL 21 gauge x 1 1/2" Syrg SMARTSIG:Injection Every 2 Weeks      BD REGULAR BEVEL NEEDLES 18 gauge x 1 1/2" Ndle USE NEEDLES TO DRAW UP TESTOSTERONE EVERY 2 WEEKS      buPROPion (WELLBUTRIN SR) 150 MG TBSR 12 hr tablet Take 1 tablet (150 mg total) by mouth 2 (two) times daily. 180 tablet 3    carvediloL (COREG) 12.5 MG tablet Take 12.5 mg by mouth 2 (two) times daily.      carvediloL (COREG) 25 MG tablet Take 25 mg by mouth 2 (two) times daily.      cetirizine (ZYRTEC) 10 MG tablet Take 10 mg by mouth 2 (two) times daily.      cyclobenzaprine (FLEXERIL) 10 MG tablet Take 10 mg by mouth.      EPINEPHrine (EPIPEN) 0.3 mg/0.3 mL AtIn Inject contents of epi pen at first sign of severe allergic reaction or anaphylaxis. 2 each 1    ergocalciferol (ERGOCALCIFEROL) 50,000 unit Cap Take 50,000 Units by mouth every 7 days.      ezetimibe (ZETIA) 10 mg tablet Take 10 mg by mouth once daily.  5    famotidine (PEPCID) 20 MG tablet Take 20 mg by mouth 2 (two) times daily.      hydrOXYchloroQUINE (PLAQUENIL) 200 mg tablet Take 1 tablet (200 mg total) by mouth once daily. 180 tablet 1    irbesartan (AVAPRO) 75 MG tablet Take 75 mg by mouth once daily.      levothyroxine (SYNTHROID) 75 MCG tablet Take 75 mcg by mouth once daily.      mupirocin (BACTROBAN) 2 % ointment SMARTSI Application Topical 2-3 Times Daily      olopatadine (PATADAY) 0.2 % Drop INSTILL 1 DROP INTO EACH EYE ONCE DAILY 2.5 mL 5    pantoprazole (PROTONIX) 40 MG tablet Take 1 tablet by mouth once daily 90 tablet 0    potassium chloride SA (K-DUR,KLOR-CON) 20 MEQ tablet Take 20 mEq by mouth as needed.       pravastatin (PRAVACHOL) 20 MG tablet Take 20 mg by mouth once daily.      testosterone cypionate (DEPOTESTOTERONE CYPIONATE) 200 mg/mL injection Inject 200 mg into the " muscle every 14 (fourteen) days. 05mL every 2 weeks      traMADoL (ULTRAM) 50 mg tablet TAKE 1 TO 2 TABLETS BY MOUTH TWICE DAILY AS DIRECTED      lisdexamfetamine (VYVANSE) 60 MG capsule Take 1 capsule (60 mg total) by mouth every morning. 30 capsule 0     No current facility-administered medications for this visit.     Review of patient's allergies indicates:   Allergen Reactions    Atorvastatin Other (See Comments)     myalgia    Doxycycline      Makes my throat swell    Gabapentin Other (See Comments)     Weakness, fatigue    Lyrica [pregabalin]      Memory loss    Micardis [telmisartan] Other (See Comments)     cough    Promethazine Other (See Comments)     Makes my skin crawl      Tetracyclines Other (See Comments) and Swelling      Past Medical History:   Diagnosis Date    ADD (attention deficit disorder)     Anxiety     Asthma     intermittent    Cardiomyopathy     Idiopathic    CHF (congestive heart failure)     Hyperlipidemia     Hypertension     Low testosterone     Rheumatoid arthritis, unspecified     Sleep apnea      Past Surgical History:   Procedure Laterality Date    CARDIAC CATHETERIZATION      left arm surgery      FB removal       Review of Systems   Constitutional:  Positive for fatigue. Negative for activity change, appetite change, chills, diaphoresis, fever and unexpected weight change.   HENT:  Negative for congestion, ear pain, sinus pressure, sore throat, trouble swallowing and voice change.    Eyes:  Negative for pain, discharge and visual disturbance.   Respiratory:  Negative for cough, chest tightness, shortness of breath and wheezing.    Cardiovascular:  Negative for chest pain and palpitations.   Gastrointestinal:  Negative for abdominal pain, constipation, diarrhea, nausea and vomiting.   Genitourinary:  Negative for difficulty urinating, flank pain, frequency and urgency.   Musculoskeletal:  Positive for arthralgias. Negative for back pain and joint swelling.   Skin:  Negative for  "color change and rash.   Neurological:  Negative for dizziness, seizures, syncope, weakness, numbness and headaches.   Hematological:  Negative for adenopathy.   Psychiatric/Behavioral:  Positive for decreased concentration and dysphoric mood. Negative for sleep disturbance and suicidal ideas. The patient is nervous/anxious.       OBJECTIVE:      Vitals:    11/09/23 1022   BP: 132/82   BP Location: Left arm   Patient Position: Sitting   BP Method: Large (Automatic)   Pulse: 103   Temp: 98.2 °F (36.8 °C)   TempSrc: Oral   SpO2: 98%   Weight: 108.9 kg (240 lb)   Height: 5' 10" (1.778 m)     Physical Exam  Vitals and nursing note reviewed.   Constitutional:       General: He is awake. He is not in acute distress.     Appearance: Normal appearance. He is obese. He is not ill-appearing, toxic-appearing or diaphoretic.   HENT:      Head: Normocephalic and atraumatic.      Nose: Nose normal.   Eyes:      General: Lids are normal. Gaze aligned appropriately.      Conjunctiva/sclera: Conjunctivae normal.      Right eye: Right conjunctiva is not injected.      Left eye: Left conjunctiva is not injected.      Pupils: Pupils are equal, round, and reactive to light.   Cardiovascular:      Rate and Rhythm: Regular rhythm. Tachycardia present.      Pulses: Normal pulses.      Heart sounds: Normal heart sounds, S1 normal and S2 normal. No murmur heard.     No friction rub. No gallop.   Pulmonary:      Effort: Pulmonary effort is normal. No respiratory distress.      Breath sounds: Normal breath sounds. No stridor. No decreased breath sounds, wheezing, rhonchi or rales.   Chest:      Chest wall: No tenderness.   Musculoskeletal:      Cervical back: Neck supple.      Right lower leg: No edema.      Left lower leg: No edema.   Lymphadenopathy:      Cervical: No cervical adenopathy.   Skin:     General: Skin is warm and dry.      Capillary Refill: Capillary refill takes less than 2 seconds.      Findings: No erythema or rash. "   Neurological:      Mental Status: He is alert and oriented to person, place, and time. Mental status is at baseline.   Psychiatric:         Attention and Perception: Attention normal.         Mood and Affect: Mood is anxious.         Speech: Speech normal.         Behavior: Behavior normal. Behavior is cooperative.         Thought Content: Thought content normal.         Judgment: Judgment normal.        Assessment:       1. Attention deficit hyperactivity disorder (ADHD), unspecified ADHD type    2. Primary hypertension    3. Dysthymia        Plan:       Attention deficit hyperactivity disorder (ADHD), unspecified ADHD type  Vyvanse increased.  Patient informed his PCP may change him back to 50 mg if she sees fit.  reviewed.  Follow-up with PCP in 3 months.   -     lisdexamfetamine (VYVANSE) 60 MG capsule; Take 1 capsule (60 mg total) by mouth every morning.  Dispense: 30 capsule; Refill: 0    Primary hypertension  Repeat BP stable, continue current meds.  Reduce the amount of salt in your diet; Lose weight; Avoid drinking too much alcohol; Exercise at least 30 minutes per day most days of the week.      Dysthymia  Depression from being a caregiver may be contributing to his symptoms. Vyvanse was increased, but would be hesitant to increase further until depression/anxiety is more stable.     This note was created using [employer] voice recognition software that occasionally misinterprets phrases or words.     I spent a total of 21 minutes on the day of the visit.This includes face to face time and non-face to face time preparing to see the patient (eg, review of tests), obtaining and/or reviewing separately obtained history, documenting clinical information in the electronic or other health record, independently interpreting results and communicating results to the patient/family/caregiver, or care coordinator.    Follow up in about 3 months (around 2/9/2024) for ADHD.      11/9/2023 BETY Biggs,  FNP

## 2023-12-07 DIAGNOSIS — F90.9 ATTENTION DEFICIT HYPERACTIVITY DISORDER (ADHD), UNSPECIFIED ADHD TYPE: ICD-10-CM

## 2023-12-07 RX ORDER — LISDEXAMFETAMINE DIMESYLATE 60 MG/1
60 CAPSULE ORAL EVERY MORNING
Qty: 30 CAPSULE | Refills: 0 | Status: SHIPPED | OUTPATIENT
Start: 2023-12-07 | End: 2024-01-09 | Stop reason: SDUPTHER

## 2023-12-28 DIAGNOSIS — J30.2 SEASONAL ALLERGIES: ICD-10-CM

## 2023-12-28 DIAGNOSIS — Z88.9 HISTORY OF SEVERE ALLERGIC REACTION: ICD-10-CM

## 2023-12-28 RX ORDER — AZELASTINE HYDROCHLORIDE, FLUTICASONE PROPIONATE 137; 50 UG/1; UG/1
1 SPRAY, METERED NASAL 2 TIMES DAILY
Qty: 23 G | Refills: 0 | Status: SHIPPED | OUTPATIENT
Start: 2023-12-28 | End: 2024-01-18

## 2023-12-28 RX ORDER — EPINEPHRINE 0.3 MG/.3ML
INJECTION SUBCUTANEOUS
Qty: 2 EACH | Refills: 0 | Status: SHIPPED | OUTPATIENT
Start: 2023-12-28

## 2024-01-09 ENCOUNTER — PATIENT MESSAGE (OUTPATIENT)
Dept: FAMILY MEDICINE | Facility: CLINIC | Age: 54
End: 2024-01-09
Payer: MEDICARE

## 2024-01-09 DIAGNOSIS — F90.9 ATTENTION DEFICIT HYPERACTIVITY DISORDER (ADHD), UNSPECIFIED ADHD TYPE: ICD-10-CM

## 2024-01-10 RX ORDER — LISDEXAMFETAMINE DIMESYLATE CAPSULES 60 MG/1
60 CAPSULE ORAL EVERY MORNING
Qty: 30 CAPSULE | Refills: 0 | Status: SHIPPED | OUTPATIENT
Start: 2024-01-10 | End: 2024-02-08 | Stop reason: SDUPTHER

## 2024-01-11 DIAGNOSIS — Z00.00 ENCOUNTER FOR MEDICARE ANNUAL WELLNESS EXAM: ICD-10-CM

## 2024-01-17 DIAGNOSIS — J30.2 SEASONAL ALLERGIES: ICD-10-CM

## 2024-01-18 RX ORDER — AZELASTINE HYDROCHLORIDE, FLUTICASONE PROPIONATE 137; 50 UG/1; UG/1
1 SPRAY, METERED NASAL 2 TIMES DAILY
Qty: 23 G | Refills: 2 | Status: SHIPPED | OUTPATIENT
Start: 2024-01-18 | End: 2024-04-01

## 2024-01-30 ENCOUNTER — TELEPHONE (OUTPATIENT)
Dept: FAMILY MEDICINE | Facility: CLINIC | Age: 54
End: 2024-01-30
Payer: MEDICARE

## 2024-02-08 ENCOUNTER — OFFICE VISIT (OUTPATIENT)
Dept: FAMILY MEDICINE | Facility: CLINIC | Age: 54
End: 2024-02-08
Payer: MEDICARE

## 2024-02-08 VITALS
BODY MASS INDEX: 33.36 KG/M2 | OXYGEN SATURATION: 98 % | HEART RATE: 97 BPM | TEMPERATURE: 98 F | WEIGHT: 233 LBS | SYSTOLIC BLOOD PRESSURE: 144 MMHG | HEIGHT: 70 IN | DIASTOLIC BLOOD PRESSURE: 90 MMHG

## 2024-02-08 DIAGNOSIS — R42 VERTIGO: ICD-10-CM

## 2024-02-08 DIAGNOSIS — I10 PRIMARY HYPERTENSION: ICD-10-CM

## 2024-02-08 DIAGNOSIS — F90.9 ATTENTION DEFICIT HYPERACTIVITY DISORDER (ADHD), UNSPECIFIED ADHD TYPE: Primary | ICD-10-CM

## 2024-02-08 PROCEDURE — 99214 OFFICE O/P EST MOD 30 MIN: CPT | Mod: S$PBB,,, | Performed by: NURSE PRACTITIONER

## 2024-02-08 PROCEDURE — 99999 PR PBB SHADOW E&M-EST. PATIENT-LVL V: CPT | Mod: PBBFAC,,, | Performed by: NURSE PRACTITIONER

## 2024-02-08 PROCEDURE — 99215 OFFICE O/P EST HI 40 MIN: CPT | Mod: PBBFAC,PN | Performed by: NURSE PRACTITIONER

## 2024-02-08 RX ORDER — MECLIZINE HYDROCHLORIDE 25 MG/1
25 TABLET ORAL 3 TIMES DAILY PRN
Qty: 30 TABLET | Refills: 0 | Status: SHIPPED | OUTPATIENT
Start: 2024-02-08 | End: 2024-02-28

## 2024-02-08 RX ORDER — IRBESARTAN 150 MG/1
150 TABLET ORAL NIGHTLY
Qty: 90 TABLET | Refills: 1 | Status: SHIPPED | OUTPATIENT
Start: 2024-02-08

## 2024-02-08 RX ORDER — LISDEXAMFETAMINE DIMESYLATE 60 MG/1
60 CAPSULE ORAL EVERY MORNING
Qty: 30 CAPSULE | Refills: 0 | Status: SHIPPED | OUTPATIENT
Start: 2024-02-08 | End: 2024-03-07 | Stop reason: SDUPTHER

## 2024-02-27 DIAGNOSIS — R42 VERTIGO: ICD-10-CM

## 2024-02-28 RX ORDER — MECLIZINE HYDROCHLORIDE 25 MG/1
25 TABLET ORAL
Qty: 30 TABLET | Refills: 0 | Status: SHIPPED | OUTPATIENT
Start: 2024-02-28

## 2024-03-07 DIAGNOSIS — F90.9 ATTENTION DEFICIT HYPERACTIVITY DISORDER (ADHD), UNSPECIFIED ADHD TYPE: ICD-10-CM

## 2024-03-08 RX ORDER — LISDEXAMFETAMINE DIMESYLATE 60 MG/1
60 CAPSULE ORAL EVERY MORNING
Qty: 30 CAPSULE | Refills: 0 | Status: SHIPPED | OUTPATIENT
Start: 2024-03-08 | End: 2024-04-05 | Stop reason: SDUPTHER

## 2024-03-29 DIAGNOSIS — J30.2 SEASONAL ALLERGIES: ICD-10-CM

## 2024-04-01 RX ORDER — AZELASTINE HYDROCHLORIDE, FLUTICASONE PROPIONATE 137; 50 UG/1; UG/1
1 SPRAY, METERED NASAL 2 TIMES DAILY
Qty: 23 G | Refills: 0 | Status: SHIPPED | OUTPATIENT
Start: 2024-04-01

## 2024-04-04 DIAGNOSIS — M06.9 RHEUMATOID ARTHRITIS, INVOLVING UNSPECIFIED SITE, UNSPECIFIED WHETHER RHEUMATOID FACTOR PRESENT: Primary | ICD-10-CM

## 2024-04-04 RX ORDER — HYDROXYCHLOROQUINE SULFATE 200 MG/1
200 TABLET, FILM COATED ORAL DAILY
Qty: 90 TABLET | Refills: 1 | Status: SHIPPED | OUTPATIENT
Start: 2024-04-04 | End: 2024-04-05 | Stop reason: SDUPTHER

## 2024-04-04 RX ORDER — HYDROXYCHLOROQUINE SULFATE 200 MG/1
200 TABLET, FILM COATED ORAL DAILY
Qty: 180 TABLET | Refills: 1 | Status: SHIPPED | OUTPATIENT
Start: 2024-04-04 | End: 2024-04-04

## 2024-04-05 ENCOUNTER — PATIENT MESSAGE (OUTPATIENT)
Dept: RHEUMATOLOGY | Facility: CLINIC | Age: 54
End: 2024-04-05
Payer: MEDICARE

## 2024-04-05 DIAGNOSIS — F90.9 ATTENTION DEFICIT HYPERACTIVITY DISORDER (ADHD), UNSPECIFIED ADHD TYPE: ICD-10-CM

## 2024-04-05 DIAGNOSIS — M06.9 RHEUMATOID ARTHRITIS, INVOLVING UNSPECIFIED SITE, UNSPECIFIED WHETHER RHEUMATOID FACTOR PRESENT: ICD-10-CM

## 2024-04-05 DIAGNOSIS — M06.9 RHEUMATOID ARTHRITIS, INVOLVING UNSPECIFIED SITE, UNSPECIFIED WHETHER RHEUMATOID FACTOR PRESENT: Primary | ICD-10-CM

## 2024-04-05 RX ORDER — METHYLPREDNISOLONE 4 MG/1
TABLET ORAL
Qty: 21 EACH | Refills: 1 | Status: SHIPPED | OUTPATIENT
Start: 2024-04-05 | End: 2024-04-26

## 2024-04-05 RX ORDER — HYDROXYCHLOROQUINE SULFATE 200 MG/1
200 TABLET, FILM COATED ORAL DAILY
Qty: 90 TABLET | Refills: 1 | Status: SHIPPED | OUTPATIENT
Start: 2024-04-05

## 2024-04-08 RX ORDER — LISDEXAMFETAMINE DIMESYLATE 60 MG/1
60 CAPSULE ORAL EVERY MORNING
Qty: 30 CAPSULE | Refills: 0 | Status: SHIPPED | OUTPATIENT
Start: 2024-04-08 | End: 2024-05-06 | Stop reason: SDUPTHER

## 2024-04-30 ENCOUNTER — LAB VISIT (OUTPATIENT)
Dept: LAB | Facility: HOSPITAL | Age: 54
End: 2024-04-30
Attending: STUDENT IN AN ORGANIZED HEALTH CARE EDUCATION/TRAINING PROGRAM
Payer: MEDICARE

## 2024-04-30 DIAGNOSIS — M06.9 RHEUMATOID ARTHRITIS, INVOLVING UNSPECIFIED SITE, UNSPECIFIED WHETHER RHEUMATOID FACTOR PRESENT: ICD-10-CM

## 2024-04-30 DIAGNOSIS — Z79.899 LONG-TERM USE OF PLAQUENIL: ICD-10-CM

## 2024-04-30 DIAGNOSIS — Z51.81 MEDICATION MONITORING ENCOUNTER: ICD-10-CM

## 2024-04-30 LAB
ALBUMIN SERPL BCP-MCNC: 3.9 G/DL (ref 3.5–5.2)
ALP SERPL-CCNC: 66 U/L (ref 55–135)
ALT SERPL W/O P-5'-P-CCNC: 12 U/L (ref 10–44)
ANION GAP SERPL CALC-SCNC: 6 MMOL/L (ref 8–16)
AST SERPL-CCNC: 10 U/L (ref 10–40)
BASOPHILS # BLD AUTO: 0.03 K/UL (ref 0–0.2)
BASOPHILS NFR BLD: 0.3 % (ref 0–1.9)
BILIRUB SERPL-MCNC: 0.5 MG/DL (ref 0.1–1)
BUN SERPL-MCNC: 16 MG/DL (ref 6–20)
CALCIUM SERPL-MCNC: 8.6 MG/DL (ref 8.7–10.5)
CHLORIDE SERPL-SCNC: 100 MMOL/L (ref 95–110)
CO2 SERPL-SCNC: 29 MMOL/L (ref 23–29)
CREAT SERPL-MCNC: 1.1 MG/DL (ref 0.5–1.4)
CRP SERPL-MCNC: 0.1 MG/DL
DIFFERENTIAL METHOD BLD: ABNORMAL
EOSINOPHIL # BLD AUTO: 0 K/UL (ref 0–0.5)
EOSINOPHIL NFR BLD: 0.4 % (ref 0–8)
ERYTHROCYTE [DISTWIDTH] IN BLOOD BY AUTOMATED COUNT: 15.2 % (ref 11.5–14.5)
ERYTHROCYTE [SEDIMENTATION RATE] IN BLOOD BY WESTERGREN METHOD: 13 MM/HR (ref 0–10)
EST. GFR  (NO RACE VARIABLE): >60 ML/MIN/1.73 M^2
GLUCOSE SERPL-MCNC: 126 MG/DL (ref 70–110)
HCT VFR BLD AUTO: 46.3 % (ref 40–54)
HGB BLD-MCNC: 14.8 G/DL (ref 14–18)
IMM GRANULOCYTES # BLD AUTO: 0.04 K/UL (ref 0–0.04)
IMM GRANULOCYTES NFR BLD AUTO: 0.4 % (ref 0–0.5)
LYMPHOCYTES # BLD AUTO: 2.1 K/UL (ref 1–4.8)
LYMPHOCYTES NFR BLD: 22.2 % (ref 18–48)
MCH RBC QN AUTO: 29.3 PG (ref 27–31)
MCHC RBC AUTO-ENTMCNC: 32 G/DL (ref 32–36)
MCV RBC AUTO: 92 FL (ref 82–98)
MONOCYTES # BLD AUTO: 0.9 K/UL (ref 0.3–1)
MONOCYTES NFR BLD: 9.7 % (ref 4–15)
NEUTROPHILS # BLD AUTO: 6.4 K/UL (ref 1.8–7.7)
NEUTROPHILS NFR BLD: 67 % (ref 38–73)
NRBC BLD-RTO: 0 /100 WBC
PLATELET # BLD AUTO: 296 K/UL (ref 150–450)
PMV BLD AUTO: 8.7 FL (ref 9.2–12.9)
POTASSIUM SERPL-SCNC: 3.3 MMOL/L (ref 3.5–5.1)
PROT SERPL-MCNC: 6.7 G/DL (ref 6–8.4)
RBC # BLD AUTO: 5.05 M/UL (ref 4.6–6.2)
SODIUM SERPL-SCNC: 135 MMOL/L (ref 136–145)
WBC # BLD AUTO: 9.54 K/UL (ref 3.9–12.7)

## 2024-04-30 PROCEDURE — 36415 COLL VENOUS BLD VENIPUNCTURE: CPT | Performed by: STUDENT IN AN ORGANIZED HEALTH CARE EDUCATION/TRAINING PROGRAM

## 2024-04-30 PROCEDURE — 85651 RBC SED RATE NONAUTOMATED: CPT | Performed by: STUDENT IN AN ORGANIZED HEALTH CARE EDUCATION/TRAINING PROGRAM

## 2024-04-30 PROCEDURE — 80053 COMPREHEN METABOLIC PANEL: CPT | Performed by: STUDENT IN AN ORGANIZED HEALTH CARE EDUCATION/TRAINING PROGRAM

## 2024-04-30 PROCEDURE — 85025 COMPLETE CBC W/AUTO DIFF WBC: CPT | Performed by: STUDENT IN AN ORGANIZED HEALTH CARE EDUCATION/TRAINING PROGRAM

## 2024-04-30 PROCEDURE — 86140 C-REACTIVE PROTEIN: CPT | Performed by: STUDENT IN AN ORGANIZED HEALTH CARE EDUCATION/TRAINING PROGRAM

## 2024-05-06 ENCOUNTER — OFFICE VISIT (OUTPATIENT)
Dept: FAMILY MEDICINE | Facility: CLINIC | Age: 54
End: 2024-05-06
Payer: MEDICARE

## 2024-05-06 VITALS
BODY MASS INDEX: 32.64 KG/M2 | OXYGEN SATURATION: 98 % | SYSTOLIC BLOOD PRESSURE: 124 MMHG | DIASTOLIC BLOOD PRESSURE: 82 MMHG | HEIGHT: 70 IN | HEART RATE: 101 BPM | WEIGHT: 228 LBS

## 2024-05-06 DIAGNOSIS — F90.9 ATTENTION DEFICIT HYPERACTIVITY DISORDER (ADHD), UNSPECIFIED ADHD TYPE: Primary | ICD-10-CM

## 2024-05-06 DIAGNOSIS — M25.561 CHRONIC PAIN OF RIGHT KNEE: ICD-10-CM

## 2024-05-06 DIAGNOSIS — M06.9 RHEUMATOID ARTHRITIS, INVOLVING UNSPECIFIED SITE, UNSPECIFIED WHETHER RHEUMATOID FACTOR PRESENT: ICD-10-CM

## 2024-05-06 DIAGNOSIS — I10 PRIMARY HYPERTENSION: ICD-10-CM

## 2024-05-06 DIAGNOSIS — N18.31 STAGE 3A CHRONIC KIDNEY DISEASE: ICD-10-CM

## 2024-05-06 DIAGNOSIS — G89.29 CHRONIC PAIN OF RIGHT KNEE: ICD-10-CM

## 2024-05-06 DIAGNOSIS — Z88.9 HISTORY OF SEVERE ALLERGIC REACTION: ICD-10-CM

## 2024-05-06 PROBLEM — E66.01 SEVERE OBESITY (BMI 35.0-35.9 WITH COMORBIDITY): Status: RESOLVED | Noted: 2021-01-06 | Resolved: 2024-05-06

## 2024-05-06 PROCEDURE — 99214 OFFICE O/P EST MOD 30 MIN: CPT | Mod: S$PBB,,, | Performed by: NURSE PRACTITIONER

## 2024-05-06 PROCEDURE — 99999 PR PBB SHADOW E&M-EST. PATIENT-LVL III: CPT | Mod: PBBFAC,,, | Performed by: NURSE PRACTITIONER

## 2024-05-06 PROCEDURE — 99213 OFFICE O/P EST LOW 20 MIN: CPT | Mod: PBBFAC,PN | Performed by: NURSE PRACTITIONER

## 2024-05-06 RX ORDER — EPINEPHRINE 0.3 MG/.3ML
INJECTION SUBCUTANEOUS
Qty: 2 EACH | Refills: 0 | Status: SHIPPED | OUTPATIENT
Start: 2024-05-06

## 2024-05-06 RX ORDER — LISDEXAMFETAMINE DIMESYLATE 60 MG/1
60 CAPSULE ORAL EVERY MORNING
Qty: 30 CAPSULE | Refills: 0 | Status: SHIPPED | OUTPATIENT
Start: 2024-05-06 | End: 2024-06-07 | Stop reason: SDUPTHER

## 2024-05-06 NOTE — PROGRESS NOTES
"Subjective:      Patient ID: Bharath Parr is a 53 y.o. male.    Chief Complaint: Disease Management    HPI    Rheumatologic History:   - Diagnosis/es:              - Fibromyalgia              - seropositive (+RF, -CCP) nonerosive early rheumatoid arthritis diagnosed in 4/2022   - Positive serologies: RF (45.1)  - Negative serologies: CCP, cryoglobulin, ACE  - Infectious screening labs: Negative hepatitis-B and QuantiFERON 04/2022; negative hepatitis-C 11/2021  - Imaging:              - X-ray bilateral hands (04/12/2022) no obvious erosions              - MRI right hand (6/6/2023) There is some joint space narrowing at the 1st carpal metacarpal and 1st metacarpophalangeal joints but no evidence of synovitis or focal bony erosion. There is tendinosis suggested in the extensor carpi ulnaris tendon at the level of the wrist joint.  - Previous Treatments: -  - Current Treatments:               - HCQ 200mg daily  - Plaquenil eye exam: no plaquenil toxicity 1/2024     Interval History:   At last visit, I injected his right shoulder. Currently, he reports pain in his right knee and back. He sees pain management and is scheduled to see orthopedic surgery. His other joints are doing well.     Objective:   BP (!) 118/52   Pulse 96   Ht 5' 10" (1.778 m)   Wt 104 kg (229 lb 4.5 oz)   BMI 32.90 kg/m²   Physical Exam   Constitutional: normal appearance.   HENT:   Head: Normocephalic and atraumatic.   Cardiovascular: Normal rate, regular rhythm and normal heart sounds.   Pulmonary/Chest: Effort normal and breath sounds normal.   Musculoskeletal:      Comments: Trace cool effusions of the knees  Crepitus on ROM of the left knee  Scars on both knees   Neurological: He is alert.   Skin: Skin is warm and dry. No rash noted.   No skin thickening, telangiectasias, calcinosis, psoriasiform lesions, lupoid lesions         No data to display    Labs independently reviewed by me (4/30/24)   CBC WBC, HGB, PLT WNL   CMP CR 1.1, LFTs " WNL   CRP WNL   ESR 13 <- 2    Assessment:     1. Rheumatoid arthritis, involving unspecified site, unspecified whether rheumatoid factor present    2. Medication monitoring encounter    3. Long-term use of Plaquenil    4. Osteoarthritis, unspecified osteoarthritis type, unspecified site    5. Fibromyalgia      This is a 53-year-old man with history of ADHD, HTN, HLD, idiopathic dilated cardiomyopathy, hypothyroidism, vitamin-D deficiency, GERD, SONYA, tendon tears in both knees after a fall into a fire pit s/p knee surgery on chronic Tramadol per pain management, fibromyalgia, and seropositive (+RF, -CCP) nonerosive early rheumatoid arthritis diagnosed in 4/2022 and on Plaquenil 200mg daily. His RA is well controlled. Continue Plaquenil,     Plan:     Problem List Items Addressed This Visit          ID    Long-term use of Plaquenil    Relevant Orders    C-Reactive Protein    CBC Auto Differential    Creatinine, Serum    ALT (SGPT)    AST (SGOT)    Sedimentation rate       Immunology/Multi System    Rheumatoid arthritis - Primary    Relevant Orders    C-Reactive Protein    CBC Auto Differential    Creatinine, Serum    ALT (SGPT)    AST (SGOT)    Sedimentation rate       Orthopedic    Fibromyalgia    Osteoarthritis     Other Visit Diagnoses       Medication monitoring encounter        Relevant Orders    C-Reactive Protein    CBC Auto Differential    Creatinine, Serum    ALT (SGPT)    AST (SGOT)    Sedimentation rate          - Plaquenil 200 mg daily  - Plaquenil eye exam due 1/2025  - Pre DMARD labs due if with plans to escalate therapy    Follow up in 6 months    30 minutes of total time spent on the encounter, which includes face to face time and non-face to face time preparing to see the patient (eg, review of tests), Obtaining and/or reviewing separately obtained history, Documenting clinical information in the electronic or other health record, Independently interpreting results (not separately reported) and  communicating results to the patient/family/caregiver, or Care coordination (not separately reported).     This note was prepared with [employer] Direct voice recognition transcription software. Garbled syntax, mangled pronouns, and other bizarre constructions may be attributed to that software system       Hollie Feliciano M.D.  Rheumatology Dept  [place], [place]

## 2024-05-06 NOTE — PROGRESS NOTES
SUBJECTIVE:      Patient ID: Bharath Parr is a 53 y.o. male.    Chief Complaint: Follow-up and Medication Refill    53-year-old male presents to the clinic for ADHD and hypertension follow-up.     ADHD is managed with Vyvanse 60 mg. He needs the medication to stay focused. Doing well at the current dosage. He is sleeping well at night and mood is stable. BP is controlled. Denies chest pain, palpations, and SOB.    Blood pressure was elevated at the last office visit. Irbesartan was increased from 75 mg to 150 mg. He saw the cardiologist 1 week later. BP was still high and irbesartan was increased to 150 mg bid. He continues to take Coreg 25 mg bid as well. Blood pressure is controlled, 124/82. Denies ROSAMARIA.    Requesting a refill of an epi pen. He has an allergy to mold and the allergist told him to keep an epi pen on hand. He has never had to use the epi pen in the past.     Would like a second opinion about his right knee. Hx of partial knee replacement in 2020 by Dr. Cash. Still having pain to knee since the procedure. Denies new injury/trauma.         Family History   Problem Relation Name Age of Onset    Cancer Mother          cervical    Heart disease Mother      Hypertension Mother      Stroke Father      Cancer Maternal Grandmother          bladder    Macular degeneration Paternal Grandmother      Cancer Paternal Grandfather          skin    Alzheimer's disease Paternal Grandfather        Social History     Socioeconomic History    Marital status: Single   Tobacco Use    Smoking status: Never     Passive exposure: Never    Smokeless tobacco: Never   Substance and Sexual Activity    Alcohol use: Yes     Comment: socially    Drug use: No    Sexual activity: Not Currently     Social Determinants of Health     Physical Activity: Insufficiently Active (2/10/2022)    Exercise Vital Sign     Days of Exercise per Week: 3 days     Minutes of Exercise per Session: 20 min   Stress: Stress Concern Present  "(2/10/2022)    [employer] - Occupational Stress Questionnaire     Feeling of Stress : To some extent     Current Outpatient Medications   Medication Sig Dispense Refill    acetaminophen (TYLENOL) 500 MG tablet Take 500 mg by mouth every 6 (six) hours as needed for Pain.      acetaminophen-codeine 300-30mg (TYLENOL #3) 300-30 mg Tab Take 1 tablet by mouth 3 (three) times daily as needed.      azelastine-fluticasone (DYMISTA) 137-50 mcg/spray Spry nassal spray USE 1 SPRAY IN EACH NOSTRIL TWICE DAILY 23 g 0    BD LUER-KRISTIE SYRINGE 3 mL 21 gauge x 1 1/2" Syrg SMARTSIG:Injection Every 2 Weeks      BD REGULAR BEVEL NEEDLES 18 gauge x 1 1/2" Ndle USE NEEDLES TO DRAW UP TESTOSTERONE EVERY 2 WEEKS      buPROPion (WELLBUTRIN SR) 150 MG TBSR 12 hr tablet Take 1 tablet (150 mg total) by mouth 2 (two) times daily. 180 tablet 3    carvediloL (COREG) 25 MG tablet Take 25 mg by mouth 2 (two) times daily.      cetirizine (ZYRTEC) 10 MG tablet Take 10 mg by mouth 2 (two) times daily.      cyclobenzaprine (FLEXERIL) 10 MG tablet Take 10 mg by mouth.      ergocalciferol (ERGOCALCIFEROL) 50,000 unit Cap Take 50,000 Units by mouth every 7 days.      ezetimibe (ZETIA) 10 mg tablet Take 10 mg by mouth once daily.  5    famotidine (PEPCID) 20 MG tablet Take 20 mg by mouth 2 (two) times daily.      hydroxychloroquine (PLAQUENIL) 200 mg tablet Take 1 tablet (200 mg total) by mouth once daily. 90 tablet 1    irbesartan (AVAPRO) 150 MG tablet Take 1 tablet (150 mg total) by mouth every evening. 90 tablet 1    levothyroxine (SYNTHROID) 75 MCG tablet Take 75 mcg by mouth once daily.      meclizine (ANTIVERT) 25 mg tablet TAKE 1 TABLET(25 MG) BY MOUTH THREE TIMES DAILY AS NEEDED FOR DIZZINESS 30 tablet 0    mupirocin (BACTROBAN) 2 % ointment SMARTSI Application Topical 2-3 Times Daily      olopatadine (PATADAY) 0.2 % Drop INSTILL 1 DROP INTO EACH EYE ONCE DAILY 2.5 mL 5    pantoprazole (PROTONIX) 40 MG tablet Take 1 " tablet by mouth once daily 90 tablet 0    potassium chloride SA (K-DUR,KLOR-CON) 20 MEQ tablet Take 20 mEq by mouth as needed.       pravastatin (PRAVACHOL) 20 MG tablet Take 20 mg by mouth once daily.      testosterone cypionate (DEPOTESTOTERONE CYPIONATE) 200 mg/mL injection Inject 200 mg into the muscle every 14 (fourteen) days. 05mL every 2 weeks      traMADoL (ULTRAM) 50 mg tablet TAKE 1 TO 2 TABLETS BY MOUTH TWICE DAILY AS DIRECTED      EPINEPHrine (EPIPEN) 0.3 mg/0.3 mL AtIn INJECT CONTENTS OF 1 PEN AS NEEDED FOR ALLERGIC REACTION (INJECT  AT  FIRST  SIGN  OF SEVERE  ALLERGIC  REACTION  OR  ANAPHYLAXIS) 2 each 0    lisdexamfetamine (VYVANSE) 60 MG capsule Take 1 capsule (60 mg total) by mouth every morning. 30 capsule 0     No current facility-administered medications for this visit.     Review of patient's allergies indicates:   Allergen Reactions    Atorvastatin Other (See Comments)     myalgia    Doxycycline      Makes my throat swell    Gabapentin Other (See Comments)     Weakness, fatigue    Lyrica [pregabalin]      Memory loss    Micardis [telmisartan] Other (See Comments)     cough    Promethazine Other (See Comments)     Makes my skin crawl      Tetracyclines Other (See Comments) and Swelling      Past Medical History:   Diagnosis Date    ADD (attention deficit disorder)     Anxiety     Asthma     intermittent    Cardiomyopathy     Idiopathic    CHF (congestive heart failure)     Hyperlipidemia     Hypertension     Low testosterone     Rheumatoid arthritis, unspecified     Sleep apnea      Past Surgical History:   Procedure Laterality Date    CARDIAC CATHETERIZATION      left arm surgery      FB removal       Review of Systems   Constitutional:  Negative for activity change, appetite change, chills, diaphoresis, fatigue, fever and unexpected weight change.   HENT:  Negative for hearing loss, rhinorrhea and trouble swallowing.    Eyes:  Negative for discharge and visual disturbance.   Respiratory:   "Negative for chest tightness, shortness of breath and wheezing.    Cardiovascular:  Negative for chest pain, palpitations and leg swelling.   Gastrointestinal:  Negative for blood in stool, constipation, diarrhea and vomiting.   Endocrine: Negative for polydipsia and polyuria.   Genitourinary:  Negative for difficulty urinating, hematuria and urgency.   Musculoskeletal:  Positive for arthralgias, back pain and joint swelling. Negative for neck pain.        Right knee pain   Neurological:  Negative for weakness and headaches.   Psychiatric/Behavioral:  Negative for confusion and dysphoric mood.       OBJECTIVE:      Vitals:    05/06/24 0841   BP: 124/82   BP Location: Left arm   Patient Position: Sitting   BP Method: Large (Manual)   Pulse: 101   SpO2: 98%   Weight: 103.4 kg (228 lb)   Height: 5' 10" (1.778 m)     Physical Exam  Vitals and nursing note reviewed.   Constitutional:       General: He is awake. He is not in acute distress.     Appearance: Normal appearance. He is overweight. He is not ill-appearing, toxic-appearing or diaphoretic.   HENT:      Head: Normocephalic and atraumatic.      Nose: Nose normal.   Eyes:      General: Lids are normal. Gaze aligned appropriately.      Conjunctiva/sclera: Conjunctivae normal.      Right eye: Right conjunctiva is not injected.      Left eye: Left conjunctiva is not injected.      Pupils: Pupils are equal, round, and reactive to light.   Cardiovascular:      Rate and Rhythm: Normal rate and regular rhythm.      Pulses: Normal pulses.      Heart sounds: Normal heart sounds, S1 normal and S2 normal. No murmur heard.     No friction rub. No gallop.   Pulmonary:      Effort: Pulmonary effort is normal. No respiratory distress.      Breath sounds: Normal breath sounds. No stridor. No decreased breath sounds, wheezing, rhonchi or rales.   Chest:      Chest wall: No tenderness.   Musculoskeletal:      Cervical back: Neck supple.      Right lower leg: No edema.      Left lower " leg: No edema.   Lymphadenopathy:      Cervical: No cervical adenopathy.   Skin:     General: Skin is warm and dry.      Capillary Refill: Capillary refill takes less than 2 seconds.      Findings: No erythema or rash.   Neurological:      Mental Status: He is alert and oriented to person, place, and time. Mental status is at baseline.   Psychiatric:         Attention and Perception: Attention normal.         Mood and Affect: Mood normal.         Speech: Speech normal.         Behavior: Behavior normal. Behavior is cooperative.         Thought Content: Thought content normal.         Judgment: Judgment normal.        Lab Visit on 04/30/2024   Component Date Value Ref Range Status    WBC 04/30/2024 9.54  3.90 - 12.70 K/uL Final    RBC 04/30/2024 5.05  4.60 - 6.20 M/uL Final    Hemoglobin 04/30/2024 14.8  14.0 - 18.0 g/dL Final    Hematocrit 04/30/2024 46.3  40.0 - 54.0 % Final    MCV 04/30/2024 92  82 - 98 fL Final    MCH 04/30/2024 29.3  27.0 - 31.0 pg Final    MCHC 04/30/2024 32.0  32.0 - 36.0 g/dL Final    RDW 04/30/2024 15.2 (H)  11.5 - 14.5 % Final    Platelets 04/30/2024 296  150 - 450 K/uL Final    MPV 04/30/2024 8.7 (L)  9.2 - 12.9 fL Final    Immature Granulocytes 04/30/2024 0.4  0.0 - 0.5 % Final    Gran # (ANC) 04/30/2024 6.4  1.8 - 7.7 K/uL Final    Immature Grans (Abs) 04/30/2024 0.04  0.00 - 0.04 K/uL Final    Comment: Mild elevation in immature granulocytes is non specific and   can be seen in a variety of conditions including stress response,   acute inflammation, trauma and pregnancy. Correlation with other   laboratory and clinical findings is essential.      Lymph # 04/30/2024 2.1  1.0 - 4.8 K/uL Final    Mono # 04/30/2024 0.9  0.3 - 1.0 K/uL Final    Eos # 04/30/2024 0.0  0.0 - 0.5 K/uL Final    Baso # 04/30/2024 0.03  0.00 - 0.20 K/uL Final    nRBC 04/30/2024 0  0 /100 WBC Final    Gran % 04/30/2024 67.0  38.0 - 73.0 % Final    Lymph % 04/30/2024 22.2  18.0 - 48.0 % Final    Mono % 04/30/2024 9.7   4.0 - 15.0 % Final    Eosinophil % 04/30/2024 0.4  0.0 - 8.0 % Final    Basophil % 04/30/2024 0.3  0.0 - 1.9 % Final    Differential Method 04/30/2024 Automated   Final    Sodium 04/30/2024 135 (L)  136 - 145 mmol/L Final    Potassium 04/30/2024 3.3 (L)  3.5 - 5.1 mmol/L Final    Chloride 04/30/2024 100  95 - 110 mmol/L Final    CO2 04/30/2024 29  23 - 29 mmol/L Final    Glucose 04/30/2024 126 (H)  70 - 110 mg/dL Final    BUN 04/30/2024 16  6 - 20 mg/dL Final    Creatinine 04/30/2024 1.1  0.5 - 1.4 mg/dL Final    Calcium 04/30/2024 8.6 (L)  8.7 - 10.5 mg/dL Final    Total Protein 04/30/2024 6.7  6.0 - 8.4 g/dL Final    Albumin 04/30/2024 3.9  3.5 - 5.2 g/dL Final    Total Bilirubin 04/30/2024 0.5  0.1 - 1.0 mg/dL Final    Comment: For infants and newborns, interpretation of results should be based  on gestational age, weight and in agreement with clinical  observations.    Premature Infant recommended reference ranges:  Up to 24 hours.............<8.0 mg/dL  Up to 48 hours............<12.0 mg/dL  3-5 days..................<15.0 mg/dL  6-29 days.................<15.0 mg/dL      Alkaline Phosphatase 04/30/2024 66  55 - 135 U/L Final    AST 04/30/2024 10  10 - 40 U/L Final    ALT 04/30/2024 12  10 - 44 U/L Final    eGFR 04/30/2024 >60.0  >60 mL/min/1.73 m^2 Final    Anion Gap 04/30/2024 6 (L)  8 - 16 mmol/L Final    Sed Rate 04/30/2024 13 (H)  0 - 10 mm/Hr Final    CRP 04/30/2024 0.10  <1.00 mg/dL Final    Comment: CRP-Normal Application expected values:   <1.0        mg/dL   Normal Range  1.0 - 5.0  mg/dL   Indicates mild inflammation  5.0 - 10.0 mg/dL   Indicates severe inflammation  >10.0        mg/dL   Represents serious processes and   frequently         indicates the presence of a bacterial   infection.        Assessment:       1. Attention deficit hyperactivity disorder (ADHD), unspecified ADHD type    2. Primary hypertension    3. Chronic pain of right knee    4. History of severe allergic reaction    5.  Rheumatoid arthritis, involving unspecified site, unspecified whether rheumatoid factor present    6. Stage 3a chronic kidney disease        Plan:       Attention deficit hyperactivity disorder (ADHD), unspecified ADHD type  Stable, continue current treatment.  reviewed.  Follow-up in 3 months for refills.   -     lisdexamfetamine (VYVANSE) 60 MG capsule; Take 1 capsule (60 mg total) by mouth every morning.  Dispense: 30 capsule; Refill: 0    Primary hypertension  Stable, continue irbesartan 150 mg bid and coreg 25 mg bid. Reduce the amount of salt in your diet; Lose weight; Avoid drinking too much alcohol; Exercise at least 30 minutes per day most days of the week.  Continue current medications and home BP monitoring.     Chronic pain of right knee  Referral placed at patient request for 2nd opinion.   -     Ambulatory referral/consult to Orthopedics; Future; Expected date: 05/13/2024    History of severe allergic reaction  -     EPINEPHrine (EPIPEN) 0.3 mg/0.3 mL AtIn; INJECT CONTENTS OF 1 PEN AS NEEDED FOR ALLERGIC REACTION (INJECT  AT  FIRST  SIGN  OF SEVERE  ALLERGIC  REACTION  OR  ANAPHYLAXIS)  Dispense: 2 each; Refill: 0    Rheumatoid arthritis, involving unspecified site, unspecified whether rheumatoid factor present  Inflammatory markers are stable. Managed by Rheumatology.     Stage 3a chronic kidney disease  Kidney function improved, now wnl. Stay hydrated. Limit NSAIDs.     This note was created using [employer] voice recognition software that occasionally misinterprets phrases or words.     I spent a total of 28 minutes on the day of the visit.This includes face to face time and non-face to face time preparing to see the patient (eg, review of tests), obtaining and/or reviewing separately obtained history, documenting clinical information in the electronic or other health record, independently interpreting results and communicating results to the patient/family/caregiver, or care coordinator.    Follow  up in about 3 months (around 8/6/2024) for ADHD.          5/6/2024 Luke Ramos, BETY, EMMAP

## 2024-05-07 ENCOUNTER — OFFICE VISIT (OUTPATIENT)
Dept: RHEUMATOLOGY | Facility: CLINIC | Age: 54
End: 2024-05-07
Payer: MEDICARE

## 2024-05-07 VITALS
BODY MASS INDEX: 32.82 KG/M2 | SYSTOLIC BLOOD PRESSURE: 118 MMHG | HEART RATE: 96 BPM | DIASTOLIC BLOOD PRESSURE: 52 MMHG | WEIGHT: 229.25 LBS | HEIGHT: 70 IN

## 2024-05-07 DIAGNOSIS — M06.9 RHEUMATOID ARTHRITIS, INVOLVING UNSPECIFIED SITE, UNSPECIFIED WHETHER RHEUMATOID FACTOR PRESENT: Primary | ICD-10-CM

## 2024-05-07 DIAGNOSIS — M79.7 FIBROMYALGIA: ICD-10-CM

## 2024-05-07 DIAGNOSIS — Z79.899 LONG-TERM USE OF PLAQUENIL: ICD-10-CM

## 2024-05-07 DIAGNOSIS — M19.90 OSTEOARTHRITIS, UNSPECIFIED OSTEOARTHRITIS TYPE, UNSPECIFIED SITE: ICD-10-CM

## 2024-05-07 DIAGNOSIS — Z51.81 MEDICATION MONITORING ENCOUNTER: ICD-10-CM

## 2024-05-07 PROCEDURE — 99213 OFFICE O/P EST LOW 20 MIN: CPT | Mod: PBBFAC,PN | Performed by: STUDENT IN AN ORGANIZED HEALTH CARE EDUCATION/TRAINING PROGRAM

## 2024-05-07 PROCEDURE — 99999 PR PBB SHADOW E&M-EST. PATIENT-LVL III: CPT | Mod: PBBFAC,,, | Performed by: STUDENT IN AN ORGANIZED HEALTH CARE EDUCATION/TRAINING PROGRAM

## 2024-05-07 PROCEDURE — 99214 OFFICE O/P EST MOD 30 MIN: CPT | Mod: S$PBB,,, | Performed by: STUDENT IN AN ORGANIZED HEALTH CARE EDUCATION/TRAINING PROGRAM

## 2024-05-07 ASSESSMENT — ROUTINE ASSESSMENT OF PATIENT INDEX DATA (RAPID3)
PAIN SCORE: 8.5
PATIENT GLOBAL ASSESSMENT SCORE: 5
TOTAL RAPID3 SCORE: 5.5
MDHAQ FUNCTION SCORE: 0.9
PSYCHOLOGICAL DISTRESS SCORE: 1.1
FATIGUE SCORE: 1.1

## 2024-05-29 ENCOUNTER — PATIENT MESSAGE (OUTPATIENT)
Dept: RHEUMATOLOGY | Facility: CLINIC | Age: 54
End: 2024-05-29
Payer: MEDICARE

## 2024-05-29 DIAGNOSIS — M06.9 RHEUMATOID ARTHRITIS, INVOLVING UNSPECIFIED SITE, UNSPECIFIED WHETHER RHEUMATOID FACTOR PRESENT: Primary | ICD-10-CM

## 2024-06-03 DIAGNOSIS — F90.9 ATTENTION DEFICIT HYPERACTIVITY DISORDER (ADHD), UNSPECIFIED ADHD TYPE: ICD-10-CM

## 2024-06-03 RX ORDER — LISDEXAMFETAMINE DIMESYLATE 60 MG/1
60 CAPSULE ORAL EVERY MORNING
Qty: 30 CAPSULE | Refills: 0 | OUTPATIENT
Start: 2024-06-03

## 2024-06-06 ENCOUNTER — PATIENT MESSAGE (OUTPATIENT)
Dept: FAMILY MEDICINE | Facility: CLINIC | Age: 54
End: 2024-06-06
Payer: MEDICARE

## 2024-06-06 DIAGNOSIS — F90.9 ATTENTION DEFICIT HYPERACTIVITY DISORDER (ADHD), UNSPECIFIED ADHD TYPE: ICD-10-CM

## 2024-06-07 RX ORDER — LISDEXAMFETAMINE DIMESYLATE 60 MG/1
60 CAPSULE ORAL EVERY MORNING
Qty: 30 CAPSULE | Refills: 0 | Status: SHIPPED | OUTPATIENT
Start: 2024-06-07

## 2024-06-14 RX ORDER — DIFLUNISAL 500 MG/1
500 TABLET, FILM COATED ORAL 2 TIMES DAILY PRN
Qty: 60 TABLET | Refills: 0 | Status: SHIPPED | OUTPATIENT
Start: 2024-06-14

## 2024-07-08 DIAGNOSIS — F90.9 ATTENTION DEFICIT HYPERACTIVITY DISORDER (ADHD), UNSPECIFIED ADHD TYPE: ICD-10-CM

## 2024-07-09 RX ORDER — LISDEXAMFETAMINE DIMESYLATE 60 MG/1
60 CAPSULE ORAL EVERY MORNING
Qty: 30 CAPSULE | Refills: 0 | Status: SHIPPED | OUTPATIENT
Start: 2024-07-09

## 2024-07-25 ENCOUNTER — PATIENT OUTREACH (OUTPATIENT)
Dept: ADMINISTRATIVE | Facility: HOSPITAL | Age: 54
End: 2024-07-25
Payer: MEDICARE

## 2024-07-25 NOTE — PROGRESS NOTES
Population Health Chart Review & Patient Outreach Details      Additional Page Hospital Health Notes:               Updates Requested / Reviewed:      Updated Care Coordination Note, Care Everywhere, Care Team Updated, and Immunizations Reconciliation Completed or Queried: [facility] Topics Overdue:      [facility] Score: 1     Hemoglobin A1c                       Health Maintenance Topic(s) Outreach Outcomes & Actions Taken:    Eye Exam - Outreach Outcomes & Actions Taken  : Non-Diabetic Eye External Records Uploaded, Care Team & History Updated if Applicable

## 2024-07-31 ENCOUNTER — TELEPHONE (OUTPATIENT)
Dept: RHEUMATOLOGY | Facility: CLINIC | Age: 54
End: 2024-07-31
Payer: MEDICARE

## 2024-07-31 DIAGNOSIS — I10 PRIMARY HYPERTENSION: ICD-10-CM

## 2024-07-31 RX ORDER — IRBESARTAN 150 MG/1
150 TABLET ORAL
Qty: 90 TABLET | Refills: 1 | Status: SHIPPED | OUTPATIENT
Start: 2024-07-31

## 2024-08-01 DIAGNOSIS — J30.2 SEASONAL ALLERGIES: ICD-10-CM

## 2024-08-01 RX ORDER — AZELASTINE HYDROCHLORIDE, FLUTICASONE PROPIONATE 137; 50 UG/1; UG/1
1 SPRAY, METERED NASAL 2 TIMES DAILY
Qty: 23 G | Refills: 0 | Status: SHIPPED | OUTPATIENT
Start: 2024-08-01

## 2024-08-07 DIAGNOSIS — F90.9 ATTENTION DEFICIT HYPERACTIVITY DISORDER (ADHD), UNSPECIFIED ADHD TYPE: ICD-10-CM

## 2024-08-08 DIAGNOSIS — J30.2 SEASONAL ALLERGIES: ICD-10-CM

## 2024-08-09 RX ORDER — AZELASTINE HYDROCHLORIDE, FLUTICASONE PROPIONATE 137; 50 UG/1; UG/1
1 SPRAY, METERED NASAL 2 TIMES DAILY
Qty: 23 G | Refills: 0 | OUTPATIENT
Start: 2024-08-09

## 2024-08-14 ENCOUNTER — OFFICE VISIT (OUTPATIENT)
Dept: FAMILY MEDICINE | Facility: CLINIC | Age: 54
End: 2024-08-14
Payer: MEDICARE

## 2024-08-14 VITALS
DIASTOLIC BLOOD PRESSURE: 80 MMHG | SYSTOLIC BLOOD PRESSURE: 134 MMHG | HEIGHT: 70 IN | BODY MASS INDEX: 33.79 KG/M2 | HEART RATE: 90 BPM | OXYGEN SATURATION: 98 % | WEIGHT: 236 LBS

## 2024-08-14 DIAGNOSIS — F90.9 ATTENTION DEFICIT HYPERACTIVITY DISORDER (ADHD), UNSPECIFIED ADHD TYPE: Primary | ICD-10-CM

## 2024-08-14 DIAGNOSIS — L98.9 SKIN ABNORMALITY: ICD-10-CM

## 2024-08-14 DIAGNOSIS — I10 PRIMARY HYPERTENSION: ICD-10-CM

## 2024-08-14 PROCEDURE — 99214 OFFICE O/P EST MOD 30 MIN: CPT | Mod: S$PBB,,, | Performed by: NURSE PRACTITIONER

## 2024-08-14 PROCEDURE — 99215 OFFICE O/P EST HI 40 MIN: CPT | Mod: PBBFAC,PN | Performed by: NURSE PRACTITIONER

## 2024-08-14 PROCEDURE — 99999 PR PBB SHADOW E&M-EST. PATIENT-LVL V: CPT | Mod: PBBFAC,,, | Performed by: NURSE PRACTITIONER

## 2024-08-14 RX ORDER — LISDEXAMFETAMINE DIMESYLATE 60 MG/1
60 CAPSULE ORAL EVERY MORNING
Qty: 30 CAPSULE | Refills: 0 | Status: SHIPPED | OUTPATIENT
Start: 2024-08-14

## 2024-08-14 NOTE — PROGRESS NOTES
SUBJECTIVE:      Patient ID: Bharath Parr is a 53 y.o. male.    Chief Complaint: Follow-up    53-year-old male presents to the clinic for ADHD follow-up. ADHD is managed with Vyvanse 60 mg. He needs the medication to stay focused. Doing well at the current dosage. He is sleeping well at night and mood is stable. BP is elevated today with first reading. He does have a hx of dilated cardiomyopathy, which is managed by Cardiology. Cardiology is aware he is on Vyvanse and has been on Vyvanse for over 20 years.Denies chest pain, palpations, and SOB.    Skin continues to peel off right thumb. Symptoms started last October. He reports getting extreme construction adhesive to the area and since then he's had dry skin to the thumb. The skin is very dry and often cracks. He's tried neosporin wraps to keep it moist, super glue, and various otc lotions without relief.     He plans to have a colonoscopy later this year. Reports having labs done by Rheumatology at [facility].           Family History   Problem Relation Name Age of Onset    Cancer Mother          cervical    Heart disease Mother      Hypertension Mother      Stroke Father      Cancer Maternal Grandmother          bladder    Macular degeneration Paternal Grandmother      Cancer Paternal Grandfather          skin    Alzheimer's disease Paternal Grandfather        Social History     Socioeconomic History    Marital status: Single   Tobacco Use    Smoking status: Never     Passive exposure: Never    Smokeless tobacco: Never   Substance and Sexual Activity    Alcohol use: Yes     Comment: socially    Drug use: No    Sexual activity: Not Currently     Social Determinants of Health     Physical Activity: Insufficiently Active (2/10/2022)    Exercise Vital Sign     Days of Exercise per Week: 3 days     Minutes of Exercise per Session: 20 min   Stress: Stress Concern Present (2/10/2022)    [employer] - Occupational Stress Questionnaire  "    Feeling of Stress : To some extent     Current Outpatient Medications   Medication Sig Dispense Refill    acetaminophen (TYLENOL) 500 MG tablet Take 500 mg by mouth every 6 (six) hours as needed for Pain.      acetaminophen-codeine 300-30mg (TYLENOL #3) 300-30 mg Tab Take 1 tablet by mouth 3 (three) times daily as needed.      azelastine-fluticasone (DYMISTA) 137-50 mcg/spray Spry nassal spray 1 spray by Each Nostril route 2 (two) times daily. 23 g 0    BD LUER-KRISTIE SYRINGE 3 mL 21 gauge x 1 1/2" Syrg SMARTSIG:Injection Every 2 Weeks      BD REGULAR BEVEL NEEDLES 18 gauge x 1 1/2" Ndle USE NEEDLES TO DRAW UP TESTOSTERONE EVERY 2 WEEKS      carvediloL (COREG) 25 MG tablet Take 25 mg by mouth 2 (two) times daily.      cetirizine (ZYRTEC) 10 MG tablet Take 10 mg by mouth 2 (two) times daily.      cyclobenzaprine (FLEXERIL) 10 MG tablet Take 10 mg by mouth.      diflunisaL (DOLOBID) 500 mg Tab Take 1 tablet (500 mg total) by mouth 2 (two) times daily as needed. 60 tablet 0    EPINEPHrine (EPIPEN) 0.3 mg/0.3 mL AtIn INJECT CONTENTS OF 1 PEN AS NEEDED FOR ALLERGIC REACTION (INJECT  AT  FIRST  SIGN  OF SEVERE  ALLERGIC  REACTION  OR  ANAPHYLAXIS) 2 each 0    ergocalciferol (ERGOCALCIFEROL) 50,000 unit Cap Take 50,000 Units by mouth every 7 days.      ezetimibe (ZETIA) 10 mg tablet Take 10 mg by mouth once daily.  5    famotidine (PEPCID) 20 MG tablet Take 20 mg by mouth 2 (two) times daily.      hydroxychloroquine (PLAQUENIL) 200 mg tablet Take 1 tablet (200 mg total) by mouth once daily. 90 tablet 1    irbesartan (AVAPRO) 150 MG tablet TAKE 1 TABLET(150 MG) BY MOUTH EVERY EVENING 90 tablet 1    levothyroxine (SYNTHROID) 75 MCG tablet Take 75 mcg by mouth once daily.      lisdexamfetamine (VYVANSE) 60 MG capsule Take 1 capsule (60 mg total) by mouth every morning. 30 capsule 0    mupirocin (BACTROBAN) 2 % ointment SMARTSI Application Topical 2-3 Times Daily      olopatadine (PATADAY) 0.2 % Drop INSTILL 1 DROP INTO " EACH EYE ONCE DAILY 2.5 mL 5    pantoprazole (PROTONIX) 40 MG tablet Take 1 tablet by mouth once daily 90 tablet 0    potassium chloride SA (K-DUR,KLOR-CON) 20 MEQ tablet Take 20 mEq by mouth as needed.       pravastatin (PRAVACHOL) 20 MG tablet Take 20 mg by mouth once daily.      testosterone cypionate (DEPOTESTOTERONE CYPIONATE) 200 mg/mL injection Inject 200 mg into the muscle every 14 (fourteen) days. 05mL every 2 weeks      traMADoL (ULTRAM) 50 mg tablet TAKE 1 TO 2 TABLETS BY MOUTH TWICE DAILY AS DIRECTED      buPROPion (WELLBUTRIN SR) 150 MG TBSR 12 hr tablet Take 1 tablet (150 mg total) by mouth 2 (two) times daily. (Patient not taking: Reported on 8/14/2024) 180 tablet 3    meclizine (ANTIVERT) 25 mg tablet TAKE 1 TABLET(25 MG) BY MOUTH THREE TIMES DAILY AS NEEDED FOR DIZZINESS (Patient not taking: Reported on 8/14/2024) 30 tablet 0     No current facility-administered medications for this visit.     Review of patient's allergies indicates:   Allergen Reactions    Atorvastatin Other (See Comments)     myalgia    Doxycycline      Makes my throat swell    Gabapentin Other (See Comments)     Weakness, fatigue    Lyrica [pregabalin]      Memory loss    Micardis [telmisartan] Other (See Comments)     cough    Promethazine Other (See Comments)     Makes my skin crawl      Tetracyclines Other (See Comments) and Swelling      Past Medical History:   Diagnosis Date    ADD (attention deficit disorder)     Anxiety     Asthma     intermittent    Cardiomyopathy     Idiopathic    CHF (congestive heart failure)     Hyperlipidemia     Hypertension     Low testosterone     Rheumatoid arthritis, unspecified     Sleep apnea      Past Surgical History:   Procedure Laterality Date    CARDIAC CATHETERIZATION      left arm surgery      FB removal       Review of Systems   Constitutional:  Negative for activity change, appetite change, chills, diaphoresis, fatigue, fever and unexpected weight change.   HENT:  Negative for  "congestion, ear pain, sinus pressure, sore throat, trouble swallowing and voice change.    Eyes:  Negative for pain, discharge and visual disturbance.   Respiratory:  Negative for cough, chest tightness, shortness of breath and wheezing.    Cardiovascular:  Negative for chest pain and palpitations.   Gastrointestinal:  Negative for abdominal pain, constipation, diarrhea, nausea and vomiting.   Genitourinary:  Negative for difficulty urinating, flank pain, frequency and urgency.   Musculoskeletal:  Negative for back pain and joint swelling.   Skin:  Positive for color change. Negative for rash.        Dry skin right thumb   Neurological:  Negative for dizziness, seizures, syncope, weakness, numbness and headaches.   Hematological:  Negative for adenopathy.   Psychiatric/Behavioral:  Negative for dysphoric mood and sleep disturbance. The patient is not nervous/anxious.       OBJECTIVE:      Vitals:    08/14/24 0848 08/14/24 0905   BP: (!) 152/98 134/80   BP Location: Left arm    Patient Position: Sitting    BP Method: Medium (Manual)    Pulse: 90    SpO2: 98%    Weight: 107 kg (236 lb)    Height: 5' 10" (1.778 m)      Physical Exam  Vitals and nursing note reviewed.   Constitutional:       General: He is awake. He is not in acute distress.     Appearance: He is well-developed and well-groomed. He is obese. He is not ill-appearing, toxic-appearing or diaphoretic.   HENT:      Head: Normocephalic and atraumatic.      Nose: Nose normal.   Eyes:      General: Lids are normal. Gaze aligned appropriately.      Conjunctiva/sclera: Conjunctivae normal.      Right eye: Right conjunctiva is not injected.      Left eye: Left conjunctiva is not injected.      Pupils: Pupils are equal, round, and reactive to light.   Cardiovascular:      Rate and Rhythm: Normal rate and regular rhythm.      Pulses: Normal pulses.      Heart sounds: Normal heart sounds, S1 normal and S2 normal. No murmur heard.     No friction rub. No gallop. "   Pulmonary:      Effort: Pulmonary effort is normal. No respiratory distress.      Breath sounds: Normal breath sounds. No stridor. No decreased breath sounds, wheezing, rhonchi or rales.   Chest:      Chest wall: No tenderness.   Musculoskeletal:      Cervical back: Neck supple.      Right lower leg: No edema.      Left lower leg: No edema.   Lymphadenopathy:      Cervical: No cervical adenopathy.   Skin:     General: Skin is warm and dry.      Capillary Refill: Capillary refill takes less than 2 seconds.      Findings: No erythema or rash.      Comments: Desquamation noted to right thumb. Skin is very dry. There is no erythema or signs of infection. See imaging   Neurological:      Mental Status: He is alert and oriented to person, place, and time. Mental status is at baseline.   Psychiatric:         Attention and Perception: Attention normal.         Mood and Affect: Mood normal.         Speech: Speech normal.         Behavior: Behavior normal. Behavior is cooperative.         Thought Content: Thought content normal.         Judgment: Judgment normal.        Assessment:       1. Attention deficit hyperactivity disorder (ADHD), unspecified ADHD type    2. Primary hypertension    3. Skin abnormality        Plan:       Attention deficit hyperactivity disorder (ADHD), unspecified ADHD type  Stable, continue Vyvanse 60 mg.  reviewed. Med refilled earlier this morning. Follow-up in 3 months.     Primary hypertension  Stable with repeat. Spikes in BP are likely secondary to his chronic pain. Continue Coreg 25 mg bid and Irbesartan 150 mg daily. Reduce the amount of salt in your diet; Lose weight; Avoid drinking too much alcohol; Exercise at least 30 minutes per day most days of the week.  Continue current medications and home BP monitoring.    Skin abnormality  Discussed trying emollients like Aquaphor or Eucerin. This is a chronic problem so will have patient evaluated by dermatology.  -     Ambulatory  referral/consult to Dermatology; Future; Expected date: 08/21/2024    This note was created using Twelve voice recognition software that occasionally misinterprets phrases or words.     I spent a total of 22 minutes on the day of the visit.This includes face to face time and non-face to face time preparing to see the patient (eg, review of tests), obtaining and/or reviewing separately obtained history, documenting clinical information in the electronic or other health record, independently interpreting results and communicating results to the patient/family/caregiver, or care coordinator.    Follow up in about 3 months (around 11/14/2024) for ADHD.          8/14/2024 BETY Biggs, EMMAP

## 2024-09-11 DIAGNOSIS — F90.9 ATTENTION DEFICIT HYPERACTIVITY DISORDER (ADHD), UNSPECIFIED ADHD TYPE: ICD-10-CM

## 2024-09-12 RX ORDER — LISDEXAMFETAMINE DIMESYLATE 60 MG/1
60 CAPSULE ORAL EVERY MORNING
Qty: 30 CAPSULE | Refills: 0 | Status: SHIPPED | OUTPATIENT
Start: 2024-09-12

## 2024-09-23 DIAGNOSIS — J30.2 SEASONAL ALLERGIES: ICD-10-CM

## 2024-09-23 RX ORDER — AZELASTINE HYDROCHLORIDE, FLUTICASONE PROPIONATE 137; 50 UG/1; UG/1
1 SPRAY, METERED NASAL 2 TIMES DAILY
Qty: 23 G | Refills: 0 | Status: SHIPPED | OUTPATIENT
Start: 2024-09-23

## 2024-09-24 DIAGNOSIS — M06.9 RHEUMATOID ARTHRITIS, INVOLVING UNSPECIFIED SITE, UNSPECIFIED WHETHER RHEUMATOID FACTOR PRESENT: ICD-10-CM

## 2024-09-25 RX ORDER — HYDROXYCHLOROQUINE SULFATE 200 MG/1
200 TABLET, FILM COATED ORAL DAILY
Qty: 90 TABLET | Refills: 3 | Status: SHIPPED | OUTPATIENT
Start: 2024-09-25 | End: 2025-09-25

## 2024-10-04 DIAGNOSIS — J30.2 SEASONAL ALLERGIES: ICD-10-CM

## 2024-10-04 RX ORDER — AZELASTINE HYDROCHLORIDE, FLUTICASONE PROPIONATE 137; 50 UG/1; UG/1
1 SPRAY, METERED NASAL 2 TIMES DAILY
Qty: 23 G | Refills: 0 | Status: SHIPPED | OUTPATIENT
Start: 2024-10-04

## 2024-10-10 DIAGNOSIS — F90.9 ATTENTION DEFICIT HYPERACTIVITY DISORDER (ADHD), UNSPECIFIED ADHD TYPE: ICD-10-CM

## 2024-10-10 RX ORDER — LISDEXAMFETAMINE DIMESYLATE 60 MG/1
60 CAPSULE ORAL EVERY MORNING
Qty: 30 CAPSULE | Refills: 0 | Status: SHIPPED | OUTPATIENT
Start: 2024-10-10

## 2024-10-11 DIAGNOSIS — M06.9 RHEUMATOID ARTHRITIS, INVOLVING UNSPECIFIED SITE, UNSPECIFIED WHETHER RHEUMATOID FACTOR PRESENT: ICD-10-CM

## 2024-10-11 RX ORDER — HYDROXYCHLOROQUINE SULFATE 200 MG/1
200 TABLET, FILM COATED ORAL DAILY
Qty: 90 TABLET | Refills: 3 | Status: SHIPPED | OUTPATIENT
Start: 2024-10-11 | End: 2025-10-11

## 2024-10-15 DIAGNOSIS — M06.9 RHEUMATOID ARTHRITIS, INVOLVING UNSPECIFIED SITE, UNSPECIFIED WHETHER RHEUMATOID FACTOR PRESENT: ICD-10-CM

## 2024-10-15 RX ORDER — HYDROXYCHLOROQUINE SULFATE 200 MG/1
200 TABLET, FILM COATED ORAL DAILY
Qty: 90 TABLET | Refills: 3 | Status: SHIPPED | OUTPATIENT
Start: 2024-10-15 | End: 2025-10-15

## 2024-10-25 DIAGNOSIS — M06.9 RHEUMATOID ARTHRITIS, INVOLVING UNSPECIFIED SITE, UNSPECIFIED WHETHER RHEUMATOID FACTOR PRESENT: ICD-10-CM

## 2024-10-25 DIAGNOSIS — J30.2 SEASONAL ALLERGIES: ICD-10-CM

## 2024-10-25 RX ORDER — HYDROXYCHLOROQUINE SULFATE 200 MG/1
200 TABLET, FILM COATED ORAL DAILY
Qty: 90 TABLET | Refills: 3 | Status: SHIPPED | OUTPATIENT
Start: 2024-10-25 | End: 2025-10-25

## 2024-10-28 RX ORDER — AZELASTINE HYDROCHLORIDE, FLUTICASONE PROPIONATE 137; 50 UG/1; UG/1
1 SPRAY, METERED NASAL 2 TIMES DAILY
Qty: 23 G | Refills: 0 | Status: SHIPPED | OUTPATIENT
Start: 2024-10-28

## 2024-11-11 ENCOUNTER — OFFICE VISIT (OUTPATIENT)
Dept: FAMILY MEDICINE | Facility: CLINIC | Age: 54
End: 2024-11-11
Payer: MEDICARE

## 2024-11-11 VITALS
HEART RATE: 116 BPM | HEIGHT: 70 IN | TEMPERATURE: 99 F | WEIGHT: 233.19 LBS | OXYGEN SATURATION: 99 % | BODY MASS INDEX: 33.38 KG/M2 | DIASTOLIC BLOOD PRESSURE: 86 MMHG | SYSTOLIC BLOOD PRESSURE: 122 MMHG

## 2024-11-11 DIAGNOSIS — J02.9 SORE THROAT: ICD-10-CM

## 2024-11-11 DIAGNOSIS — R73.9 HYPERGLYCEMIA: ICD-10-CM

## 2024-11-11 DIAGNOSIS — R05.1 ACUTE COUGH: ICD-10-CM

## 2024-11-11 DIAGNOSIS — M06.9 RHEUMATOID ARTHRITIS, INVOLVING UNSPECIFIED SITE, UNSPECIFIED WHETHER RHEUMATOID FACTOR PRESENT: ICD-10-CM

## 2024-11-11 DIAGNOSIS — F90.9 ATTENTION DEFICIT HYPERACTIVITY DISORDER (ADHD), UNSPECIFIED ADHD TYPE: ICD-10-CM

## 2024-11-11 DIAGNOSIS — J06.9 VIRAL URI WITH COUGH: Primary | ICD-10-CM

## 2024-11-11 DIAGNOSIS — I10 PRIMARY HYPERTENSION: ICD-10-CM

## 2024-11-11 DIAGNOSIS — E03.9 HYPOTHYROIDISM, UNSPECIFIED TYPE: ICD-10-CM

## 2024-11-11 LAB
CTP QC/QA: YES
MOLECULAR STREP A: NEGATIVE
POC MOLECULAR INFLUENZA A AGN: NEGATIVE
POC MOLECULAR INFLUENZA B AGN: NEGATIVE
SARS-COV-2 RDRP RESP QL NAA+PROBE: NEGATIVE

## 2024-11-11 PROCEDURE — 99999PBSHW POCT STREP A MOLECULAR: Mod: PBBFAC,,,

## 2024-11-11 PROCEDURE — 87651 STREP A DNA AMP PROBE: CPT | Mod: PBBFAC,PN | Performed by: NURSE PRACTITIONER

## 2024-11-11 PROCEDURE — 99999 PR PBB SHADOW E&M-EST. PATIENT-LVL III: CPT | Mod: PBBFAC,,, | Performed by: NURSE PRACTITIONER

## 2024-11-11 PROCEDURE — 99999PBSHW: Mod: PBBFAC,,,

## 2024-11-11 PROCEDURE — 87502 INFLUENZA DNA AMP PROBE: CPT | Mod: PBBFAC,PN | Performed by: NURSE PRACTITIONER

## 2024-11-11 PROCEDURE — 99213 OFFICE O/P EST LOW 20 MIN: CPT | Mod: PBBFAC,PN | Performed by: NURSE PRACTITIONER

## 2024-11-11 PROCEDURE — G2211 COMPLEX E/M VISIT ADD ON: HCPCS | Mod: 95,S$PBB,, | Performed by: NURSE PRACTITIONER

## 2024-11-11 PROCEDURE — 99214 OFFICE O/P EST MOD 30 MIN: CPT | Mod: S$PBB,,, | Performed by: NURSE PRACTITIONER

## 2024-11-11 PROCEDURE — 99999PBSHW POCT INFLUENZA A/B MOLECULAR: Mod: PBBFAC,,,

## 2024-11-11 PROCEDURE — 87635 SARS-COV-2 COVID-19 AMP PRB: CPT | Mod: PBBFAC,PN | Performed by: NURSE PRACTITIONER

## 2024-11-11 RX ORDER — METHYLPREDNISOLONE 4 MG/1
TABLET ORAL
Qty: 21 EACH | Refills: 0 | Status: SHIPPED | OUTPATIENT
Start: 2024-11-11 | End: 2024-12-02

## 2024-11-11 RX ORDER — CLOBETASOL PROPIONATE 0.5 MG/G
CREAM TOPICAL 2 TIMES DAILY
COMMUNITY
Start: 2024-09-23

## 2024-11-11 RX ORDER — BENZONATATE 200 MG/1
200 CAPSULE ORAL 3 TIMES DAILY PRN
Qty: 30 CAPSULE | Refills: 0 | Status: CANCELLED | OUTPATIENT
Start: 2024-11-11 | End: 2024-11-21

## 2024-11-11 RX ORDER — LISDEXAMFETAMINE DIMESYLATE 60 MG/1
60 CAPSULE ORAL EVERY MORNING
Qty: 30 CAPSULE | Refills: 0 | Status: SHIPPED | OUTPATIENT
Start: 2024-11-11

## 2024-11-11 NOTE — PATIENT INSTRUCTIONS
Patient Instructions   OVER THE COUNTER RECOMMENDATIONS/SUGGESTIONS (IF NO CONTRAINDICATIONS).      ·         Make sure to stay well hydrated.      ·         Use Nasal Saline to mechanically move any post nasal drip from your eustachian tube or from the back of your throat.      ·         Use warm saltwater gargles to ease your throat pain. Warm saltwater gargles as needed for sore throat-  1/2 tsp salt to 1 cup warm water, gargle as desired. Warm fluids tend to relieve a sore throat.      .         Throat lozenges, Chloraseptic spray or other over the counter treatments are ok to use as well. Use as directed.      ·         Use an antihistamine such as Claritin, Zyrtec or Allegra to dry you out.      ·         Use pseudoephedrine (behind the counter) to decongest. Pseudoephedrine  30 mg up to 240 mg /day. It can raise your blood pressure and give you palpitations.      ·         Use Mucinex (guaifenesin) to break up mucous up to 2400mg/day to loosen any mucous.      ·         The Mucinex DM pill has a cough suppressant that can be sedating. It can be used at night to stop the tickle at the back of your throat.      ·         You can use Mucinex D (it has guaifenesin and a high dose of pseudoephedrine) in the mornings to help decongest.      ·         Use Afrin (oxymetazoline) in each nare for no longer than 3 days, as it is addictive. It can also dry out your mucous membranes and cause elevated blood pressure. This is especially useful if you are flying.      ·         Use Flonase 1-2 sprays/nostril per day. It is a local acting steroid nasal spray, if you develop a bloody nose, stop using the medication immediately.      ·         Sometimes Nyquil at night is beneficial to help you get some rest, however it is sedating, and it does have an antihistamine, and Tylenol.      ·         Honey is a natural cough suppressant that can be used.      ·         Tylenol up to 4,000 mg a day is safe for short periods and can  be used for body aches, pain, and fever. However, in high doses and prolonged use it can cause liver irritation.      ·         Ibuprofen is a non-steroidal anti-inflammatory that can be used for body aches, pain, and fever. However, it can also cause stomach irritation if overused.

## 2024-11-11 NOTE — PROGRESS NOTES
SUBJECTIVE:      Patient ID: Bharath Parr is a 53 y.o. male.    Chief Complaint: Follow-up (3 monf f/u), Cough (Onset saturday), Sore Throat, and post nasl drip     History of Present Illness    CHIEF COMPLAINT:  Mr. Parr presents for an ADHD follow-up and reports feeling unwell with a cough and associated symptoms since Saturday.    HPI:  Mr. Parr developed a cough 3-4 days ago, initially with a sore throat that has since improved. He has postnasal drip, worse when recumbent, and produces clear sputum. He denies shortness of breath.    Mr. Parr is concerned about potential progression to chronic cough. He had two previous episodes of undiagnosed chronic cough. The first occurred in 2008, lasting 7 years, and resolved after sinus surgery performed by an ENT specialist. The second began in 2021, lasting approximately 1 year, and resolved unexpectedly after payton COVID-19.    Mr. Parr's father recently had similar symptoms after traveling to [place]. Mr. Prar did not travel.    Since his last visit, the patient underwent a colonoscopy. The recent colonoscopy revealed a small polyp measuring 0.3 mm, smaller than in previous years.    Mr. Parr's care team includes Dr. Fei Taylor (cardiologist), Dr. Multani (endocrinologist for low testosterone and thyroid issues), and a new rheumatologist. He takes Synthroid for thyroid management and has biannual endocrinology follow-ups.    Mr. Parr denies fever, shortness of breath, chest pain, and palpitations.    MEDICATIONS:  Mr. Parr is on Vyvanse 60 mg for ADHD, Pepcid AC for reflux, and Synthroid for thyroid issues.    MEDICAL HISTORY:  Mr. Parr has a history of chronic cough from 2008 to 2015 with an unknown cause, and another episode from 2021 to 2022. He also has a history of low testosterone, a thyroid condition, and reflux.    FAMILY HISTORY:  Family history is significant for father with a recent  respiratory illness.    SURGICAL HISTORY:  Mr. Parr underwent sinus surgery after 2015, which was indicated for chronic cough. He also had a recent colonoscopy where a small polyp (0.3 mm) was found.    TEST RESULTS:  Mr. Parr had a cholesterol test in February. His glucose was elevated in the last lab test, and his A1C was previously elevated. Mr. Parr tested negative for flu, COVID, and strep.        Review of Systems   Constitutional:  Negative for activity change, appetite change, chills, diaphoresis, fatigue, fever and unexpected weight change.   HENT:  Positive for congestion, postnasal drip and sore throat. Negative for ear pain, sinus pressure, trouble swallowing and voice change.    Eyes:  Negative for pain, discharge and visual disturbance.   Respiratory:  Positive for cough. Negative for chest tightness, shortness of breath and wheezing.    Cardiovascular:  Negative for chest pain and palpitations.   Gastrointestinal:  Negative for abdominal pain, constipation, diarrhea, nausea and vomiting.   Genitourinary:  Negative for difficulty urinating, flank pain, frequency and urgency.   Musculoskeletal:  Negative for back pain and joint swelling.   Skin:  Negative for color change and rash.   Neurological:  Negative for dizziness, seizures, syncope, weakness, numbness and headaches.   Hematological:  Negative for adenopathy.   Psychiatric/Behavioral:  Negative for dysphoric mood and sleep disturbance. The patient is not nervous/anxious.        Family History   Problem Relation Name Age of Onset    Cancer Mother          cervical    Heart disease Mother      Hypertension Mother      Stroke Father      Cancer Maternal Grandmother          bladder    Macular degeneration Paternal Grandmother      Cancer Paternal Grandfather          skin    Alzheimer's disease Paternal Grandfather        Social History     Socioeconomic History    Marital status: Single   Tobacco Use    Smoking status: Never      "Passive exposure: Never    Smokeless tobacco: Never   Substance and Sexual Activity    Alcohol use: Yes     Comment: socially    Drug use: No    Sexual activity: Not Currently     Social Drivers of Health     Physical Activity: Insufficiently Active (2/10/2022)    Exercise Vital Sign     Days of Exercise per Week: 3 days     Minutes of Exercise per Session: 20 min   Stress: Stress Concern Present (2/10/2022)    [facility] [employer] - Occupational Stress Questionnaire     Feeling of Stress : To some extent     Current Outpatient Medications   Medication Sig Dispense Refill    acetaminophen (TYLENOL) 500 MG tablet Take 500 mg by mouth every 6 (six) hours as needed for Pain.      azelastine-fluticasone (DYMISTA) 137-50 mcg/spray Spry nassal spray Use 1 spray(s) in each nostril twice daily 23 g 0    BD LUER-KRISTIE SYRINGE 3 mL 21 gauge x 1 1/2" Syrg SMARTSIG:Injection Every 2 Weeks      BD REGULAR BEVEL NEEDLES 18 gauge x 1 1/2" Ndle USE NEEDLES TO DRAW UP TESTOSTERONE EVERY 2 WEEKS      carvediloL (COREG) 25 MG tablet Take 25 mg by mouth 2 (two) times daily.      cetirizine (ZYRTEC) 10 MG tablet Take 10 mg by mouth 2 (two) times daily.      clobetasoL (TEMOVATE) 0.05 % cream Apply topically 2 (two) times daily.      cyclobenzaprine (FLEXERIL) 10 MG tablet Take 10 mg by mouth.      diflunisaL (DOLOBID) 500 mg Tab Take 1 tablet (500 mg total) by mouth 2 (two) times daily as needed. 60 tablet 0    EPINEPHrine (EPIPEN) 0.3 mg/0.3 mL AtIn INJECT CONTENTS OF 1 PEN AS NEEDED FOR ALLERGIC REACTION (INJECT  AT  FIRST  SIGN  OF SEVERE  ALLERGIC  REACTION  OR  ANAPHYLAXIS) 2 each 0    ergocalciferol (ERGOCALCIFEROL) 50,000 unit Cap Take 50,000 Units by mouth every 7 days.      ezetimibe (ZETIA) 10 mg tablet Take 10 mg by mouth once daily.  5    famotidine (PEPCID) 20 MG tablet Take 20 mg by mouth 2 (two) times daily.      hydroxychloroquine (PLAQUENIL) 200 mg tablet Take 1 tablet (200 mg total) by mouth once " daily. 90 tablet 3    irbesartan (AVAPRO) 150 MG tablet TAKE 1 TABLET(150 MG) BY MOUTH EVERY EVENING 90 tablet 1    levothyroxine (SYNTHROID) 75 MCG tablet Take 75 mcg by mouth once daily.      olopatadine (PATADAY) 0.2 % Drop INSTILL 1 DROP INTO EACH EYE ONCE DAILY 2.5 mL 5    potassium chloride SA (K-DUR,KLOR-CON) 20 MEQ tablet Take 20 mEq by mouth as needed.       pravastatin (PRAVACHOL) 20 MG tablet Take 20 mg by mouth once daily.      testosterone cypionate (DEPOTESTOTERONE CYPIONATE) 200 mg/mL injection Inject 200 mg into the muscle every 14 (fourteen) days. 05mL every 2 weeks      traMADoL (ULTRAM) 50 mg tablet TAKE 1 TO 2 TABLETS BY MOUTH TWICE DAILY AS DIRECTED      acetaminophen-codeine 300-30mg (TYLENOL #3) 300-30 mg Tab Take 1 tablet by mouth 3 (three) times daily as needed. (Patient not taking: Reported on 11/11/2024)      buPROPion (WELLBUTRIN SR) 150 MG TBSR 12 hr tablet Take 1 tablet (150 mg total) by mouth 2 (two) times daily. (Patient not taking: Reported on 11/11/2024) 180 tablet 3    lisdexamfetamine (VYVANSE) 60 MG capsule Take 1 capsule (60 mg total) by mouth every morning. 30 capsule 0    methylPREDNISolone (MEDROL DOSEPACK) 4 mg tablet use as directed 21 each 0     No current facility-administered medications for this visit.     Review of patient's allergies indicates:   Allergen Reactions    Atorvastatin Other (See Comments)     myalgia    Doxycycline      Makes my throat swell    Gabapentin Other (See Comments)     Weakness, fatigue    Lyrica [pregabalin]      Memory loss    Micardis [telmisartan] Other (See Comments)     cough    Promethazine Other (See Comments)     Makes my skin crawl      Propofol analogues Other (See Comments)     BRAIN FOG,  FEELING VERY WEIRD    Tetracyclines Other (See Comments) and Swelling      Past Medical History:   Diagnosis Date    ADD (attention deficit disorder)     Anxiety     Asthma     intermittent    Cardiomyopathy     Idiopathic    CHF (congestive heart  "failure)     Hyperlipidemia     Hypertension     Low testosterone     Rheumatoid arthritis, unspecified     Sleep apnea      Past Surgical History:   Procedure Laterality Date    CARDIAC CATHETERIZATION      left arm surgery      FB removal          OBJECTIVE:      Vitals:    11/11/24 0842   BP: (!) 122/90   BP Location: Left arm   Patient Position: Sitting   Pulse: (!) 116   Temp: 99.3 °F (37.4 °C)   TempSrc: Oral   SpO2: 99%   Weight: 105.8 kg (233 lb 3.2 oz)   Height: 5' 10" (1.778 m)     Physical Exam  Vitals and nursing note reviewed.   Constitutional:       General: He is awake. He is not in acute distress.     Appearance: He is well-developed and well-groomed. He is obese. He is not ill-appearing, toxic-appearing or diaphoretic.   HENT:      Head: Normocephalic and atraumatic.      Right Ear: Tympanic membrane, ear canal and external ear normal.      Left Ear: Tympanic membrane, ear canal and external ear normal.      Nose: Congestion present.      Right Sinus: No maxillary sinus tenderness or frontal sinus tenderness.      Left Sinus: No maxillary sinus tenderness or frontal sinus tenderness.      Mouth/Throat:      Lips: Pink.      Mouth: Mucous membranes are moist.      Pharynx: Oropharynx is clear. Uvula midline. No pharyngeal swelling or posterior oropharyngeal erythema.   Eyes:      General: Lids are normal. Gaze aligned appropriately.      Conjunctiva/sclera: Conjunctivae normal.      Right eye: Right conjunctiva is not injected.      Left eye: Left conjunctiva is not injected.      Pupils: Pupils are equal, round, and reactive to light.   Cardiovascular:      Rate and Rhythm: Normal rate and regular rhythm.      Pulses: Normal pulses.      Heart sounds: Normal heart sounds, S1 normal and S2 normal. No murmur heard.     No friction rub. No gallop.   Pulmonary:      Effort: Pulmonary effort is normal. No respiratory distress.      Breath sounds: Normal breath sounds. No stridor. No decreased breath " sounds, wheezing, rhonchi or rales.   Chest:      Chest wall: No tenderness.   Musculoskeletal:      Cervical back: Neck supple.      Right lower leg: No edema.      Left lower leg: No edema.   Lymphadenopathy:      Cervical: No cervical adenopathy.   Skin:     General: Skin is warm and dry.      Capillary Refill: Capillary refill takes less than 2 seconds.      Findings: No erythema or rash.   Neurological:      Mental Status: He is alert and oriented to person, place, and time. Mental status is at baseline.   Psychiatric:         Attention and Perception: Attention normal.         Mood and Affect: Mood normal.         Speech: Speech normal.         Behavior: Behavior normal. Behavior is cooperative.         Thought Content: Thought content normal.         Judgment: Judgment normal.       Office Visit on 11/11/2024   Component Date Value Ref Range Status    POC Rapid COVID 11/11/2024 Negative  Negative Final     Acceptable 11/11/2024 Yes   Final    POC Molecular Influenza A Ag 11/11/2024 Negative  Negative Final    POC Molecular Influenza B Ag 11/11/2024 Negative  Negative Final     Acceptable 11/11/2024 Yes   Final    Molecular Strep A, POC 11/11/2024 Negative  Negative Final     Acceptable 11/11/2024 Yes   Final          Assessment:       1. Viral URI with cough    2. Acute cough    3. Attention deficit hyperactivity disorder (ADHD), unspecified ADHD type    4. Sore throat    5. Primary hypertension    6. Rheumatoid arthritis, involving unspecified site, unspecified whether rheumatoid factor present    7. Hyperglycemia    8. Hypothyroidism, unspecified type        Plan:       Assessment & Plan    Diagnosed viral upper respiratory infection based on negative flu, COVID, and strep tests  Considered patient's history of chronic cough  Decided against prescribing Tessalon due to reported ineffectiveness in past use  Opted for Medrol Dosepak to address inflammation and  symptoms  Recommend Flonase and Zyrtec for postnasal drip management    VIRAL URI:  Symptoms are most likely due to viral infection.    Advised to take OTC medications such as tylenol and motrin for fever, Mucinex DM or similar for cough, antihistamine and/or decongestant for nasal symptoms.    Monitor blood pressure if a decongestant is used.    Get plenty of rest and fluids to maintain hydration.   Gargle with warm salt water if sore throat is present.    Start Medrol dose pack, Flonase, and Zyrtec.   List of otc meds to take provided at discharged.    POSTNASAL DRIP:  Explained that Flonase and Zyrtec can help manage postnasal drip.  Mr. Parr to use Flonase nasal spray.  Mr. Parr to take Zyrtec for postnasal drip.  Started Flonase nasal spray.  Started Zyrtec.    ATTENTION DEFICIT HYPERACTIVITY DISORDER (ADHD):  Refilled Vyvanse 60 mg.  Continue current treatment.  reviewed. Follow-up in 3 months.     LABS:  Routine lab work including cholesterol and A1C ordered.    FOLLOW UP:  Follow up in 3 months.        Viral URI with cough    Acute cough  -     POCT COVID-19 Rapid Screening  -     POCT Influenza A/B Molecular  -     methylPREDNISolone (MEDROL DOSEPACK) 4 mg tablet; use as directed  Dispense: 21 each; Refill: 0    Attention deficit hyperactivity disorder (ADHD), unspecified ADHD type  -     TSH; Future; Expected date: 11/11/2024  -     lisdexamfetamine (VYVANSE) 60 MG capsule; Take 1 capsule (60 mg total) by mouth every morning.  Dispense: 30 capsule; Refill: 0    Sore throat  -     POCT Strep A, Molecular    Primary hypertension  BP elevated today, likely secondary to illness. Patient to monitor BP at home and follow-up with Cardiology. Reduce the amount of salt in your diet; Lose weight; Avoid drinking too much alcohol; Exercise at least 30 minutes per day most days of the week.  Continue current medications and home BP monitoring.  -     CBC Auto Differential; Future; Expected date:  11/11/2024  -     Comprehensive Metabolic Panel; Future; Expected date: 11/11/2024  -     Lipid Panel; Future; Expected date: 11/11/2024  -     TSH; Future; Expected date: 11/11/2024  -     Microalbumin/Creatinine Ratio, Urine; Future; Expected date: 11/11/2024    Rheumatoid arthritis, involving unspecified site, unspecified whether rheumatoid factor present  Managed by Rheumatology.   -     CBC Auto Differential; Future; Expected date: 11/11/2024  -     Comprehensive Metabolic Panel; Future; Expected date: 11/11/2024    Hyperglycemia  Need to check A1c, last glucose elevated on CMP, unsure if labs were fasting. Limit carb and sugar intake. Keep follow-up with Endocrinology.   -     Comprehensive Metabolic Panel; Future; Expected date: 11/11/2024  -     Hemoglobin A1C; Future; Expected date: 11/11/2024    Hypothyroidism, unspecified type  Managed by Rheumatology.   -     Comprehensive Metabolic Panel; Future; Expected date: 11/11/2024  -     Lipid Panel; Future; Expected date: 11/11/2024  -     TSH; Future; Expected date: 11/11/2024  -     T4, FREE; Future; Expected date: 11/11/2024    I spent a total of 28 minutes on the day of the visit.This includes face to face time and non-face to face time preparing to see the patient (eg, review of tests), obtaining and/or reviewing separately obtained history, documenting clinical information in the electronic or other health record, independently interpreting results and communicating results to the patient/family/caregiver, or care coordinator.    Follow up in about 3 months (around 2/11/2025) for ADHD.          11/11/2024 BETY Biggs, PRAVIN    This note was generated with the assistance of ambient listening technology. Verbal consent was obtained by the patient and accompanying visitor(s) for the recording of patient appointment to facilitate this note. I attest to having reviewed and edited the generated note for accuracy, though some syntax or spelling  errors may persist. Please contact the author of this note for any clarification.

## 2024-12-02 ENCOUNTER — LAB VISIT (OUTPATIENT)
Dept: LAB | Facility: HOSPITAL | Age: 54
End: 2024-12-02
Attending: NURSE PRACTITIONER
Payer: MEDICARE

## 2024-12-02 DIAGNOSIS — M06.9 RHEUMATOID ARTHRITIS, INVOLVING UNSPECIFIED SITE, UNSPECIFIED WHETHER RHEUMATOID FACTOR PRESENT: ICD-10-CM

## 2024-12-02 DIAGNOSIS — I10 PRIMARY HYPERTENSION: ICD-10-CM

## 2024-12-02 DIAGNOSIS — R73.9 HYPERGLYCEMIA: ICD-10-CM

## 2024-12-02 DIAGNOSIS — E03.9 HYPOTHYROIDISM, UNSPECIFIED TYPE: ICD-10-CM

## 2024-12-02 DIAGNOSIS — F90.9 ATTENTION DEFICIT HYPERACTIVITY DISORDER (ADHD), UNSPECIFIED ADHD TYPE: ICD-10-CM

## 2024-12-02 LAB
ALBUMIN SERPL BCP-MCNC: 4.4 G/DL (ref 3.5–5.2)
ALP SERPL-CCNC: 82 U/L (ref 55–135)
ALT SERPL W/O P-5'-P-CCNC: 15 U/L (ref 10–44)
ANION GAP SERPL CALC-SCNC: 7 MMOL/L (ref 8–16)
AST SERPL-CCNC: 19 U/L (ref 10–40)
BASOPHILS # BLD AUTO: 0.07 K/UL (ref 0–0.2)
BASOPHILS NFR BLD: 0.8 % (ref 0–1.9)
BILIRUB SERPL-MCNC: 0.4 MG/DL (ref 0.1–1)
BUN SERPL-MCNC: 15 MG/DL (ref 6–20)
CALCIUM SERPL-MCNC: 9.4 MG/DL (ref 8.7–10.5)
CHLORIDE SERPL-SCNC: 102 MMOL/L (ref 95–110)
CHOLEST SERPL-MCNC: 195 MG/DL (ref 120–199)
CHOLEST/HDLC SERPL: 4.4 {RATIO} (ref 2–5)
CO2 SERPL-SCNC: 29 MMOL/L (ref 23–29)
CREAT SERPL-MCNC: 1.5 MG/DL (ref 0.5–1.4)
DIFFERENTIAL METHOD BLD: ABNORMAL
EOSINOPHIL # BLD AUTO: 0.2 K/UL (ref 0–0.5)
EOSINOPHIL NFR BLD: 2.7 % (ref 0–8)
ERYTHROCYTE [DISTWIDTH] IN BLOOD BY AUTOMATED COUNT: 13.2 % (ref 11.5–14.5)
EST. GFR  (NO RACE VARIABLE): 55.7 ML/MIN/1.73 M^2
ESTIMATED AVG GLUCOSE: 105 MG/DL (ref 68–131)
GLUCOSE SERPL-MCNC: 66 MG/DL (ref 70–110)
HBA1C MFR BLD: 5.3 % (ref 4.5–6.2)
HCT VFR BLD AUTO: 50.4 % (ref 40–54)
HDLC SERPL-MCNC: 44 MG/DL (ref 40–75)
HDLC SERPL: 22.6 % (ref 20–50)
HGB BLD-MCNC: 16.4 G/DL (ref 14–18)
IMM GRANULOCYTES # BLD AUTO: 0.02 K/UL (ref 0–0.04)
IMM GRANULOCYTES NFR BLD AUTO: 0.2 % (ref 0–0.5)
LDLC SERPL CALC-MCNC: 108 MG/DL (ref 63–159)
LYMPHOCYTES # BLD AUTO: 1.4 K/UL (ref 1–4.8)
LYMPHOCYTES NFR BLD: 16.5 % (ref 18–48)
MCH RBC QN AUTO: 30.1 PG (ref 27–31)
MCHC RBC AUTO-ENTMCNC: 32.5 G/DL (ref 32–36)
MCV RBC AUTO: 93 FL (ref 82–98)
MONOCYTES # BLD AUTO: 0.8 K/UL (ref 0.3–1)
MONOCYTES NFR BLD: 9.7 % (ref 4–15)
NEUTROPHILS # BLD AUTO: 5.8 K/UL (ref 1.8–7.7)
NEUTROPHILS NFR BLD: 70.1 % (ref 38–73)
NONHDLC SERPL-MCNC: 151 MG/DL
NRBC BLD-RTO: 0 /100 WBC
PLATELET # BLD AUTO: 296 K/UL (ref 150–450)
PMV BLD AUTO: 8.8 FL (ref 9.2–12.9)
POTASSIUM SERPL-SCNC: 4.3 MMOL/L (ref 3.5–5.1)
PROT SERPL-MCNC: 7.6 G/DL (ref 6–8.4)
RBC # BLD AUTO: 5.44 M/UL (ref 4.6–6.2)
SODIUM SERPL-SCNC: 138 MMOL/L (ref 136–145)
T4 FREE SERPL-MCNC: 0.59 NG/DL (ref 0.71–1.51)
TRIGL SERPL-MCNC: 215 MG/DL (ref 30–150)
TSH SERPL DL<=0.005 MIU/L-ACNC: 1.27 UIU/ML (ref 0.34–5.6)
WBC # BLD AUTO: 8.24 K/UL (ref 3.9–12.7)

## 2024-12-02 PROCEDURE — 84439 ASSAY OF FREE THYROXINE: CPT | Performed by: NURSE PRACTITIONER

## 2024-12-02 PROCEDURE — 80061 LIPID PANEL: CPT | Performed by: NURSE PRACTITIONER

## 2024-12-02 PROCEDURE — 85025 COMPLETE CBC W/AUTO DIFF WBC: CPT | Performed by: NURSE PRACTITIONER

## 2024-12-02 PROCEDURE — 80053 COMPREHEN METABOLIC PANEL: CPT | Performed by: NURSE PRACTITIONER

## 2024-12-02 PROCEDURE — 83036 HEMOGLOBIN GLYCOSYLATED A1C: CPT | Performed by: NURSE PRACTITIONER

## 2024-12-02 PROCEDURE — 84443 ASSAY THYROID STIM HORMONE: CPT | Performed by: NURSE PRACTITIONER

## 2024-12-10 DIAGNOSIS — F90.9 ATTENTION DEFICIT HYPERACTIVITY DISORDER (ADHD), UNSPECIFIED ADHD TYPE: ICD-10-CM

## 2024-12-10 RX ORDER — LISDEXAMFETAMINE DIMESYLATE 60 MG/1
60 CAPSULE ORAL EVERY MORNING
Qty: 30 CAPSULE | Refills: 0 | Status: SHIPPED | OUTPATIENT
Start: 2024-12-10

## 2025-01-09 DIAGNOSIS — F90.9 ATTENTION DEFICIT HYPERACTIVITY DISORDER (ADHD), UNSPECIFIED ADHD TYPE: ICD-10-CM

## 2025-01-10 RX ORDER — LISDEXAMFETAMINE DIMESYLATE 60 MG/1
60 CAPSULE ORAL EVERY MORNING
Qty: 30 CAPSULE | Refills: 0 | Status: SHIPPED | OUTPATIENT
Start: 2025-01-10

## 2025-01-12 DIAGNOSIS — J30.2 SEASONAL ALLERGIES: ICD-10-CM

## 2025-01-13 RX ORDER — AZELASTINE HYDROCHLORIDE, FLUTICASONE PROPIONATE 137; 50 UG/1; UG/1
1 SPRAY, METERED NASAL 2 TIMES DAILY
Qty: 23 G | Refills: 2 | Status: SHIPPED | OUTPATIENT
Start: 2025-01-13

## 2025-01-14 DIAGNOSIS — Z00.00 ENCOUNTER FOR MEDICARE ANNUAL WELLNESS EXAM: ICD-10-CM

## 2025-02-07 ENCOUNTER — OFFICE VISIT (OUTPATIENT)
Dept: FAMILY MEDICINE | Facility: CLINIC | Age: 55
End: 2025-02-07
Payer: MEDICARE

## 2025-02-07 VITALS
HEART RATE: 86 BPM | HEIGHT: 70 IN | BODY MASS INDEX: 33.38 KG/M2 | TEMPERATURE: 99 F | SYSTOLIC BLOOD PRESSURE: 130 MMHG | WEIGHT: 233.13 LBS | OXYGEN SATURATION: 98 % | DIASTOLIC BLOOD PRESSURE: 82 MMHG

## 2025-02-07 DIAGNOSIS — F90.9 ATTENTION DEFICIT HYPERACTIVITY DISORDER (ADHD), UNSPECIFIED ADHD TYPE: ICD-10-CM

## 2025-02-07 PROCEDURE — 99213 OFFICE O/P EST LOW 20 MIN: CPT | Mod: S$PBB,,, | Performed by: NURSE PRACTITIONER

## 2025-02-07 PROCEDURE — 99213 OFFICE O/P EST LOW 20 MIN: CPT | Mod: PBBFAC,PN | Performed by: NURSE PRACTITIONER

## 2025-02-07 PROCEDURE — G2211 COMPLEX E/M VISIT ADD ON: HCPCS | Mod: 95,S$PBB,, | Performed by: NURSE PRACTITIONER

## 2025-02-07 PROCEDURE — 99999 PR PBB SHADOW E&M-EST. PATIENT-LVL III: CPT | Mod: PBBFAC,,, | Performed by: NURSE PRACTITIONER

## 2025-02-07 RX ORDER — OXYCODONE AND ACETAMINOPHEN 7.5; 325 MG/1; MG/1
1 TABLET ORAL EVERY 6 HOURS PRN
COMMUNITY

## 2025-02-07 RX ORDER — LISDEXAMFETAMINE DIMESYLATE 60 MG/1
60 CAPSULE ORAL EVERY MORNING
Qty: 30 CAPSULE | Refills: 0 | Status: SHIPPED | OUTPATIENT
Start: 2025-02-07

## 2025-02-07 RX ORDER — IRBESARTAN 75 MG/1
1 TABLET ORAL DAILY
COMMUNITY
Start: 2024-11-18

## 2025-02-07 NOTE — PROGRESS NOTES
SUBJECTIVE:      Patient ID: Bharath Parr is a 54 y.o. male.    Chief Complaint: Follow-up (3 mon f/u)    History of Present Illness    CHIEF COMPLAINT:  Mr. Parr presents for follow-up regarding their ADHD medication management.    HPI:  Mr. Parr reports diminishing efficacy of the ablation procedure performed on his back at the beginning of January. He is currently taking Vyvanse 60 mg daily for ADHD management, which has improved his focus and concentration. Mr. Parr notes increased appetite recently but does not attribute this to Vyvanse, citing long-term use of the medication. Mr. Parr denies chest pain, shortness of breath, sleep disturbances, or appetite changes specifically related to Vyvanse use.    MEDICATIONS:  Mr. Parr is on Vyvanse 60 mg daily for ADHD, which is effective for improving his focus and concentration.    MEDICAL HISTORY:  Mr. Parr has a history of ADHD.    SURGICAL HISTORY:  He underwent an ablation procedure at the beginning of January 2025 to address his back pain.    IMAGING:  Mr. Parr had imaging for a back ablation at the beginning of January 2025.        Review of Systems   Constitutional:  Negative for activity change, appetite change, chills, diaphoresis, fatigue, fever and unexpected weight change.   HENT:  Negative for congestion, ear pain, sinus pressure, sore throat, trouble swallowing and voice change.    Eyes:  Negative for pain, discharge and visual disturbance.   Respiratory:  Negative for cough, chest tightness, shortness of breath and wheezing.    Cardiovascular:  Negative for chest pain and palpitations.   Gastrointestinal:  Negative for abdominal pain, constipation, diarrhea, nausea and vomiting.   Genitourinary:  Negative for difficulty urinating, flank pain, frequency and urgency.   Musculoskeletal:  Positive for back pain. Negative for joint swelling and neck pain.   Skin:  Negative for color change and rash.  "  Neurological:  Negative for dizziness, seizures, syncope, weakness, numbness and headaches.   Hematological:  Negative for adenopathy.   Psychiatric/Behavioral:  Negative for dysphoric mood and sleep disturbance. The patient is not nervous/anxious.        Family History   Problem Relation Name Age of Onset    Cancer Mother          cervical    Heart disease Mother      Hypertension Mother      Stroke Father      Cancer Maternal Grandmother          bladder    Macular degeneration Paternal Grandmother      Cancer Paternal Grandfather          skin    Alzheimer's disease Paternal Grandfather        Social History     Socioeconomic History    Marital status: Single   Tobacco Use    Smoking status: Never     Passive exposure: Never    Smokeless tobacco: Never   Substance and Sexual Activity    Alcohol use: Yes     Comment: socially    Drug use: No    Sexual activity: Not Currently     Social Drivers of Health     Physical Activity: Insufficiently Active (2/10/2022)    Exercise Vital Sign     Days of Exercise per Week: 3 days     Minutes of Exercise per Session: 20 min   Stress: Stress Concern Present (2/10/2022)    [facility] - Occupational Stress Questionnaire     Feeling of Stress : To some extent     Current Outpatient Medications   Medication Sig Dispense Refill    acetaminophen (TYLENOL) 500 MG tablet Take 600 mg by mouth every 6 (six) hours as needed for Pain.      azelastine-fluticasone (DYMISTA) 137-50 mcg/spray Spry nassal spray Use 1 spray(s) in each nostril twice daily 23 g 2    BD LUER-KRISTIE SYRINGE 3 mL 21 gauge x 1 1/2" Syrg SMARTSIG:Injection Every 2 Weeks      BD REGULAR BEVEL NEEDLES 18 gauge x 1 1/2" Ndle USE NEEDLES TO DRAW UP TESTOSTERONE EVERY 2 WEEKS      carvediloL (COREG) 25 MG tablet Take 25 mg by mouth 2 (two) times daily.      cetirizine (ZYRTEC) 10 MG tablet Take 10 mg by mouth 2 (two) times daily.      clobetasoL (TEMOVATE) 0.05 % cream Apply topically 2 (two) " times daily.      cyclobenzaprine (FLEXERIL) 10 MG tablet Take 10 mg by mouth.      diflunisaL (DOLOBID) 500 mg Tab Take 1 tablet (500 mg total) by mouth 2 (two) times daily as needed. 60 tablet 0    EPINEPHrine (EPIPEN) 0.3 mg/0.3 mL AtIn INJECT CONTENTS OF 1 PEN AS NEEDED FOR ALLERGIC REACTION (INJECT  AT  FIRST  SIGN  OF SEVERE  ALLERGIC  REACTION  OR  ANAPHYLAXIS) 2 each 0    ergocalciferol (ERGOCALCIFEROL) 50,000 unit Cap Take 50,000 Units by mouth every 7 days.      ezetimibe (ZETIA) 10 mg tablet Take 10 mg by mouth once daily.  5    famotidine (PEPCID) 20 MG tablet Take 20 mg by mouth 2 (two) times daily.      hydroxychloroquine (PLAQUENIL) 200 mg tablet Take 1 tablet (200 mg total) by mouth once daily. 90 tablet 3    irbesartan (AVAPRO) 75 MG tablet Take 1 tablet by mouth once daily.      levothyroxine (SYNTHROID) 75 MCG tablet Take 75 mcg by mouth once daily.      olopatadine (PATADAY) 0.2 % Drop INSTILL 1 DROP INTO EACH EYE ONCE DAILY 2.5 mL 5    oxyCODONE-acetaminophen (PERCOCET) 7.5-325 mg per tablet Take 1 tablet by mouth every 6 (six) hours as needed for Pain.      potassium chloride SA (K-DUR,KLOR-CON) 20 MEQ tablet Take 20 mEq by mouth as needed.       pravastatin (PRAVACHOL) 20 MG tablet Take 20 mg by mouth once daily.      testosterone cypionate (DEPOTESTOTERONE CYPIONATE) 200 mg/mL injection Inject 200 mg into the muscle every 14 (fourteen) days. 05mL every 2 weeks      traMADoL (ULTRAM) 50 mg tablet TAKE 1 TO 2 TABLETS BY MOUTH TWICE DAILY AS DIRECTED      lisdexamfetamine (VYVANSE) 60 MG capsule Take 1 capsule (60 mg total) by mouth every morning. 30 capsule 0     No current facility-administered medications for this visit.     Review of patient's allergies indicates:   Allergen Reactions    Atorvastatin Other (See Comments)     myalgia    Doxycycline      Makes my throat swell    Gabapentin Other (See Comments)     Weakness, fatigue    Lyrica [pregabalin]      Memory loss    Micardis  "[telmisartan] Other (See Comments)     cough    Promethazine Other (See Comments)     Makes my skin crawl      Propofol analogues Other (See Comments)     BRAIN FOG,  FEELING VERY WEIRD    Tetracyclines Other (See Comments) and Swelling      Past Medical History:   Diagnosis Date    ADD (attention deficit disorder)     Anxiety     Asthma     intermittent    Cardiomyopathy     Idiopathic    CHF (congestive heart failure)     Hyperlipidemia     Hypertension     Low testosterone     Rheumatoid arthritis, unspecified     Sleep apnea      Past Surgical History:   Procedure Laterality Date    CARDIAC CATHETERIZATION      left arm surgery      FB removal          OBJECTIVE:      Vitals:    02/07/25 0830 02/07/25 0854   BP: (!) 128/90 130/82   BP Location: Left arm    Patient Position: Sitting    Pulse: 86    Temp: 98.9 °F (37.2 °C)    TempSrc: Oral    SpO2: 98%    Weight: 105.7 kg (233 lb 1.6 oz)    Height: 5' 10" (1.778 m)      Physical Exam  Vitals and nursing note reviewed.   Constitutional:       General: He is awake. He is not in acute distress.     Appearance: He is well-developed and well-groomed. He is obese. He is not ill-appearing, toxic-appearing or diaphoretic.   HENT:      Head: Normocephalic and atraumatic.      Nose: Nose normal.   Eyes:      General: Lids are normal. Gaze aligned appropriately.      Conjunctiva/sclera: Conjunctivae normal.      Right eye: Right conjunctiva is not injected.      Left eye: Left conjunctiva is not injected.      Pupils: Pupils are equal, round, and reactive to light.   Cardiovascular:      Rate and Rhythm: Normal rate and regular rhythm.      Pulses: Normal pulses.      Heart sounds: Normal heart sounds, S1 normal and S2 normal. No murmur heard.     No friction rub. No gallop.   Pulmonary:      Effort: Pulmonary effort is normal. No respiratory distress.      Breath sounds: Normal breath sounds. No stridor. No decreased breath sounds, wheezing, rhonchi or rales.   Chest:      " Chest wall: No tenderness.   Musculoskeletal:      Cervical back: Neck supple.      Right lower leg: No edema.      Left lower leg: No edema.   Lymphadenopathy:      Cervical: No cervical adenopathy.   Skin:     General: Skin is warm and dry.      Capillary Refill: Capillary refill takes less than 2 seconds.      Findings: No erythema or rash.   Neurological:      Mental Status: He is alert and oriented to person, place, and time. Mental status is at baseline.   Psychiatric:         Attention and Perception: Attention normal.         Mood and Affect: Mood normal.         Speech: Speech normal.         Behavior: Behavior normal. Behavior is cooperative.         Thought Content: Thought content normal.         Judgment: Judgment normal.       Lab Visit on 12/02/2024   Component Date Value Ref Range Status    Microalbumin, Urine 12/02/2024 18.6  <19.9 ug/mL Final    Creatinine, Urine 12/02/2024 406.7 (H)  23.0 - 375.0 mg/dL Final    Comment: The random urine reference ranges provided were established   for 24 hour urine collections.  No reference ranges exist for  random urine specimens.  Correlate clinically.      Microalb/Creat Ratio 12/02/2024 4.6  0.0 - 30.0 ug/mg Final   Lab Visit on 12/02/2024   Component Date Value Ref Range Status    WBC 12/02/2024 8.24  3.90 - 12.70 K/uL Final    RBC 12/02/2024 5.44  4.60 - 6.20 M/uL Final    Hemoglobin 12/02/2024 16.4  14.0 - 18.0 g/dL Final    Hematocrit 12/02/2024 50.4  40.0 - 54.0 % Final    MCV 12/02/2024 93  82 - 98 fL Final    MCH 12/02/2024 30.1  27.0 - 31.0 pg Final    MCHC 12/02/2024 32.5  32.0 - 36.0 g/dL Final    RDW 12/02/2024 13.2  11.5 - 14.5 % Final    Platelets 12/02/2024 296  150 - 450 K/uL Final    MPV 12/02/2024 8.8 (L)  9.2 - 12.9 fL Final    Immature Granulocytes 12/02/2024 0.2  0.0 - 0.5 % Final    Gran # (ANC) 12/02/2024 5.8  1.8 - 7.7 K/uL Final    Immature Grans (Abs) 12/02/2024 0.02  0.00 - 0.04 K/uL Final    Comment: Mild elevation in immature  granulocytes is non specific and   can be seen in a variety of conditions including stress response,   acute inflammation, trauma and pregnancy. Correlation with other   laboratory and clinical findings is essential.      Lymph # 12/02/2024 1.4  1.0 - 4.8 K/uL Final    Mono # 12/02/2024 0.8  0.3 - 1.0 K/uL Final    Eos # 12/02/2024 0.2  0.0 - 0.5 K/uL Final    Baso # 12/02/2024 0.07  0.00 - 0.20 K/uL Final    nRBC 12/02/2024 0  0 /100 WBC Final    Gran % 12/02/2024 70.1  38.0 - 73.0 % Final    Lymph % 12/02/2024 16.5 (L)  18.0 - 48.0 % Final    Mono % 12/02/2024 9.7  4.0 - 15.0 % Final    Eosinophil % 12/02/2024 2.7  0.0 - 8.0 % Final    Basophil % 12/02/2024 0.8  0.0 - 1.9 % Final    Differential Method 12/02/2024 Automated   Final    Sodium 12/02/2024 138  136 - 145 mmol/L Final    Potassium 12/02/2024 4.3  3.5 - 5.1 mmol/L Final    Chloride 12/02/2024 102  95 - 110 mmol/L Final    CO2 12/02/2024 29  23 - 29 mmol/L Final    Glucose 12/02/2024 66 (L)  70 - 110 mg/dL Final    BUN 12/02/2024 15  6 - 20 mg/dL Final    Creatinine 12/02/2024 1.5 (H)  0.5 - 1.4 mg/dL Final    Calcium 12/02/2024 9.4  8.7 - 10.5 mg/dL Final    Total Protein 12/02/2024 7.6  6.0 - 8.4 g/dL Final    Albumin 12/02/2024 4.4  3.5 - 5.2 g/dL Final    Total Bilirubin 12/02/2024 0.4  0.1 - 1.0 mg/dL Final    Comment: For infants and newborns, interpretation of results should be based  on gestational age, weight and in agreement with clinical  observations.    Premature Infant recommended reference ranges:  Up to 24 hours.............<8.0 mg/dL  Up to 48 hours............<12.0 mg/dL  3-5 days..................<15.0 mg/dL  6-29 days.................<15.0 mg/dL      Alkaline Phosphatase 12/02/2024 82  55 - 135 U/L Final    AST 12/02/2024 19  10 - 40 U/L Final    ALT 12/02/2024 15  10 - 44 U/L Final    eGFR 12/02/2024 55.7 (A)  >60 mL/min/1.73 m^2 Final    Anion Gap 12/02/2024 7 (L)  8 - 16 mmol/L Final    Cholesterol 12/02/2024 195  120 - 199 mg/dL  Final    Comment: The National Cholesterol Education Program (NCEP) has set the  following guidelines (reference ranges) for Cholesterol:  Optimal.....................<200 mg/dL  Borderline High.............200-239 mg/dL  High........................> or = 240 mg/dL      Triglycerides 12/02/2024 215 (H)  30 - 150 mg/dL Final    Comment: The National Cholesterol Education Program (NCEP) has set the  following guidelines (reference values) for triglycerides:  Normal......................<150 mg/dL  Borderline High.............150-199 mg/dL  High........................200-499 mg/dL      HDL 12/02/2024 44  40 - 75 mg/dL Final    Comment: The National Cholesterol Education Program (NCEP) has set the  following guidelines (reference values) for HDL Cholesterol:  Low...............<40 mg/dL  Optimal...........>60 mg/dL      LDL Cholesterol 12/02/2024 108.0  63.0 - 159.0 mg/dL Final    Comment: The National Cholesterol Education Program (NCEP) has set the  following guidelines (reference values) for LDL Cholesterol:  Optimal.......................<130 mg/dL  Borderline High...............130-159 mg/dL  High..........................160-189 mg/dL  Very High.....................>190 mg/dL      HDL/Cholesterol Ratio 12/02/2024 22.6  20.0 - 50.0 % Final    Total Cholesterol/HDL Ratio 12/02/2024 4.4  2.0 - 5.0 Final    Non-HDL Cholesterol 12/02/2024 151  mg/dL Final    Comment: Risk category and Non-HDL cholesterol goals:  Coronary heart disease (CHD)or equivalent (10-year risk of CHD >20%):  Non-HDL cholesterol goal     <130 mg/dL  Two or more CHD risk factors and 10-year risk of CHD <= 20%:  Non-HDL cholesterol goal     <160 mg/dL  0 to 1 CHD risk factor:  Non-HDL cholesterol goal     <190 mg/dL      TSH 12/02/2024 1.266  0.340 - 5.600 uIU/mL Final    Hemoglobin A1C 12/02/2024 5.3  4.5 - 6.2 % Final    Comment: According to ADA guidelines, hemoglobin A1C <7.0% represents  optimal control in non-pregnant diabetic patients.   Different  metrics may apply to specific populations.   Standards of Medical Care in Diabetes - 2016.    For the purpose of screening for the presence of diabetes:  <5.7%     Consistent with the absence of diabetes  5.7-6.4%  Consistent with increasing risk for diabetes   (prediabetes)  >or=6.5%  Consistent with diabetes    Currently no consensus exists for use of hemoglobin A1C  for diagnosis of diabetes for children.      Estimated Avg Glucose 12/02/2024 105  68 - 131 mg/dL Final    Free T4 12/02/2024 0.59 (L)  0.71 - 1.51 ng/dL Final   Office Visit on 11/11/2024   Component Date Value Ref Range Status    POC Rapid COVID 11/11/2024 Negative  Negative Final     Acceptable 11/11/2024 Yes   Final    POC Molecular Influenza A Ag 11/11/2024 Negative  Negative Final    POC Molecular Influenza B Ag 11/11/2024 Negative  Negative Final     Acceptable 11/11/2024 Yes   Final    Molecular Strep A, POC 11/11/2024 Negative  Negative Final     Acceptable 11/11/2024 Yes   Final          Assessment:       1. Attention deficit hyperactivity disorder (ADHD), unspecified ADHD type        Plan:       Assessment & Plan    IMPRESSION:  - Assessed ADHD management with Vyvanse 60mg  - Evaluated effectiveness of back ablation performed in January  - Monitored for potential side effects of Vyvanse, including chest pain, shortness of breath, and sleep disturbances  - Noted slight elevation in blood pressure, rechecked during visit    HYPERTENSION:   - Blood pressure rechecked during visit: initially 128/90, then 130/82.  - Mr. Parr denies chest pain or shortness of breath.  - Continue current medications.  - Reduce the amount of salt in your diet; Lose weight; Avoid drinking too much alcohol; Exercise at least 30 minutes per day most days of the week.      ADHD:  - Continued Vyvanse 60mg daily, refilled prescription for 3 months.  - Mr. Parr reports improved focus and concentration.  -  No side effects reported (chest pain, shortness of breath, sleep disturbances, or appetite changes).  - Med refilled,  reviewed.    FOLLOW UP:  - Follow up in 3 months.        Attention deficit hyperactivity disorder (ADHD), unspecified ADHD type  -     lisdexamfetamine (VYVANSE) 60 MG capsule; Take 1 capsule (60 mg total) by mouth every morning.  Dispense: 30 capsule; Refill: 0    I spent a total of 10 minutes on the day of the visit.This includes face to face time and non-face to face time preparing to see the patient (eg, review of tests), obtaining and/or reviewing separately obtained history, documenting clinical information in the electronic or other health record, independently interpreting results and communicating results to the patient/family/caregiver, or care coordinator.    Follow up in about 3 months (around 5/7/2025) for [facility].          2/7/2025 BETY Biggs, PRAVIN    This note was generated with the assistance of ambient listening technology. Verbal consent was obtained by the patient and accompanying visitor(s) for the recording of patient appointment to facilitate this note. I attest to having reviewed and edited the generated note for accuracy, though some syntax or spelling errors may persist. Please contact the author of this note for any clarification.

## 2025-02-20 ENCOUNTER — PATIENT MESSAGE (OUTPATIENT)
Dept: FAMILY MEDICINE | Facility: CLINIC | Age: 55
End: 2025-02-20
Payer: MEDICARE

## 2025-03-08 DIAGNOSIS — F90.9 ATTENTION DEFICIT HYPERACTIVITY DISORDER (ADHD), UNSPECIFIED ADHD TYPE: ICD-10-CM

## 2025-03-10 RX ORDER — LISDEXAMFETAMINE DIMESYLATE 60 MG/1
60 CAPSULE ORAL EVERY MORNING
Qty: 30 CAPSULE | Refills: 0 | Status: SHIPPED | OUTPATIENT
Start: 2025-03-10 | End: 2025-03-12 | Stop reason: SDUPTHER

## 2025-03-12 ENCOUNTER — PATIENT MESSAGE (OUTPATIENT)
Dept: FAMILY MEDICINE | Facility: CLINIC | Age: 55
End: 2025-03-12
Payer: MEDICARE

## 2025-03-12 DIAGNOSIS — F90.9 ATTENTION DEFICIT HYPERACTIVITY DISORDER (ADHD), UNSPECIFIED ADHD TYPE: ICD-10-CM

## 2025-03-12 RX ORDER — LISDEXAMFETAMINE DIMESYLATE 60 MG/1
60 CAPSULE ORAL EVERY MORNING
Qty: 30 CAPSULE | Refills: 0 | Status: SHIPPED | OUTPATIENT
Start: 2025-03-12

## 2025-03-12 NOTE — TELEPHONE ENCOUNTER
Spoke to pt and informed him that if his insurance do not cover brand name they are not going to cover just because his pharmacy is out of stock. Informed him that he could switch pharmacies to see if another have it in stock. Pt would like Vyvanse to go to [facility].

## 2025-03-13 NOTE — TELEPHONE ENCOUNTER
Could you please send in the brand of Vyvanse due to surrounding pharmacies being out of generic so I can complete a PA?

## 2025-03-20 ENCOUNTER — OFFICE VISIT (OUTPATIENT)
Dept: PULMONOLOGY | Facility: CLINIC | Age: 55
End: 2025-03-20
Payer: MEDICARE

## 2025-03-20 VITALS
HEART RATE: 93 BPM | BODY MASS INDEX: 33.84 KG/M2 | WEIGHT: 236.38 LBS | SYSTOLIC BLOOD PRESSURE: 120 MMHG | OXYGEN SATURATION: 98 % | DIASTOLIC BLOOD PRESSURE: 80 MMHG | HEIGHT: 70 IN

## 2025-03-20 DIAGNOSIS — G47.33 OSA (OBSTRUCTIVE SLEEP APNEA): Primary | ICD-10-CM

## 2025-03-20 PROCEDURE — 99214 OFFICE O/P EST MOD 30 MIN: CPT | Mod: PBBFAC,PN | Performed by: NURSE PRACTITIONER

## 2025-03-20 PROCEDURE — 99999 PR PBB SHADOW E&M-EST. PATIENT-LVL IV: CPT | Mod: PBBFAC,,, | Performed by: NURSE PRACTITIONER

## 2025-03-20 PROCEDURE — 99203 OFFICE O/P NEW LOW 30 MIN: CPT | Mod: S$PBB,,, | Performed by: NURSE PRACTITIONER

## 2025-03-20 NOTE — PROGRESS NOTES
SUBJECTIVE:    Patient ID: Bharath Parr is a 54 y.o. male.    Chief Complaint: Sleep Apnea and Establish Care    HPI    Patient here today to establish care for SONYA. He was a patient of Dr. Byrne.  He is faithfully sleeping on his CPAP every night and feels great benefit from it.  His compliance report shows that he is 100% compliant sleeps an average of 12 hours and 11 minutes with an AHI of 2.7.  Past Medical History:   Diagnosis Date    ADD (attention deficit disorder)     Anxiety     Asthma     intermittent    Cardiomyopathy     Idiopathic    CHF (congestive heart failure)     Hyperlipidemia     Hypertension     Low testosterone     Rheumatoid arthritis, unspecified     Sleep apnea      Past Surgical History:   Procedure Laterality Date    CARDIAC CATHETERIZATION      left arm surgery      FB removal     Family History   Problem Relation Name Age of Onset    Cancer Mother          cervical    Heart disease Mother      Hypertension Mother      Stroke Father      Cancer Maternal Grandmother          bladder    Macular degeneration Paternal Grandmother      Cancer Paternal Grandfather          skin    Alzheimer's disease Paternal Grandfather          Social History:   Marital Status: Single  Occupation: Data Unavailable  Alcohol History:  reports current alcohol use.  Tobacco History:  reports that he has never smoked. He has never been exposed to tobacco smoke. He has never used smokeless tobacco.  Drug History:  reports no history of drug use.    Review of patient's allergies indicates:   Allergen Reactions    Atorvastatin Other (See Comments)     myalgia    Doxycycline      Makes my throat swell    Gabapentin Other (See Comments)     Weakness, fatigue    Lyrica [pregabalin]      Memory loss    Micardis [telmisartan] Other (See Comments)     cough    Promethazine Other (See Comments)     Makes my skin crawl      Propofol analogues Other (See Comments)     BRAIN FOG,  FEELING VERY WEIRD     "Tetracyclines Other (See Comments) and Swelling       Current Medications[1]        Review of Systems  General: Feeling Well.  Eyes: Vision is good.  ENT:  No sinusitis or pharyngitis.   Heart:: No chest pain or palpitations.  Lungs: No cough, sputum, or wheezing.  GI: No Nausea, vomiting, constipation, diarrhea, or reflux.  : No dysuria, hesitancy, or nocturia.  Musculoskeletal: No joint pain or myalgias.  Skin: No lesions or rashes.  Neuro: No headaches or neuropathy.  Lymph: No edema or adenopathy.  Psych: No anxiety or depression.  Endo: No weight change.    OBJECTIVE:      /80 (BP Location: Left arm, Patient Position: Sitting)   Pulse 93   Ht 5' 10" (1.778 m)   Wt 107.2 kg (236 lb 6.4 oz)   SpO2 98%   BMI 33.92 kg/m²     Physical Exam  GENERAL: Older patient in no distress.  HEENT: Pupils equal and reactive. Extraocular movements intact. Nose intact.      Pharynx moist.  NECK: Supple.   HEART: Regular rate and rhythm. No murmur or gallop auscultated.  LUNGS: Clear to auscultation and percussion. Lung excursion symmetrical. No change in fremitus. No adventitial noises.  ABDOMEN: Bowel sounds present. Non-tender, no masses palpated.  EXTREMITIES: Normal muscle tone and joint movement, no cyanosis or clubbing.   LYMPHATICS: No adenopathy palpated, no edema.  SKIN: Dry, intact, no lesions.   NEURO: Cranial nerves II-XII intact. Motor strength 5/5 bilaterally, upper and lower extremities.  PSYCH: Appropriate affect.    Assessment:       1. SONYA (obstructive sleep apnea)          Plan:       SONYA (obstructive sleep apnea)  -     CPAP/BIPAP SUPPLIES     Will order nasal mask with chin strap  Keep sleeping on your cpap      Follow up in about 1 year (around 3/20/2026).               [1]   Current Outpatient Medications   Medication Sig Dispense Refill    acetaminophen (TYLENOL) 500 MG tablet Take 600 mg by mouth every 6 (six) hours as needed for Pain.      azelastine-fluticasone (DYMISTA) 137-50 mcg/spray " "Spry nassal spray Use 1 spray(s) in each nostril twice daily 23 g 2    BD LUER-KRISTIE SYRINGE 3 mL 21 gauge x 1 1/2" Syrg SMARTSIG:Injection Every 2 Weeks      BD REGULAR BEVEL NEEDLES 18 gauge x 1 1/2" Ndle USE NEEDLES TO DRAW UP TESTOSTERONE EVERY 2 WEEKS      carvediloL (COREG) 25 MG tablet Take 25 mg by mouth 2 (two) times daily.      cetirizine (ZYRTEC) 10 MG tablet Take 10 mg by mouth 2 (two) times daily.      clobetasoL (TEMOVATE) 0.05 % cream Apply topically 2 (two) times daily.      cyclobenzaprine (FLEXERIL) 10 MG tablet Take 10 mg by mouth.      diflunisaL (DOLOBID) 500 mg Tab Take 1 tablet (500 mg total) by mouth 2 (two) times daily as needed. 60 tablet 0    EPINEPHrine (EPIPEN) 0.3 mg/0.3 mL AtIn INJECT CONTENTS OF 1 PEN AS NEEDED FOR ALLERGIC REACTION (INJECT  AT  FIRST  SIGN  OF SEVERE  ALLERGIC  REACTION  OR  ANAPHYLAXIS) 2 each 0    ergocalciferol (ERGOCALCIFEROL) 50,000 unit Cap Take 50,000 Units by mouth every 7 days.      ezetimibe (ZETIA) 10 mg tablet Take 10 mg by mouth once daily.  5    famotidine (PEPCID) 20 MG tablet Take 20 mg by mouth 2 (two) times daily.      hydroxychloroquine (PLAQUENIL) 200 mg tablet Take 1 tablet (200 mg total) by mouth once daily. 90 tablet 3    irbesartan (AVAPRO) 75 MG tablet Take 1 tablet by mouth once daily.      levothyroxine (SYNTHROID) 75 MCG tablet Take 75 mcg by mouth once daily.      lisdexamfetamine (VYVANSE) 60 MG capsule Take 1 capsule (60 mg total) by mouth every morning. 30 capsule 0    olopatadine (PATADAY) 0.2 % Drop INSTILL 1 DROP INTO EACH EYE ONCE DAILY 2.5 mL 5    oxyCODONE-acetaminophen (PERCOCET) 7.5-325 mg per tablet Take 1 tablet by mouth every 6 (six) hours as needed for Pain.      potassium chloride SA (K-DUR,KLOR-CON) 20 MEQ tablet Take 20 mEq by mouth as needed.       pravastatin (PRAVACHOL) 20 MG tablet Take 20 mg by mouth once daily.      testosterone cypionate (DEPOTESTOTERONE CYPIONATE) 200 mg/mL injection Inject 200 mg into the muscle " every 14 (fourteen) days. 05mL every 2 weeks      traMADoL (ULTRAM) 50 mg tablet TAKE 1 TO 2 TABLETS BY MOUTH TWICE DAILY AS DIRECTED       No current facility-administered medications for this visit.

## 2025-04-17 DIAGNOSIS — F90.9 ATTENTION DEFICIT HYPERACTIVITY DISORDER (ADHD), UNSPECIFIED ADHD TYPE: ICD-10-CM

## 2025-04-17 RX ORDER — LISDEXAMFETAMINE DIMESYLATE 60 MG/1
60 CAPSULE ORAL EVERY MORNING
Qty: 30 CAPSULE | Refills: 0 | Status: SHIPPED | OUTPATIENT
Start: 2025-04-17

## 2025-05-04 DIAGNOSIS — J30.2 SEASONAL ALLERGIES: ICD-10-CM

## 2025-05-05 RX ORDER — AZELASTINE HYDROCHLORIDE, FLUTICASONE PROPIONATE 137; 50 UG/1; UG/1
SPRAY, METERED NASAL
Qty: 23 G | Refills: 3 | Status: SHIPPED | OUTPATIENT
Start: 2025-05-05

## 2025-05-07 ENCOUNTER — OFFICE VISIT (OUTPATIENT)
Dept: FAMILY MEDICINE | Facility: CLINIC | Age: 55
End: 2025-05-07
Payer: MEDICARE

## 2025-05-07 VITALS
OXYGEN SATURATION: 96 % | TEMPERATURE: 98 F | HEIGHT: 70 IN | WEIGHT: 239 LBS | BODY MASS INDEX: 34.22 KG/M2 | DIASTOLIC BLOOD PRESSURE: 88 MMHG | SYSTOLIC BLOOD PRESSURE: 126 MMHG | HEART RATE: 81 BPM

## 2025-05-07 DIAGNOSIS — Z79.899 ENCOUNTER FOR LONG-TERM (CURRENT) USE OF MEDICATIONS: ICD-10-CM

## 2025-05-07 DIAGNOSIS — I50.22 CHRONIC SYSTOLIC CONGESTIVE HEART FAILURE: ICD-10-CM

## 2025-05-07 DIAGNOSIS — I10 PRIMARY HYPERTENSION: ICD-10-CM

## 2025-05-07 DIAGNOSIS — Z12.5 SCREENING FOR PROSTATE CANCER: ICD-10-CM

## 2025-05-07 DIAGNOSIS — I42.0 DILATED CARDIOMYOPATHY: ICD-10-CM

## 2025-05-07 DIAGNOSIS — G47.33 OSA (OBSTRUCTIVE SLEEP APNEA): ICD-10-CM

## 2025-05-07 DIAGNOSIS — F90.9 ATTENTION DEFICIT HYPERACTIVITY DISORDER (ADHD), UNSPECIFIED ADHD TYPE: Primary | ICD-10-CM

## 2025-05-07 DIAGNOSIS — N18.31 STAGE 3A CHRONIC KIDNEY DISEASE: ICD-10-CM

## 2025-05-07 DIAGNOSIS — E03.9 HYPOTHYROIDISM, UNSPECIFIED TYPE: ICD-10-CM

## 2025-05-07 DIAGNOSIS — M06.9 RHEUMATOID ARTHRITIS, INVOLVING UNSPECIFIED SITE, UNSPECIFIED WHETHER RHEUMATOID FACTOR PRESENT: ICD-10-CM

## 2025-05-07 PROCEDURE — 99999 PR PBB SHADOW E&M-EST. PATIENT-LVL III: CPT | Mod: PBBFAC,,, | Performed by: NURSE PRACTITIONER

## 2025-05-07 PROCEDURE — 99214 OFFICE O/P EST MOD 30 MIN: CPT | Mod: S$PBB,,, | Performed by: NURSE PRACTITIONER

## 2025-05-07 PROCEDURE — 99213 OFFICE O/P EST LOW 20 MIN: CPT | Mod: PBBFAC,PN | Performed by: NURSE PRACTITIONER

## 2025-05-07 PROCEDURE — G2211 COMPLEX E/M VISIT ADD ON: HCPCS | Mod: 95,S$PBB,, | Performed by: NURSE PRACTITIONER

## 2025-05-07 RX ORDER — OXYCODONE AND ACETAMINOPHEN 5; 325 MG/1; MG/1
1 TABLET ORAL EVERY 8 HOURS PRN
COMMUNITY
Start: 2025-04-25

## 2025-05-07 RX ORDER — LISDEXAMFETAMINE DIMESYLATE 60 MG/1
60 CAPSULE ORAL EVERY MORNING
Qty: 30 CAPSULE | Refills: 0 | Status: SHIPPED | OUTPATIENT
Start: 2025-05-19

## 2025-05-07 RX ORDER — RIMEGEPANT SULFATE 75 MG/75MG
75 TABLET, ORALLY DISINTEGRATING ORAL DAILY PRN
COMMUNITY
Start: 2025-04-24

## 2025-05-07 RX ORDER — METHYLPREDNISOLONE 4 MG/1
4 TABLET ORAL
COMMUNITY
Start: 2025-04-25

## 2025-05-07 RX ORDER — AMITRIPTYLINE HYDROCHLORIDE 25 MG/1
25 TABLET, FILM COATED ORAL
COMMUNITY
Start: 2025-04-24

## 2025-05-07 NOTE — PROGRESS NOTES
SUBJECTIVE:      Patient ID: Bharath Parr is a 54 y.o. male.    Chief Complaint: Follow-up (3 mon f/u)    History of Present Illness    CHIEF COMPLAINT:  Mr. Parr presents for ADHD follow-up. He has right shoulder pain and plan to have an upcoming shoulder surgery.    HPI:  Mr. Parr reports constant pain in his shoulder due to a torn rotator cuff. The pain initially occurred only during workouts but has become constant and persisted for 4 months without improvement. Mr. Parr is scheduled to see his orthopedist, Dr. Ware, today to discuss concerns about the upcoming shoulder surgery. Mr. Parr expresses concern about the durability of the material to be used in the surgery, fearing potential tearing if used for 2 months. He also mentions that while the cartilage in the shoulder is not causing inflammation, it might create pressure that could lead to tearing.    Mr. Parr recently received an injection from a pain doctor for his shoulder. During this procedure, patient reports severe pain when the doctor was 1/4 of the way through administering the injection, but the doctor continued. The pain doctor also administered synthetic cartilage, described as similar to the patient's own, to alleviate constant pain.    Mr. Parr mentions ongoing issues with his right knee, stating he needs a full knee replacement after having had a partial replacement previously. He expresses frustration about potentially needing repeated surgeries.    He is evaluated by a rheumatologist, Dr. De La Garza for rheumatoid arthritis and is currently taking Plaquenil for this condition. Mr. Parr also has sleep apnea, which is being managed by a pulmonologist, and he reports using a sleep apnea device nightly.    Mr. Parr mentions an upcoming cardiology visit with Dr. Taylor next month, noting that authorization for the surgery has already been obtained from the cardiologist.      Keshav is on Vyvanse 60 mg for ADHD, which is effective for focus and concentration. Mr. Parr reports difficulty sleeping due to pain, despite taking amitriptyline. He reports his sleep is not affected by the Vyvanse. Cardiology is aware of his Vyvanse use.    Mr. Parr denies chest pain or palpitations.       Review of Systems   Constitutional:  Negative for activity change, appetite change, chills, diaphoresis, fatigue, fever and unexpected weight change.   HENT:  Negative for congestion, ear pain, sinus pressure, sore throat, trouble swallowing and voice change.    Eyes:  Negative for pain, discharge and visual disturbance.   Respiratory:  Negative for cough, chest tightness, shortness of breath and wheezing.         SONYA   Cardiovascular:  Negative for chest pain and palpitations.        CHF, dilated cardiomyopathy, HTN and HLD   Gastrointestinal:  Negative for abdominal pain, constipation, diarrhea, nausea and vomiting.   Endocrine:        Hypothyroidism    Genitourinary:  Negative for difficulty urinating, flank pain, frequency and urgency.        Low testosterone    Musculoskeletal:  Positive for arthralgias (right shoulder and right knee). Negative for back pain and joint swelling.        Chronic pain   Skin:  Negative for color change and rash.   Allergic/Immunologic:        Rheumatoid arthritis   Neurological:  Negative for dizziness, seizures, syncope, weakness, numbness and headaches.   Hematological:  Negative for adenopathy.   Psychiatric/Behavioral:  Negative for dysphoric mood and sleep disturbance. The patient is not nervous/anxious.         ADHD on Vyvanse       Family History   Problem Relation Name Age of Onset    Cancer Mother          cervical    Heart disease Mother      Hypertension Mother      Stroke Father      Cancer Maternal Grandmother          bladder    Macular degeneration Paternal Grandmother      Cancer Paternal Grandfather          skin    Alzheimer's disease Paternal  "Grandfather        Social History     Socioeconomic History    Marital status: Single   Tobacco Use    Smoking status: Never     Passive exposure: Never    Smokeless tobacco: Never   Substance and Sexual Activity    Alcohol use: Yes     Comment: socially    Drug use: No    Sexual activity: Not Currently     Social Drivers of Health     Physical Activity: Insufficiently Active (2/10/2022)    Exercise Vital Sign     Days of Exercise per Week: 3 days     Minutes of Exercise per Session: 20 min   Stress: Stress Concern Present (2/10/2022)    [employer] [place] [employer] - Occupational Stress Questionnaire     Feeling of Stress : To some extent     Current Outpatient Medications   Medication Sig    acetaminophen (TYLENOL) 500 MG tablet Take 600 mg by mouth every 6 (six) hours as needed for Pain.    amitriptyline (ELAVIL) 25 MG tablet Take 25 mg by mouth.    azelastine-fluticasone (DYMISTA) 137-50 mcg/spray Spry nassal spray SPRAY 1 SPRAY(S) IN EACH NOSTRIL TWICE DAILY    BD LUER-KRISTIE SYRINGE 3 mL 21 gauge x 1 1/2" Syrg SMARTSIG:Injection Every 2 Weeks    BD REGULAR BEVEL NEEDLES 18 gauge x 1 1/2" Ndle USE NEEDLES TO DRAW UP TESTOSTERONE EVERY 2 WEEKS    carvediloL (COREG) 25 MG tablet Take 25 mg by mouth 2 (two) times daily.    cetirizine (ZYRTEC) 10 MG tablet Take 10 mg by mouth 2 (two) times daily.    clobetasoL (TEMOVATE) 0.05 % cream Apply topically 2 (two) times daily.    cyclobenzaprine (FLEXERIL) 10 MG tablet Take 10 mg by mouth.    diflunisaL (DOLOBID) 500 mg Tab Take 1 tablet (500 mg total) by mouth 2 (two) times daily as needed.    EPINEPHrine (EPIPEN) 0.3 mg/0.3 mL AtIn INJECT CONTENTS OF 1 PEN AS NEEDED FOR ALLERGIC REACTION (INJECT  AT  FIRST  SIGN  OF SEVERE  ALLERGIC  REACTION  OR  ANAPHYLAXIS)    ergocalciferol (ERGOCALCIFEROL) 50,000 unit Cap Take 50,000 Units by mouth every 7 days.    ezetimibe (ZETIA) 10 mg tablet Take 10 mg by mouth once daily.    famotidine (PEPCID) 20 MG tablet Take 20 mg " by mouth 2 (two) times daily.    hydroxychloroquine (PLAQUENIL) 200 mg tablet Take 1 tablet (200 mg total) by mouth once daily.    irbesartan (AVAPRO) 75 MG tablet Take 1 tablet by mouth once daily.    levothyroxine (SYNTHROID) 75 MCG tablet Take 75 mcg by mouth once daily.    methylPREDNISolone (MEDROL DOSEPACK) 4 mg tablet Take 4 mg by mouth.    NURTEC 75 mg odt Take 75 mg by mouth daily as needed.    olopatadine (PATADAY) 0.2 % Drop INSTILL 1 DROP INTO EACH EYE ONCE DAILY    oxyCODONE-acetaminophen (PERCOCET) 5-325 mg per tablet Take 1 tablet by mouth every 8 (eight) hours as needed.    potassium chloride SA (K-DUR,KLOR-CON) 20 MEQ tablet Take 20 mEq by mouth as needed.     pravastatin (PRAVACHOL) 20 MG tablet Take 20 mg by mouth once daily.    testosterone cypionate (DEPOTESTOTERONE CYPIONATE) 200 mg/mL injection Inject 200 mg into the muscle every 14 (fourteen) days. 05mL every 2 weeks    [START ON 5/19/2025] lisdexamfetamine (VYVANSE) 60 MG capsule Take 1 capsule (60 mg total) by mouth every morning.   Last reviewed on 5/7/2025  9:03 AM by Luke Ramos FNP-C    Review of patient's allergies indicates:   Allergen Reactions    Atorvastatin Other (See Comments)     myalgia    Doxycycline      Makes my throat swell    Gabapentin Other (See Comments)     Weakness, fatigue    Lyrica [pregabalin]      Memory loss    Micardis [telmisartan] Other (See Comments)     cough    Promethazine Other (See Comments)     Makes my skin crawl      Propofol analogues Other (See Comments)     BRAIN FOG,  FEELING VERY WEIRD    Tetracyclines Other (See Comments) and Swelling      Past Medical History:   Diagnosis Date    ADD (attention deficit disorder)     Anxiety     Asthma     intermittent    Cardiomyopathy     Idiopathic    CHF (congestive heart failure)     Hyperlipidemia     Hypertension     Low testosterone     Rheumatoid arthritis, unspecified     Sleep apnea      Past Surgical History:   Procedure Laterality  "Date    CARDIAC CATHETERIZATION      left arm surgery      FB removal          OBJECTIVE:      Vitals:    05/07/25 0848   BP: 126/88   BP Location: Left arm   Patient Position: Sitting   Pulse: 81   Temp: 98.1 °F (36.7 °C)   TempSrc: Oral   SpO2: 96%   Weight: 108.4 kg (238 lb 15.7 oz)   Height: 5' 10" (1.778 m)     Physical Exam  Vitals and nursing note reviewed.   Constitutional:       General: He is awake. He is not in acute distress.     Appearance: He is well-developed and well-groomed. He is obese. He is not ill-appearing, toxic-appearing or diaphoretic.   HENT:      Head: Normocephalic and atraumatic.      Nose: Nose normal.   Eyes:      General: Lids are normal. Gaze aligned appropriately.      Conjunctiva/sclera: Conjunctivae normal.      Right eye: Right conjunctiva is not injected.      Left eye: Left conjunctiva is not injected.      Pupils: Pupils are equal, round, and reactive to light.   Cardiovascular:      Rate and Rhythm: Normal rate and regular rhythm.      Pulses: Normal pulses.      Heart sounds: Normal heart sounds, S1 normal and S2 normal. No murmur heard.     No friction rub. No gallop.   Pulmonary:      Effort: Pulmonary effort is normal. No respiratory distress.      Breath sounds: Normal breath sounds. No stridor. No decreased breath sounds, wheezing, rhonchi or rales.   Chest:      Chest wall: No tenderness.   Musculoskeletal:      Right shoulder: Tenderness and bony tenderness present. Decreased range of motion. Decreased strength.      Cervical back: Neck supple.      Right lower leg: No edema.      Left lower leg: No edema.   Lymphadenopathy:      Cervical: No cervical adenopathy.   Skin:     General: Skin is warm and dry.      Capillary Refill: Capillary refill takes less than 2 seconds.      Findings: No erythema or rash.   Neurological:      Mental Status: He is alert and oriented to person, place, and time. Mental status is at baseline.   Psychiatric:         Attention and " Perception: Attention normal.         Mood and Affect: Mood normal.         Speech: Speech normal.         Behavior: Behavior normal. Behavior is cooperative.         Thought Content: Thought content normal.         Judgment: Judgment normal.       Lab Visit on 12/02/2024   Component Date Value Ref Range Status    Microalbumin, Urine 12/02/2024 18.6  <19.9 ug/mL Final    Creatinine, Urine 12/02/2024 406.7 (H)  23.0 - 375.0 mg/dL Final    Comment: The random urine reference ranges provided were established   for 24 hour urine collections.  No reference ranges exist for  random urine specimens.  Correlate clinically.      Microalb/Creat Ratio 12/02/2024 4.6  0.0 - 30.0 ug/mg Final   Lab Visit on 12/02/2024   Component Date Value Ref Range Status    WBC 12/02/2024 8.24  3.90 - 12.70 K/uL Final    RBC 12/02/2024 5.44  4.60 - 6.20 M/uL Final    Hemoglobin 12/02/2024 16.4  14.0 - 18.0 g/dL Final    Hematocrit 12/02/2024 50.4  40.0 - 54.0 % Final    MCV 12/02/2024 93  82 - 98 fL Final    MCH 12/02/2024 30.1  27.0 - 31.0 pg Final    MCHC 12/02/2024 32.5  32.0 - 36.0 g/dL Final    RDW 12/02/2024 13.2  11.5 - 14.5 % Final    Platelets 12/02/2024 296  150 - 450 K/uL Final    MPV 12/02/2024 8.8 (L)  9.2 - 12.9 fL Final    Immature Granulocytes 12/02/2024 0.2  0.0 - 0.5 % Final    Gran # (ANC) 12/02/2024 5.8  1.8 - 7.7 K/uL Final    Immature Grans (Abs) 12/02/2024 0.02  0.00 - 0.04 K/uL Final    Comment: Mild elevation in immature granulocytes is non specific and   can be seen in a variety of conditions including stress response,   acute inflammation, trauma and pregnancy. Correlation with other   laboratory and clinical findings is essential.      Lymph # 12/02/2024 1.4  1.0 - 4.8 K/uL Final    Mono # 12/02/2024 0.8  0.3 - 1.0 K/uL Final    Eos # 12/02/2024 0.2  0.0 - 0.5 K/uL Final    Baso # 12/02/2024 0.07  0.00 - 0.20 K/uL Final    nRBC 12/02/2024 0  0 /100 WBC Final    Gran % 12/02/2024 70.1  38.0 - 73.0 % Final    Lymph %  12/02/2024 16.5 (L)  18.0 - 48.0 % Final    Mono % 12/02/2024 9.7  4.0 - 15.0 % Final    Eosinophil % 12/02/2024 2.7  0.0 - 8.0 % Final    Basophil % 12/02/2024 0.8  0.0 - 1.9 % Final    Differential Method 12/02/2024 Automated   Final    Sodium 12/02/2024 138  136 - 145 mmol/L Final    Potassium 12/02/2024 4.3  3.5 - 5.1 mmol/L Final    Chloride 12/02/2024 102  95 - 110 mmol/L Final    CO2 12/02/2024 29  23 - 29 mmol/L Final    Glucose 12/02/2024 66 (L)  70 - 110 mg/dL Final    BUN 12/02/2024 15  6 - 20 mg/dL Final    Creatinine 12/02/2024 1.5 (H)  0.5 - 1.4 mg/dL Final    Calcium 12/02/2024 9.4  8.7 - 10.5 mg/dL Final    Total Protein 12/02/2024 7.6  6.0 - 8.4 g/dL Final    Albumin 12/02/2024 4.4  3.5 - 5.2 g/dL Final    Total Bilirubin 12/02/2024 0.4  0.1 - 1.0 mg/dL Final    Comment: For infants and newborns, interpretation of results should be based  on gestational age, weight and in agreement with clinical  observations.    Premature Infant recommended reference ranges:  Up to 24 hours.............<8.0 mg/dL  Up to 48 hours............<12.0 mg/dL  3-5 days..................<15.0 mg/dL  6-29 days.................<15.0 mg/dL      Alkaline Phosphatase 12/02/2024 82  55 - 135 U/L Final    AST 12/02/2024 19  10 - 40 U/L Final    ALT 12/02/2024 15  10 - 44 U/L Final    eGFR 12/02/2024 55.7 (A)  >60 mL/min/1.73 m^2 Final    Anion Gap 12/02/2024 7 (L)  8 - 16 mmol/L Final    Cholesterol 12/02/2024 195  120 - 199 mg/dL Final    Comment: The National Cholesterol Education Program (NCEP) has set the  following guidelines (reference ranges) for Cholesterol:  Optimal.....................<200 mg/dL  Borderline High.............200-239 mg/dL  High........................> or = 240 mg/dL      Triglycerides 12/02/2024 215 (H)  30 - 150 mg/dL Final    Comment: The National Cholesterol Education Program (NCEP) has set the  following guidelines (reference values) for triglycerides:  Normal......................<150  mg/dL  Borderline High.............150-199 mg/dL  High........................200-499 mg/dL      HDL 12/02/2024 44  40 - 75 mg/dL Final    Comment: The National Cholesterol Education Program (NCEP) has set the  following guidelines (reference values) for HDL Cholesterol:  Low...............<40 mg/dL  Optimal...........>60 mg/dL      LDL Cholesterol 12/02/2024 108.0  63.0 - 159.0 mg/dL Final    Comment: The National Cholesterol Education Program (NCEP) has set the  following guidelines (reference values) for LDL Cholesterol:  Optimal.......................<130 mg/dL  Borderline High...............130-159 mg/dL  High..........................160-189 mg/dL  Very High.....................>190 mg/dL      HDL/Cholesterol Ratio 12/02/2024 22.6  20.0 - 50.0 % Final    Total Cholesterol/HDL Ratio 12/02/2024 4.4  2.0 - 5.0 Final    Non-HDL Cholesterol 12/02/2024 151  mg/dL Final    Comment: Risk category and Non-HDL cholesterol goals:  Coronary heart disease (CHD)or equivalent (10-year risk of CHD >20%):  Non-HDL cholesterol goal     <130 mg/dL  Two or more CHD risk factors and 10-year risk of CHD <= 20%:  Non-HDL cholesterol goal     <160 mg/dL  0 to 1 CHD risk factor:  Non-HDL cholesterol goal     <190 mg/dL      TSH 12/02/2024 1.266  0.340 - 5.600 uIU/mL Final    Hemoglobin A1C 12/02/2024 5.3  4.5 - 6.2 % Final    Comment: According to ADA guidelines, hemoglobin A1C <7.0% represents  optimal control in non-pregnant diabetic patients.  Different  metrics may apply to specific populations.   Standards of Medical Care in Diabetes - 2016.    For the purpose of screening for the presence of diabetes:  <5.7%     Consistent with the absence of diabetes  5.7-6.4%  Consistent with increasing risk for diabetes   (prediabetes)  >or=6.5%  Consistent with diabetes    Currently no consensus exists for use of hemoglobin A1C  for diagnosis of diabetes for children.      Estimated Avg Glucose 12/02/2024 105  68 - 131 mg/dL Final    Free T4  12/02/2024 0.59 (L)  0.71 - 1.51 ng/dL Final   Office Visit on 11/11/2024   Component Date Value Ref Range Status    POC Rapid COVID 11/11/2024 Negative  Negative Final     Acceptable 11/11/2024 Yes   Final    POC Molecular Influenza A Ag 11/11/2024 Negative  Negative Final    POC Molecular Influenza B Ag 11/11/2024 Negative  Negative Final     Acceptable 11/11/2024 Yes   Final    Molecular Strep A, POC 11/11/2024 Negative  Negative Final     Acceptable 11/11/2024 Yes   Final          Assessment:       1. Attention deficit hyperactivity disorder (ADHD), unspecified ADHD type    2. Primary hypertension    3. Hypothyroidism, unspecified type    4. Stage 3a chronic kidney disease    5. Dilated cardiomyopathy    6. Chronic systolic congestive heart failure    7. SONYA (obstructive sleep apnea)    8. Rheumatoid arthritis, involving unspecified site, unspecified whether rheumatoid factor present    9. Screening for prostate cancer    10. Encounter for long-term (current) use of medications        Plan:       Assessment & Plan    PLAN SUMMARY:  - Orthopedist appointment today  - Continue Vyvanse 60 mg daily for ADHD  - Thyroid function labs ordered for next month  - Ordered CBC, CMP, PSA, lipid panel, and other routine tests for next month  - Continued levothyroxine 75 mcg  - Prescription renewed with refills  - Continued Plaquenil for management  - Discussed new pneumococcal vaccine option (PCV20/PCV21)  - Cardiology follow-up scheduled in 1 month with Dr. Taylor  - Follow-up scheduled in 3 months for medication management    RHEUMATOID ARTHRITIS, INVOLVING UNSPECIFIED SITE, UNSPECIFIED WHETHER RHEUMATOID FACTOR PRESENT:  - Continued Plaquenil 200 mg for management.  - Mr. Parr continues to follow with rheumatologist Dr. Milligan.    CHRONIC SYSTOLIC CONGESTIVE HEART FAILURE:  - Cardiology follow-up scheduled next month with Dr. Taylor, who has authorized surgery.  -  Continue current medications.  - Managed by Cardiology.     STAGE 3A CHRONIC KIDNEY DISEASE:  - Ordered CBC, CMP, PSA, lipid panel, and other routine tests for next month to monitor kidney function.  - Stay hydrated.  - Avoid NSAIDs.   - Strict blood pressure control.     ROTATOR CUFF TEAR:  - Mr. Parr reports progression from intermittent to constant shoulder pain over the past 4 months due to torn rotator cuff.  - Discussed pain management options including synthetic cartilage injection, noting concerns about potential pressure and further tearing.  - Mr. Parr is seeing orthopedist today with surgery scheduled.    HYPERTENSION:  - Blood pressure is controlled at 126/88.  - Continued Coreg 25 mg bid and Irbesartan 75 mg daily.   - Reduce the amount of salt in your diet; Lose weight; Avoid drinking too much alcohol; Exercise at least 30 minutes per day most days of the week.    - Continue home BP monitoring.    HYPERLIPIDEMIA:  - Continued Pravastatin 20 mg and Zetia 10 mg.  - Limit red meat, butter, fried foods, cheese, and other foods that have a lot of saturated fat.   - Consume more: lean meats, fish, fruits, vegetables, whole grains, beans, lentils, and nuts.    - Recommend weight loss, and 30-45 min of cardiovascular exercise daily.    ATTENTION-DEFICIT HYPERACTIVITY DISORDER (ADHD):  - Vyvanse 60 mg daily continues to be effective for ADHD, enabling focus and concentration without cardiovascular side effects (no chest pain or palpitations reported).  - Prescription renewed with refills.  -  reviewed.  - Follow-up scheduled in 3 months for medication management.    HYPOTHYROIDISM:  - Prescribed levothyroxine 75 mcg.  - Thyroid function labs ordered for next month.  - Managed by Endocrinology.     OBSTRUCTIVE SLEEP APNEA:  - Mr. Parr is compliant with sleep apparatus usage at night.  - Continue to see pulmonologist for ongoing management.    OTHER:  - Discussed sleep disturbances related to  pain and current medication regimen.  - Discussed new pneumococcal vaccine option (PCV20/PCV21).  - Will consider administering at next appointment in 3 months.        Attention deficit hyperactivity disorder (ADHD), unspecified ADHD type  -     TSH; Future; Expected date: 05/07/2025  -     lisdexamfetamine (VYVANSE) 60 MG capsule; Take 1 capsule (60 mg total) by mouth every morning.  Dispense: 30 capsule; Refill: 0    Primary hypertension  -     CBC Auto Differential; Future; Expected date: 05/07/2025  -     Comprehensive Metabolic Panel; Future; Expected date: 05/07/2025  -     Lipid Panel; Future; Expected date: 05/07/2025  -     TSH; Future; Expected date: 05/07/2025    Hypothyroidism, unspecified type  -     Comprehensive Metabolic Panel; Future; Expected date: 05/07/2025  -     Lipid Panel; Future; Expected date: 05/07/2025  -     TSH; Future; Expected date: 05/07/2025    Stage 3a chronic kidney disease  -     CBC Auto Differential; Future; Expected date: 05/07/2025  -     Comprehensive Metabolic Panel; Future; Expected date: 05/07/2025    Dilated cardiomyopathy  -     CBC Auto Differential; Future; Expected date: 05/07/2025  -     Comprehensive Metabolic Panel; Future; Expected date: 05/07/2025  -     Lipid Panel; Future; Expected date: 05/07/2025  -     TSH; Future; Expected date: 05/07/2025    Chronic systolic congestive heart failure    SONYA (obstructive sleep apnea)    Rheumatoid arthritis, involving unspecified site, unspecified whether rheumatoid factor present    Screening for prostate cancer  -     PSA, Screening; Future; Expected date: 05/07/2025    Encounter for long-term (current) use of medications  -     CBC Auto Differential; Future; Expected date: 05/07/2025  -     Comprehensive Metabolic Panel; Future; Expected date: 05/07/2025  -     Lipid Panel; Future; Expected date: 05/07/2025  -     TSH; Future; Expected date: 05/07/2025    I spent a total of 25 minutes on the day of the visit.This  includes face to face time and non-face to face time preparing to see the patient (eg, review of tests), obtaining and/or reviewing separately obtained history, documenting clinical information in the electronic or other health record, independently interpreting results and communicating results to the patient/family/caregiver, or care coordinator.    Follow up in about 3 months (around 8/7/2025) for ADHD.          5/7/2025 BETY Biggs, PRAVIN    This note was generated with the assistance of ambient listening technology. Verbal consent was obtained by the patient and accompanying visitor(s) for the recording of patient appointment to facilitate this note. I attest to having reviewed and edited the generated note for accuracy, though some syntax or spelling errors may persist. Please contact the author of this note for any clarification.

## 2025-06-19 ENCOUNTER — TELEPHONE (OUTPATIENT)
Dept: FAMILY MEDICINE | Facility: CLINIC | Age: 55
End: 2025-06-19
Payer: MEDICARE

## 2025-06-19 NOTE — TELEPHONE ENCOUNTER
Please send in Rx to pharmacy. Pt first msg for a refill request was sent on 6/17. Please advise.

## 2025-06-19 NOTE — TELEPHONE ENCOUNTER
----- Message from Bindu sent at 6/19/2025 12:51 PM CDT -----  - 12:34- pt is calling about the request for vyvanse he sent online. It has been 3 days and still not at the pharmacy. Please send today as he is out   [phone]

## 2025-06-20 DIAGNOSIS — F90.9 ATTENTION DEFICIT HYPERACTIVITY DISORDER (ADHD), UNSPECIFIED ADHD TYPE: ICD-10-CM

## 2025-06-20 RX ORDER — LISDEXAMFETAMINE DIMESYLATE 60 MG/1
60 CAPSULE ORAL EVERY MORNING
Qty: 30 CAPSULE | Refills: 0 | Status: SHIPPED | OUTPATIENT
Start: 2025-06-20

## 2025-07-18 DIAGNOSIS — F90.9 ATTENTION DEFICIT HYPERACTIVITY DISORDER (ADHD), UNSPECIFIED ADHD TYPE: ICD-10-CM

## 2025-07-21 RX ORDER — LISDEXAMFETAMINE DIMESYLATE 60 MG/1
60 CAPSULE ORAL EVERY MORNING
Qty: 30 CAPSULE | Refills: 0 | Status: SHIPPED | OUTPATIENT
Start: 2025-07-21

## 2025-08-07 ENCOUNTER — OFFICE VISIT (OUTPATIENT)
Dept: FAMILY MEDICINE | Facility: CLINIC | Age: 55
End: 2025-08-07
Payer: MEDICARE

## 2025-08-07 VITALS
SYSTOLIC BLOOD PRESSURE: 126 MMHG | WEIGHT: 231.5 LBS | BODY MASS INDEX: 33.21 KG/M2 | DIASTOLIC BLOOD PRESSURE: 80 MMHG | HEART RATE: 106 BPM | OXYGEN SATURATION: 98 % | TEMPERATURE: 98 F

## 2025-08-07 DIAGNOSIS — I50.22 CHRONIC SYSTOLIC CONGESTIVE HEART FAILURE: ICD-10-CM

## 2025-08-07 DIAGNOSIS — E03.9 HYPOTHYROIDISM, UNSPECIFIED TYPE: ICD-10-CM

## 2025-08-07 DIAGNOSIS — R53.83 OTHER FATIGUE: ICD-10-CM

## 2025-08-07 DIAGNOSIS — F90.9 ATTENTION DEFICIT HYPERACTIVITY DISORDER (ADHD), UNSPECIFIED ADHD TYPE: Primary | ICD-10-CM

## 2025-08-07 PROCEDURE — 99215 OFFICE O/P EST HI 40 MIN: CPT | Mod: PBBFAC,PN | Performed by: NURSE PRACTITIONER

## 2025-08-07 PROCEDURE — 99213 OFFICE O/P EST LOW 20 MIN: CPT | Mod: S$PBB,,, | Performed by: NURSE PRACTITIONER

## 2025-08-07 PROCEDURE — G2211 COMPLEX E/M VISIT ADD ON: HCPCS | Mod: 95,,, | Performed by: NURSE PRACTITIONER

## 2025-08-07 PROCEDURE — 99999 PR PBB SHADOW E&M-EST. PATIENT-LVL V: CPT | Mod: PBBFAC,,, | Performed by: NURSE PRACTITIONER

## 2025-08-07 RX ORDER — LISDEXAMFETAMINE DIMESYLATE 60 MG/1
60 CAPSULE ORAL EVERY MORNING
Qty: 30 CAPSULE | Refills: 0 | Status: SHIPPED | OUTPATIENT
Start: 2025-08-20

## 2025-08-07 NOTE — PROGRESS NOTES
SUBJECTIVE:      Patient ID: Bharath Parr is a 54 y.o. male.    Chief Complaint: Follow-up (3 month follow up )    History of Present Illness    CHIEF COMPLAINT:  Mr. Parr presents for a follow-up visit to discuss changes in his cardiac health, including a decrease in EF and adjustments to heart medications.    HPI:  Mr. Parr reports his EF has decreased from 43% to 37% over the past two years, with a decline from 41% to 37% in the last year. As a result, his heart medications have been significantly increased to lower blood pressure. He is currently taking 225 mg of Irbesartan daily (1.5 150 mg pills). Taking a 300 mg dose causes his blood pressure to drop to around 80/63, which is too low and can lead to syncope upon standing. He is also taking Coreg at the highest dose of 25 mg.    Mr. Parr reports significant weakness, which he attributes to the higher dosage of heart medication. He describes physical weakness, difficulty lifting objects, and fatigue. This weakness prompted labs with his endocrinologist 2 days ago, including cortisol tests, to investigate potential causes.     Mr. Parr is evaluated by an endocrinologist, in [place], every 6 months since 2012 for thyroid issues and testosterone supplements. He was recently evaluated by Dr. Rincon for an eye exam, which revealed the development of 20% cataracts that were not present a year ago. He attributes this to potentially receiving excessive short steroid injections from pain management.    Mr. Parr expresses dissatisfaction with his current pain management doctor, citing loss of trust due to multiple incidents. He describes a procedure involving cartilage injection into his right arm for a torn rotator cuff, which exacerbated his pain from occurring twice weekly to more frequently. He also mentions an incident where he felt a tear-like sensation in his quad near the knee, causing him to limp, but the doctor  focused on discussing an unrelated bone spur surgery.    Mr. Parr denies chest pain or palpitations with his current medication regimen.    MEDICATIONS:  Mr. Parr is on Irbesartan 225 mg daily for blood pressure control, which was recently increased from 150 mg. He is also taking Coreg 25 mg, the highest dose, for his heart condition. He is on Vyvanse 60 mg.    MEDICAL HISTORY:  Mr. Parr has a history of decreased EF, which has declined from 43 to 37 over two years, with a drop from 41 to 37 in the last year. He has hypertension, which is managed with medication. Mr. Parr was diagnosed with thyroid dysfunction around 2012. He has developed 20% cataracts within the past year. Mr. Parr also has a torn rotator cuff in his right arm.    TEST RESULTS:  Mr. Parr underwent cortisol testing and labs two days ago, with results pending. He had a thyroid test seven years ago, which indicated thyroid dysfunction. Over the past year, the patient's echocardiogram showed a decrease in EF from 41 to 37. Two years ago, his EF was 43.         Review of Systems   Constitutional:  Positive for fatigue. Negative for activity change, appetite change, chills, diaphoresis, fever and unexpected weight change.   HENT:  Negative for congestion, ear pain, sinus pressure, sore throat, trouble swallowing and voice change.    Eyes:  Negative for pain, discharge and visual disturbance.   Respiratory:  Negative for cough, chest tightness, shortness of breath and wheezing.         SONYA   Cardiovascular:  Negative for chest pain and palpitations.        CHF, dilated cardiomyopathy, HTN and HLD   Gastrointestinal:  Negative for abdominal pain, constipation, diarrhea, nausea and vomiting.   Endocrine:        Hypothyroidism    Genitourinary:  Negative for difficulty urinating, flank pain, frequency and urgency.        Low testosterone    Musculoskeletal:  Positive for arthralgias (right shoulder and right knee).  "Negative for back pain and joint swelling.        Chronic pain   Skin:  Negative for color change and rash.   Allergic/Immunologic:        Rheumatoid arthritis   Neurological:  Negative for dizziness, seizures, syncope, weakness, numbness and headaches.   Hematological:  Negative for adenopathy.   Psychiatric/Behavioral:  Negative for dysphoric mood and sleep disturbance. The patient is not nervous/anxious.         ADHD on Vyvanse       Family History   Problem Relation Name Age of Onset    Cancer Mother          cervical    Heart disease Mother      Hypertension Mother      Stroke Father      Cancer Maternal Grandmother          bladder    Macular degeneration Paternal Grandmother      Cancer Paternal Grandfather          skin    Alzheimer's disease Paternal Grandfather        Social History     Socioeconomic History    Marital status: Single   Tobacco Use    Smoking status: Never     Passive exposure: Never    Smokeless tobacco: Never   Substance and Sexual Activity    Alcohol use: Yes     Comment: socially    Drug use: No    Sexual activity: Not Currently     Social Drivers of Health     Physical Activity: Insufficiently Active (2/10/2022)    Exercise Vital Sign     Days of Exercise per Week: 3 days     Minutes of Exercise per Session: 20 min   Stress: Stress Concern Present (2/10/2022)    [employer] - Occupational Stress Questionnaire     Feeling of Stress : To some extent     Current Outpatient Medications   Medication Sig    acetaminophen (TYLENOL) 500 MG tablet Take 600 mg by mouth every 6 (six) hours as needed for Pain.    amitriptyline (ELAVIL) 25 MG tablet Take 25 mg by mouth.    azelastine-fluticasone (DYMISTA) 137-50 mcg/spray Spry nassal spray SPRAY 1 SPRAY(S) IN EACH NOSTRIL TWICE DAILY    BD LUER-KRISTIE SYRINGE 3 mL 21 gauge x 1 1/2" Syrg SMARTSIG:Injection Every 2 Weeks    BD REGULAR BEVEL NEEDLES 18 gauge x 1 1/2" Ndle USE NEEDLES TO DRAW UP TESTOSTERONE EVERY 2 WEEKS    " carvediloL (COREG) 25 MG tablet Take 25 mg by mouth 2 (two) times daily.    cetirizine (ZYRTEC) 10 MG tablet Take 10 mg by mouth 2 (two) times daily.    clobetasoL (TEMOVATE) 0.05 % cream Apply topically 2 (two) times daily.    cyclobenzaprine (FLEXERIL) 10 MG tablet Take 10 mg by mouth.    diflunisaL (DOLOBID) 500 mg Tab Take 1 tablet (500 mg total) by mouth 2 (two) times daily as needed.    EPINEPHrine (EPIPEN) 0.3 mg/0.3 mL AtIn INJECT CONTENTS OF 1 PEN AS NEEDED FOR ALLERGIC REACTION (INJECT  AT  FIRST  SIGN  OF SEVERE  ALLERGIC  REACTION  OR  ANAPHYLAXIS)    ergocalciferol (ERGOCALCIFEROL) 50,000 unit Cap Take 50,000 Units by mouth every 7 days.    ezetimibe (ZETIA) 10 mg tablet Take 10 mg by mouth once daily.    famotidine (PEPCID) 20 MG tablet Take 20 mg by mouth 2 (two) times daily.    hydroxychloroquine (PLAQUENIL) 200 mg tablet Take 1 tablet (200 mg total) by mouth once daily.    irbesartan (AVAPRO) 75 MG tablet Take 150 mg by mouth once daily. 1.5 of 150  for 325 mg    levothyroxine (SYNTHROID) 75 MCG tablet Take 75 mcg by mouth once daily.    methylPREDNISolone (MEDROL DOSEPACK) 4 mg tablet Take 4 mg by mouth.    NURTEC 75 mg odt Take 75 mg by mouth daily as needed.    olopatadine (PATADAY) 0.2 % Drop INSTILL 1 DROP INTO EACH EYE ONCE DAILY    oxyCODONE-acetaminophen (PERCOCET) 5-325 mg per tablet Take 1 tablet by mouth every 8 (eight) hours as needed.    potassium chloride SA (K-DUR,KLOR-CON) 20 MEQ tablet Take 20 mEq by mouth as needed.     pravastatin (PRAVACHOL) 20 MG tablet Take 20 mg by mouth once daily.    testosterone cypionate (DEPOTESTOTERONE CYPIONATE) 200 mg/mL injection Inject 200 mg into the muscle every 14 (fourteen) days. 05mL every 2 weeks    [START ON 8/20/2025] lisdexamfetamine (VYVANSE) 60 MG capsule Take 1 capsule (60 mg total) by mouth every morning.   Last reviewed on 8/7/2025 10:41 AM by Luke Ramos FNP-C    Review of patient's allergies indicates:   Allergen  Reactions    Atorvastatin Other (See Comments)     myalgia    Doxycycline      Makes my throat swell    Gabapentin Other (See Comments)     Weakness, fatigue    Lyrica [pregabalin]      Memory loss    Micardis [telmisartan] Other (See Comments)     cough    Promethazine Other (See Comments)     Makes my skin crawl      Propofol analogues Other (See Comments)     BRAIN FOG,  FEELING VERY WEIRD    Tetracyclines Other (See Comments) and Swelling      Past Medical History:   Diagnosis Date    ADD (attention deficit disorder)     Anxiety     Asthma     intermittent    Cardiomyopathy     Idiopathic    CHF (congestive heart failure)     Hyperlipidemia     Hypertension     Low testosterone     Rheumatoid arthritis, unspecified     Sleep apnea      Past Surgical History:   Procedure Laterality Date    CARDIAC CATHETERIZATION      left arm surgery      FB removal          OBJECTIVE:      Vitals:    08/07/25 1016   BP: 126/80   Pulse: 106   Temp: 98.4 °F (36.9 °C)   TempSrc: Oral   SpO2: 98%   Weight: 105 kg (231 lb 7.7 oz)     Physical Exam  Vitals and nursing note reviewed.   Constitutional:       General: He is awake. He is not in acute distress.     Appearance: He is well-developed and well-groomed. He is obese. He is not ill-appearing, toxic-appearing or diaphoretic.   HENT:      Head: Normocephalic and atraumatic.      Nose: Nose normal.   Eyes:      General: Lids are normal. Gaze aligned appropriately.      Conjunctiva/sclera: Conjunctivae normal.      Right eye: Right conjunctiva is not injected.      Left eye: Left conjunctiva is not injected.      Pupils: Pupils are equal, round, and reactive to light.   Cardiovascular:      Rate and Rhythm: Normal rate and regular rhythm.      Pulses: Normal pulses.      Heart sounds: Normal heart sounds, S1 normal and S2 normal. No murmur heard.     No friction rub. No gallop.   Pulmonary:      Effort: Pulmonary effort is normal. No respiratory distress.      Breath sounds: Normal  breath sounds. No stridor. No decreased breath sounds, wheezing, rhonchi or rales.   Chest:      Chest wall: No tenderness.   Musculoskeletal:      Cervical back: Neck supple.      Right lower leg: No edema.      Left lower leg: No edema.   Lymphadenopathy:      Cervical: No cervical adenopathy.   Skin:     General: Skin is warm and dry.      Capillary Refill: Capillary refill takes less than 2 seconds.      Findings: No erythema or rash.   Neurological:      Mental Status: He is alert and oriented to person, place, and time. Mental status is at baseline.   Psychiatric:         Attention and Perception: Attention normal.         Mood and Affect: Mood normal.         Speech: Speech normal.         Behavior: Behavior normal. Behavior is cooperative.         Thought Content: Thought content normal.         Judgment: Judgment normal.       Lab Visit on 12/02/2024   Component Date Value Ref Range Status    Microalbumin, Urine 12/02/2024 18.6  <19.9 ug/mL Final    Creatinine, Urine 12/02/2024 406.7 (H)  23.0 - 375.0 mg/dL Final    Comment: The random urine reference ranges provided were established   for 24 hour urine collections.  No reference ranges exist for  random urine specimens.  Correlate clinically.      Microalb/Creat Ratio 12/02/2024 4.6  0.0 - 30.0 ug/mg Final   Lab Visit on 12/02/2024   Component Date Value Ref Range Status    WBC 12/02/2024 8.24  3.90 - 12.70 K/uL Final    RBC 12/02/2024 5.44  4.60 - 6.20 M/uL Final    Hemoglobin 12/02/2024 16.4  14.0 - 18.0 g/dL Final    Hematocrit 12/02/2024 50.4  40.0 - 54.0 % Final    MCV 12/02/2024 93  82 - 98 fL Final    MCH 12/02/2024 30.1  27.0 - 31.0 pg Final    MCHC 12/02/2024 32.5  32.0 - 36.0 g/dL Final    RDW 12/02/2024 13.2  11.5 - 14.5 % Final    Platelets 12/02/2024 296  150 - 450 K/uL Final    MPV 12/02/2024 8.8 (L)  9.2 - 12.9 fL Final    Immature Granulocytes 12/02/2024 0.2  0.0 - 0.5 % Final    Gran # (ANC) 12/02/2024 5.8  1.8 - 7.7 K/uL Final    Immature  Grans (Abs) 12/02/2024 0.02  0.00 - 0.04 K/uL Final    Comment: Mild elevation in immature granulocytes is non specific and   can be seen in a variety of conditions including stress response,   acute inflammation, trauma and pregnancy. Correlation with other   laboratory and clinical findings is essential.      Lymph # 12/02/2024 1.4  1.0 - 4.8 K/uL Final    Mono # 12/02/2024 0.8  0.3 - 1.0 K/uL Final    Eos # 12/02/2024 0.2  0.0 - 0.5 K/uL Final    Baso # 12/02/2024 0.07  0.00 - 0.20 K/uL Final    nRBC 12/02/2024 0  0 /100 WBC Final    Gran % 12/02/2024 70.1  38.0 - 73.0 % Final    Lymph % 12/02/2024 16.5 (L)  18.0 - 48.0 % Final    Mono % 12/02/2024 9.7  4.0 - 15.0 % Final    Eosinophil % 12/02/2024 2.7  0.0 - 8.0 % Final    Basophil % 12/02/2024 0.8  0.0 - 1.9 % Final    Differential Method 12/02/2024 Automated   Final    Sodium 12/02/2024 138  136 - 145 mmol/L Final    Potassium 12/02/2024 4.3  3.5 - 5.1 mmol/L Final    Chloride 12/02/2024 102  95 - 110 mmol/L Final    CO2 12/02/2024 29  23 - 29 mmol/L Final    Glucose 12/02/2024 66 (L)  70 - 110 mg/dL Final    BUN 12/02/2024 15  6 - 20 mg/dL Final    Creatinine 12/02/2024 1.5 (H)  0.5 - 1.4 mg/dL Final    Calcium 12/02/2024 9.4  8.7 - 10.5 mg/dL Final    Total Protein 12/02/2024 7.6  6.0 - 8.4 g/dL Final    Albumin 12/02/2024 4.4  3.5 - 5.2 g/dL Final    Total Bilirubin 12/02/2024 0.4  0.1 - 1.0 mg/dL Final    Comment: For infants and newborns, interpretation of results should be based  on gestational age, weight and in agreement with clinical  observations.    Premature Infant recommended reference ranges:  Up to 24 hours.............<8.0 mg/dL  Up to 48 hours............<12.0 mg/dL  3-5 days..................<15.0 mg/dL  6-29 days.................<15.0 mg/dL      Alkaline Phosphatase 12/02/2024 82  55 - 135 U/L Final    AST 12/02/2024 19  10 - 40 U/L Final    ALT 12/02/2024 15  10 - 44 U/L Final    eGFR 12/02/2024 55.7 (A)  >60 mL/min/1.73 m^2 Final    Anion  Gap 12/02/2024 7 (L)  8 - 16 mmol/L Final    Cholesterol 12/02/2024 195  120 - 199 mg/dL Final    Comment: The National Cholesterol Education Program (NCEP) has set the  following guidelines (reference ranges) for Cholesterol:  Optimal.....................<200 mg/dL  Borderline High.............200-239 mg/dL  High........................> or = 240 mg/dL      Triglycerides 12/02/2024 215 (H)  30 - 150 mg/dL Final    Comment: The National Cholesterol Education Program (NCEP) has set the  following guidelines (reference values) for triglycerides:  Normal......................<150 mg/dL  Borderline High.............150-199 mg/dL  High........................200-499 mg/dL      HDL 12/02/2024 44  40 - 75 mg/dL Final    Comment: The National Cholesterol Education Program (NCEP) has set the  following guidelines (reference values) for HDL Cholesterol:  Low...............<40 mg/dL  Optimal...........>60 mg/dL      LDL Cholesterol 12/02/2024 108.0  63.0 - 159.0 mg/dL Final    Comment: The National Cholesterol Education Program (NCEP) has set the  following guidelines (reference values) for LDL Cholesterol:  Optimal.......................<130 mg/dL  Borderline High...............130-159 mg/dL  High..........................160-189 mg/dL  Very High.....................>190 mg/dL      HDL/Cholesterol Ratio 12/02/2024 22.6  20.0 - 50.0 % Final    Total Cholesterol/HDL Ratio 12/02/2024 4.4  2.0 - 5.0 Final    Non-HDL Cholesterol 12/02/2024 151  mg/dL Final    Comment: Risk category and Non-HDL cholesterol goals:  Coronary heart disease (CHD)or equivalent (10-year risk of CHD >20%):  Non-HDL cholesterol goal     <130 mg/dL  Two or more CHD risk factors and 10-year risk of CHD <= 20%:  Non-HDL cholesterol goal     <160 mg/dL  0 to 1 CHD risk factor:  Non-HDL cholesterol goal     <190 mg/dL      TSH 12/02/2024 1.266  0.340 - 5.600 uIU/mL Final    Hemoglobin A1C 12/02/2024 5.3  4.5 - 6.2 % Final    Comment: According to ADA  guidelines, hemoglobin A1C <7.0% represents  optimal control in non-pregnant diabetic patients.  Different  metrics may apply to specific populations.   Standards of Medical Care in Diabetes - 2016.    For the purpose of screening for the presence of diabetes:  <5.7%     Consistent with the absence of diabetes  5.7-6.4%  Consistent with increasing risk for diabetes   (prediabetes)  >or=6.5%  Consistent with diabetes    Currently no consensus exists for use of hemoglobin A1C  for diagnosis of diabetes for children.      Estimated Avg Glucose 12/02/2024 105  68 - 131 mg/dL Final    Free T4 12/02/2024 0.59 (L)  0.71 - 1.51 ng/dL Final   Office Visit on 11/11/2024   Component Date Value Ref Range Status    POC Rapid COVID 11/11/2024 Negative  Negative Final     Acceptable 11/11/2024 Yes   Final    POC Molecular Influenza A Ag 11/11/2024 Negative  Negative Final    POC Molecular Influenza B Ag 11/11/2024 Negative  Negative Final     Acceptable 11/11/2024 Yes   Final    Molecular Strep A, POC 11/11/2024 Negative  Negative Final     Acceptable 11/11/2024 Yes   Final          Assessment:       1. Attention deficit hyperactivity disorder (ADHD), unspecified ADHD type    2. Chronic systolic congestive heart failure    3. Hypothyroidism, unspecified type    4. Other fatigue        Plan:       Assessment & Plan    PLAN SUMMARY:  - Continue Vyvanse 60 mg  - Review files received from cardiologist  - Consider referral to alternative pain management provider  - Follow up in 3 months    ADHD:  - Continue Vyvanse 60 mg.  - Patient states cardiology is aware of his Vyvanse use.  - Vyvanse noted on cardiologist MAR within this notes.  - Unsure if the Vyvanse is playing a role in his reduced EF and cardiomyopathy.  - Continued at this time, but will discontinue Vyvanse if heart conditions worsens.     HEART FAILURE AND HYPERTENSION:  - Reviewed recent cardiology report indicating decline in  EF from 43% to 37% over 2 years, with a decrease from 41% to 37% over the last year.  - Received files from cardiologist for review.  - Cardiology adjusted irbesartan dosage to 150 mg daily (1.5 pills) to address cardiac symptoms and manage side effects, as patient's blood pressure drops to 80/63 when taking 300 mg of irbesartan.  - Continue Coreg 25 mg twice daily.  - Managed by Cardiology.     HYPOTHYROIDISM AND ENDOCRINE ISSUES:  - Recent labs were ordered by his endocrinologist, including cortisol tests, to investigate reported weakness.  - Noted endocrinologist has been managing thyroid issues since 2012.  - Continued testosterone supplements as prescribed by endocrinologist.    WEAKNESS AND FATIGUE:  - Evaluated the patient's reports of significant weakness, fatigue, and difficulty picking things up.  - Noted potential correlation between weakness and increased heart medication dosage.  - Patient's endocrinologist's ordered labs including cortisol testing to investigate underlying causes of weakness.    FOLLOW-UP:  - Continued Vyvanse.  - Follow up in 3 months.        Attention deficit hyperactivity disorder (ADHD), unspecified ADHD type  -     lisdexamfetamine (VYVANSE) 60 MG capsule; Take 1 capsule (60 mg total) by mouth every morning.  Dispense: 30 capsule; Refill: 0    Chronic systolic congestive heart failure    Hypothyroidism, unspecified type    Other fatigue    I spent a total of 21 minutes on the day of the visit.This includes face to face time and non-face to face time preparing to see the patient (eg, review of tests), obtaining and/or reviewing separately obtained history, documenting clinical information in the electronic or other health record, independently interpreting results and communicating results to the patient/family/caregiver, or care coordinator.    Follow up in about 3 months (around 11/7/2025).          8/7/2025 BETY Biggs, FNP    This note was generated with the  assistance of ambient listening technology. Verbal consent was obtained by the patient and accompanying visitor(s) for the recording of patient appointment to facilitate this note. I attest to having reviewed and edited the generated note for accuracy, though some syntax or spelling errors may persist. Please contact the author of this note for any clarification.